# Patient Record
Sex: FEMALE | Race: WHITE | NOT HISPANIC OR LATINO | Employment: OTHER | URBAN - METROPOLITAN AREA
[De-identification: names, ages, dates, MRNs, and addresses within clinical notes are randomized per-mention and may not be internally consistent; named-entity substitution may affect disease eponyms.]

---

## 2017-01-31 ENCOUNTER — OFFICE VISIT (OUTPATIENT)
Dept: PODIATRY | Facility: CLINIC | Age: 77
End: 2017-01-31
Payer: MEDICARE

## 2017-01-31 VITALS
HEIGHT: 66 IN | SYSTOLIC BLOOD PRESSURE: 143 MMHG | HEART RATE: 66 BPM | WEIGHT: 178 LBS | DIASTOLIC BLOOD PRESSURE: 80 MMHG | BODY MASS INDEX: 28.61 KG/M2

## 2017-01-31 DIAGNOSIS — E11.49 TYPE II DIABETES MELLITUS WITH NEUROLOGICAL MANIFESTATIONS: Primary | ICD-10-CM

## 2017-01-31 DIAGNOSIS — L60.0 ONYCHOCRYPTOSIS: ICD-10-CM

## 2017-01-31 DIAGNOSIS — B35.1 ONYCHOMYCOSIS: ICD-10-CM

## 2017-01-31 PROCEDURE — 11721 DEBRIDE NAIL 6 OR MORE: CPT | Mod: S$PBB,Q9,, | Performed by: PODIATRIST

## 2017-01-31 PROCEDURE — 99999 PR PBB SHADOW E&M-EST. PATIENT-LVL II: CPT | Mod: PBBFAC,,, | Performed by: PODIATRIST

## 2017-01-31 PROCEDURE — 99212 OFFICE O/P EST SF 10 MIN: CPT | Mod: PBBFAC,PO,25 | Performed by: PODIATRIST

## 2017-01-31 PROCEDURE — 99212 OFFICE O/P EST SF 10 MIN: CPT | Mod: 25,S$PBB,, | Performed by: PODIATRIST

## 2017-01-31 PROCEDURE — 11721 DEBRIDE NAIL 6 OR MORE: CPT | Mod: PBBFAC,PO | Performed by: PODIATRIST

## 2017-01-31 NOTE — MR AVS SNAPSHOT
[facility]   [place]  [place] [address]  Phone: [phone]                  Ning Rodriguez   2017 8:00 AM   Office Visit    Description:  Female : 1940   Provider:  Fabrizio Parsons DPM   Department:  [facility]           Reason for Visit     Diabetes Mellitus     Follow-up           Diagnoses this Visit        Comments    Type II diabetes mellitus with neurological manifestations    -  Primary     Onychomycosis         Onychocryptosis                To Do List           Future Appointments        Provider Department Dept Phone    2017 8:00 AM [facility] [phone]      Goals (5 Years of Data)     None      Follow-Up and Disposition     Return in about 9 weeks (around 2017).    Follow-up and Disposition History      [facility]     [facility] Care [facility] -  Assistance  Registered nurses in the [facility] provide clinical advisement, health education, appointment booking, and other advisory services.  Call for this free service at [phone].             Medications                Verify that the below list of medications is an accurate representation of the medications you are currently taking.  If none reported, the list may be blank. If incorrect, please contact your healthcare provider. Carry this list with you in case of emergency.           Current Medications     aspirin (ECOTRIN) 81 MG EC tablet Take 81 mg by mouth once daily.    celecoxib (CELEBREX) 100 MG capsule Take 100 mg by mouth as needed for Pain.    cetirizine (ZYRTEC) 10 MG tablet Take 5 mg by mouth once daily.    FLUZONE HIGH-DOSE -, PF, 180 mcg/0.5 mL Syrg TO BE ADMINISTERED BY PHARMACIST FOR IMMUNIZATION    glipiZIDE (GLUCOTROL) 5 MG TR24 Take 5 mg by mouth once daily.           Clinical Reference Information           Vital Signs - Last Recorded  Most recent update: 2017  8:07 AM by Yoshi Kidd MA  "   BP Pulse Ht Wt BMI    (!) 143/80 66 5' 6" (1.676 m) 80.7 kg (178 lb) 28.73 kg/m2      Blood Pressure          Most Recent Value    BP  (!)  143/80      Allergies as of 1/31/2017     No Known Allergies      Immunizations Administered on Date of Encounter - 1/31/2017     None      [facility] Sign-Up     Activating your [facility] account is as easy as 1-2-3!     1) Visit [email or web address], select Sign Up Now, enter this activation code and your date of birth, then select Next.  [email or web address]  Expires: 3/17/2017  8:47 AM      2) Create a username and password to use when you visit [facility] in the future and select a security question in case you lose your password and select Next.    3) Enter your e-mail address and click Sign Up!    Additional Information  If you have questions, please e-mail [email or web address] or call [phone] to talk to our [facility] staff. Remember, [facility] is NOT to be used for urgent needs. For medical emergencies, dial 911.         "

## 2017-01-31 NOTE — PROGRESS NOTES
"Subjective:      Patient ID: Ning Rodriguez is a 76 y.o. female.    Chief Complaint: Diabetes Mellitus and Follow-up (3 month)    Ning is a 76 y.o. female who presents to the clinic for evaluation and treatment of diabetic feet. Ning has a past medical history of Allergy; Arthritis; and Diabetes mellitus, type 2. Patient relates no major problem with feet. High risk Diabetic foot care secondary to history of peripheral neuropathy. No new complaints.    PCP: Pam Soliman MD    Date Last Seen by PCP:     Current shoe gear: Casual shoes    No results found for: HGBA1C    Vitals:    01/31/17 0805   BP: (!) 143/80   Pulse: 66   Weight: 80.7 kg (178 lb)   Height: 5' 6" (1.676 m)   PainSc: 0-No pain      Past Medical History   Diagnosis Date    Allergy     Arthritis     Diabetes mellitus, type 2        Past Surgical History   Procedure Laterality Date    Hemorrhoid surgery         No family history on file.    Social History     Social History    Marital status:      Spouse name: N/A    Number of children: N/A    Years of education: N/A     Social History Main Topics    Smoking status: Former Smoker    Smokeless tobacco: None    Alcohol use No    Drug use: No    Sexual activity: Not Asked     Other Topics Concern    None     Social History Narrative       Current Outpatient Prescriptions   Medication Sig Dispense Refill    aspirin (ECOTRIN) 81 MG EC tablet Take 81 mg by mouth once daily.      celecoxib (CELEBREX) 100 MG capsule Take 100 mg by mouth as needed for Pain.      cetirizine (ZYRTEC) 10 MG tablet Take 5 mg by mouth once daily.      FLUZONE HIGH-DOSE 2016-17, PF, 180 mcg/0.5 mL Syrg TO BE ADMINISTERED BY PHARMACIST FOR IMMUNIZATION  0    glipiZIDE (GLUCOTROL) 5 MG TR24 Take 5 mg by mouth once daily.  5     No current facility-administered medications for this visit.        Review of patient's allergies indicates:  No Known Allergies      Review of Systems   Constitution: " Negative for chills, fever, weakness and malaise/fatigue.   Cardiovascular: Negative for chest pain, claudication and leg swelling.   Respiratory: Negative for cough and shortness of breath.    Skin: Positive for nail changes. Negative for color change, itching and rash.   Musculoskeletal: Positive for back pain. Negative for joint pain, muscle cramps and muscle weakness.   Gastrointestinal: Negative for nausea and vomiting.   Neurological: Positive for numbness. Negative for paresthesias.   Psychiatric/Behavioral: Negative for altered mental status.           Objective:      Physical Exam   Constitutional: She is oriented to person, place, and time. No distress.   Cardiovascular: Intact distal pulses.    Pulses:       Dorsalis pedis pulses are 2+ on the right side, and 2+ on the left side.        Posterior tibial pulses are 2+ on the right side, and 2+ on the left side.   CFT< 3 secs all toes bilateral foot, skin temp warm bilateral foot, diminished digital hair growth bilateral foot, no lower extremity edema bilateral.       Musculoskeletal:        Right foot: There is decreased range of motion and deformity.        Left foot: There is decreased range of motion and deformity.   Moderate hallux valgus with hypertrophic bony prominence medial first metatarsal head right foot. Semi-reducible claw toe deformities toes 2-5 bilateral foot. Pes planus foot type bilateral. No pain with ROM or MMT bilateral foot.   Feet:   Right Foot:   Protective Sensation: 10 sites tested. 10 sites sensed.   Skin Integrity: Negative for ulcer, blister, skin breakdown, erythema, warmth, callus or dry skin.   Left Foot:   Protective Sensation: 10 sites tested. 10 sites sensed.   Skin Integrity: Negative for ulcer, blister, skin breakdown, erythema, warmth, callus or dry skin.   Neurological: She is alert and oriented to person, place, and time. She has normal strength. A sensory deficit is present.   Absent vibratory sensation right foot,  decreased to the left foot.   Skin: Skin is warm, dry and intact. No ecchymosis and no rash noted. She is not diaphoretic. No cyanosis or erythema. No pallor. Nails show no clubbing.   Nails 1-5 bilateral are elongated and thickened. Hallux nail bilateral is dystrophic. There is yellow discoloration of bilateral hallux toenails. Right hallux distal lateral nail border is incurvated with localized edema of nail fold, slight pain on palpation.    No open lesions or macerations bilateral lower extremity.     Mild blanchable erythema overlying bony prominence first met head right foot.               Assessment:       Encounter Diagnoses   Name Primary?    Type II diabetes mellitus with neurological manifestations Yes    Onychomycosis     Onychocryptosis          Plan:       Ning was seen today for diabetes mellitus and follow-up.    Diagnoses and all orders for this visit:    Type II diabetes mellitus with neurological manifestations    Onychomycosis    Onychocryptosis      I counseled the patient on her conditions, their implications and medical management.    Shoe inspection. Diabetic Foot Education. Patient reminded of the importance of good nutrition and blood sugar control to help prevent podiatric complications of diabetes. Patient instructed on proper foot hygeine. We discussed wearing proper shoe gear, daily foot inspections, never walking without protective shoe gear, never putting sharp instruments to feet, routine podiatric nail visits every 2-3 months.      With patient's verbal consent, nails were aggressively reduced and debrided x 10 to their soft tissue attachment mechanically and with electric , removing all offending nail and debris. Patient relates relief following the procedure. No anesthesia or hemostasis required. No blood loss.     Discussed completing matrixectomy of right hallux lateral nail border if keeps recurring.    RTC 9 weeks or prn.

## 2017-04-04 ENCOUNTER — OFFICE VISIT (OUTPATIENT)
Dept: PODIATRY | Facility: CLINIC | Age: 77
End: 2017-04-04
Payer: MEDICARE

## 2017-04-04 VITALS
BODY MASS INDEX: 28.79 KG/M2 | SYSTOLIC BLOOD PRESSURE: 155 MMHG | HEIGHT: 66 IN | DIASTOLIC BLOOD PRESSURE: 89 MMHG | WEIGHT: 179.13 LBS | HEART RATE: 67 BPM

## 2017-04-04 DIAGNOSIS — E11.49 TYPE II DIABETES MELLITUS WITH NEUROLOGICAL MANIFESTATIONS: Primary | ICD-10-CM

## 2017-04-04 DIAGNOSIS — B35.1 ONYCHOMYCOSIS: ICD-10-CM

## 2017-04-04 PROCEDURE — 11721 DEBRIDE NAIL 6 OR MORE: CPT | Mod: PBBFAC,PO | Performed by: PODIATRIST

## 2017-04-04 PROCEDURE — 99999 PR PBB SHADOW E&M-EST. PATIENT-LVL II: CPT | Mod: PBBFAC,,, | Performed by: PODIATRIST

## 2017-04-04 PROCEDURE — 99212 OFFICE O/P EST SF 10 MIN: CPT | Mod: PBBFAC,PO,25 | Performed by: PODIATRIST

## 2017-04-04 PROCEDURE — 99499 UNLISTED E&M SERVICE: CPT | Mod: S$PBB,,, | Performed by: PODIATRIST

## 2017-04-04 PROCEDURE — 11721 DEBRIDE NAIL 6 OR MORE: CPT | Mod: S$PBB,Q9,, | Performed by: PODIATRIST

## 2017-04-04 NOTE — MR AVS SNAPSHOT
[facility]   [place]  [place] [address]  Phone: [phone]                  Ning Rodriguez   2017 8:00 AM   Office Visit    Description:  Female : 1940   Provider:  Fabrizio Parsons DPM   Department:  [facility]           Reason for Visit     Follow-up     Diabetes Mellitus           Diagnoses this Visit        Comments    Type II diabetes mellitus with neurological manifestations    -  Primary     Onychomycosis                To Do List           Future Appointments        Provider Department Dept Phone    2017 8:00 AM [facility] [phone]      Goals (5 Years of Data)     None      Follow-Up and Disposition     Return in about 9 weeks (around 2017).      [facility]     [facility] On Call Nurse Care Line -  Assistance  Unless otherwise directed by your provider, please contact [facility], our nurse care line that is available for  assistance.     Registered nurses in the [facility] On Call Center provide: appointment scheduling, clinical advisement, health education, and other advisory services.  Call: [phone] (toll free)               Medications                Verify that the below list of medications is an accurate representation of the medications you are currently taking.  If none reported, the list may be blank. If incorrect, please contact your healthcare provider. Carry this list with you in case of emergency.           Current Medications     aspirin (ECOTRIN) 81 MG EC tablet Take 81 mg by mouth once daily.    celecoxib (CELEBREX) 100 MG capsule Take 100 mg by mouth as needed for Pain.    cetirizine (ZYRTEC) 10 MG tablet Take 5 mg by mouth once daily.    FLUZONE HIGH-DOSE , PF, 180 mcg/0.5 mL Syrg TO BE ADMINISTERED BY PHARMACIST FOR IMMUNIZATION    glipiZIDE (GLUCOTROL) 5 MG TR24 Take 5 mg by mouth once daily.           Clinical Reference Information           Your Vitals Were     BP  "Pulse Height Weight BMI    155/89 67 5' 6" (1.676 m) 81.3 kg (179 lb 2 oz) 28.91 kg/m2      Blood Pressure          Most Recent Value    BP  (!)  155/89      Allergies as of 4/4/2017     No Known Allergies      Immunizations Administered on Date of Encounter - 4/4/2017     None      [facility] Sign-Up     Activating your [facility] account is as easy as 1-2-3!     1) Visit [email or web address], select Sign Up Now, enter this activation code and your date of birth, then select Next.  [email or web address]  Expires: 5/19/2017  8:40 AM      2) Create a username and password to use when you visit [facility] in the future and select a security question in case you lose your password and select Next.    3) Enter your e-mail address and click Sign Up!    Additional Information  If you have questions, please e-mail [email or web address] or call [phone] to talk to our [facility] staff. Remember, [facility] is NOT to be used for urgent needs. For medical emergencies, dial 911.         Language Assistance Services     ATTENTION: Language assistance services are available, free of charge. Please call [phone].      ATENCIÓN: Si habla español, tiene a durbin disposición servicios gratuitos de asistencia lingüística. Llame al [phone].     CHÚ Ý: N?u b?n nói Ti?ng Vi?t, có các d?ch v? h? tr? ngôn ng? mi?n phí dành cho b?n. G?i s? [phone].         [facility] complies with applicable Federal civil rights laws and does not discriminate on the basis of race, color, national origin, age, disability, or sex.        "

## 2017-04-05 NOTE — PROGRESS NOTES
"Subjective:      Patient ID: Ning Rodriguez is a 77 y.o. female.    Chief Complaint: Follow-up (3 month) and Diabetes Mellitus    Ning is a 77 y.o. female who presents to the clinic for evaluation and treatment of diabetic feet. Ning has a past medical history of Allergy; Arthritis; and Diabetes mellitus, type 2. Patient relates no major problem with feet. High risk Diabetic foot care secondary to history of peripheral neuropathy. No new complaints.    PCP: Pam Soliman MD    Date Last Seen by PCP: 6 months ago pending appt next week    Current shoe gear: Casual shoes    No results found for: HGBA1C    Vitals:    04/04/17 0815   BP: (!) 155/89   Pulse: 67   Weight: 81.3 kg (179 lb 2 oz)   Height: 5' 6" (1.676 m)   PainSc: 0-No pain      Past Medical History:   Diagnosis Date    Allergy     Arthritis     Diabetes mellitus, type 2        Past Surgical History:   Procedure Laterality Date    HEMORRHOID SURGERY         No family history on file.    Social History     Social History    Marital status:      Spouse name: N/A    Number of children: N/A    Years of education: N/A     Social History Main Topics    Smoking status: Former Smoker    Smokeless tobacco: Not on file    Alcohol use No    Drug use: No    Sexual activity: Not on file     Other Topics Concern    Not on file     Social History Narrative       Current Outpatient Prescriptions   Medication Sig Dispense Refill    aspirin (ECOTRIN) 81 MG EC tablet Take 81 mg by mouth once daily.      celecoxib (CELEBREX) 100 MG capsule Take 100 mg by mouth as needed for Pain.      cetirizine (ZYRTEC) 10 MG tablet Take 5 mg by mouth once daily.      FLUZONE HIGH-DOSE 2016-17, PF, 180 mcg/0.5 mL Syrg TO BE ADMINISTERED BY PHARMACIST FOR IMMUNIZATION  0    glipiZIDE (GLUCOTROL) 5 MG TR24 Take 5 mg by mouth once daily.  5     No current facility-administered medications for this visit.        Review of patient's allergies indicates:  No " Known Allergies      Review of Systems   Constitution: Negative for chills, fever, weakness and malaise/fatigue.   Cardiovascular: Negative for chest pain, claudication and leg swelling.   Respiratory: Negative for cough and shortness of breath.    Skin: Positive for nail changes. Negative for color change, itching and rash.   Musculoskeletal: Positive for back pain. Negative for joint pain, muscle cramps and muscle weakness.   Gastrointestinal: Negative for nausea and vomiting.   Neurological: Positive for numbness. Negative for paresthesias.   Psychiatric/Behavioral: Negative for altered mental status.           Objective:      Physical Exam   Constitutional: She is oriented to person, place, and time. No distress.   Cardiovascular: Intact distal pulses.    Pulses:       Dorsalis pedis pulses are 2+ on the right side, and 2+ on the left side.        Posterior tibial pulses are 2+ on the right side, and 2+ on the left side.   CFT< 3 secs all toes bilateral foot, skin temp warm bilateral foot, diminished digital hair growth bilateral foot, no lower extremity edema bilateral.       Musculoskeletal:        Right foot: There is decreased range of motion and deformity.        Left foot: There is decreased range of motion and deformity.   Moderate hallux valgus with hypertrophic bony prominence medial first metatarsal head right foot. Semi-reducible claw toe deformities toes 2-5 bilateral foot. Pes planus foot type bilateral. No pain with ROM or MMT bilateral foot.   Feet:   Right Foot:   Protective Sensation: 10 sites tested. 10 sites sensed.   Skin Integrity: Negative for ulcer, blister, skin breakdown, erythema, warmth, callus or dry skin.   Left Foot:   Protective Sensation: 10 sites tested. 10 sites sensed.   Skin Integrity: Negative for ulcer, blister, skin breakdown, erythema, warmth, callus or dry skin.   Neurological: She is alert and oriented to person, place, and time. She has normal strength. A sensory  deficit is present.   Absent vibratory sensation right foot, decreased to the left foot.   Skin: Skin is warm, dry and intact. No ecchymosis and no rash noted. She is not diaphoretic. No cyanosis or erythema. No pallor. Nails show no clubbing.   Nails 1-5 bilateral are elongated and thickened. Hallux nail bilateral is dystrophic. There is yellow discoloration of bilateral hallux toenails. Right hallux distal lateral nail border is incurvated with localized edema of nail fold, slight pain on palpation.    No open lesions or macerations bilateral lower extremity.                  Assessment:       Encounter Diagnoses   Name Primary?    Type II diabetes mellitus with neurological manifestations Yes    Onychomycosis          Plan:       Ning was seen today for follow-up and diabetes mellitus.    Diagnoses and all orders for this visit:    Type II diabetes mellitus with neurological manifestations    Onychomycosis      I counseled the patient on her conditions, their implications and medical management.    Shoe inspection. Diabetic Foot Education. Patient reminded of the importance of good nutrition and blood sugar control to help prevent podiatric complications of diabetes. Patient instructed on proper foot hygeine. We discussed wearing proper shoe gear, daily foot inspections, never walking without protective shoe gear, never putting sharp instruments to feet, routine podiatric nail visits every 2-3 months.      With patient's verbal consent, nails were aggressively reduced and debrided x 10 to their soft tissue attachment mechanically and with electric , removing all offending nail and debris. Patient relates relief following the procedure. No anesthesia or hemostasis required. No blood loss.     Continues to refuse matrixectomy right hallux.     RTC 9 weeks or prn.

## 2017-06-06 ENCOUNTER — OFFICE VISIT (OUTPATIENT)
Dept: PODIATRY | Facility: CLINIC | Age: 77
End: 2017-06-06
Payer: MEDICARE

## 2017-06-06 VITALS
WEIGHT: 179 LBS | HEIGHT: 66 IN | DIASTOLIC BLOOD PRESSURE: 80 MMHG | SYSTOLIC BLOOD PRESSURE: 153 MMHG | BODY MASS INDEX: 28.77 KG/M2 | HEART RATE: 75 BPM

## 2017-06-06 DIAGNOSIS — B35.1 ONYCHOMYCOSIS: ICD-10-CM

## 2017-06-06 DIAGNOSIS — E11.42 TYPE 2 DIABETES MELLITUS WITH PERIPHERAL NEUROPATHY: Primary | ICD-10-CM

## 2017-06-06 PROCEDURE — 11721 DEBRIDE NAIL 6 OR MORE: CPT | Mod: PBBFAC,PO | Performed by: PODIATRIST

## 2017-06-06 PROCEDURE — 99213 OFFICE O/P EST LOW 20 MIN: CPT | Mod: PBBFAC,PO,25 | Performed by: PODIATRIST

## 2017-06-06 PROCEDURE — 99999 PR PBB SHADOW E&M-EST. PATIENT-LVL III: CPT | Mod: PBBFAC,,, | Performed by: PODIATRIST

## 2017-06-06 PROCEDURE — 11721 DEBRIDE NAIL 6 OR MORE: CPT | Mod: S$PBB,Q9,, | Performed by: PODIATRIST

## 2017-06-06 PROCEDURE — 99499 UNLISTED E&M SERVICE: CPT | Mod: S$PBB,,, | Performed by: PODIATRIST

## 2017-06-08 NOTE — PROGRESS NOTES
"Subjective:      Patient ID: Ning Rodriguez is a 77 y.o. female.    Chief Complaint: Diabetic Foot Exam    Ning is a 77 y.o. female who presents to the clinic for evaluation and treatment of diabetic feet. Ning has a past medical history of Allergy; Arthritis; and Diabetes mellitus, type 2. Patient relates no major problem with feet. High risk Diabetic foot care secondary to history of peripheral neuropathy. No new complaints.    PCP: Pam Soliman MD    Date Last Seen by PCP:     Current shoe gear: Casual shoes    No results found for: HGBA1C    Vitals:    06/06/17 0813   BP: (!) 153/80   Pulse: 75   Weight: 81.2 kg (179 lb)   Height: 5' 6" (1.676 m)   PainSc:   2   PainLoc: Foot      Past Medical History:   Diagnosis Date    Allergy     Arthritis     Diabetes mellitus, type 2        Past Surgical History:   Procedure Laterality Date    HEMORRHOID SURGERY         No family history on file.    Social History     Social History    Marital status:      Spouse name: N/A    Number of children: N/A    Years of education: N/A     Social History Main Topics    Smoking status: Former Smoker    Smokeless tobacco: None    Alcohol use No    Drug use: No    Sexual activity: Not Asked     Other Topics Concern    None     Social History Narrative    None       Current Outpatient Prescriptions   Medication Sig Dispense Refill    aspirin (ECOTRIN) 81 MG EC tablet Take 81 mg by mouth once daily.      celecoxib (CELEBREX) 100 MG capsule Take 100 mg by mouth as needed for Pain.      cetirizine (ZYRTEC) 10 MG tablet Take 5 mg by mouth once daily.      FLUZONE HIGH-DOSE 2016-17, PF, 180 mcg/0.5 mL Syrg TO BE ADMINISTERED BY PHARMACIST FOR IMMUNIZATION  0    glipiZIDE (GLUCOTROL) 5 MG TR24 Take 5 mg by mouth once daily.  5     No current facility-administered medications for this visit.        Review of patient's allergies indicates:  No Known Allergies      Review of Systems   Constitution: " Negative for chills, fever, weakness and malaise/fatigue.   Cardiovascular: Negative for chest pain, claudication and leg swelling.   Respiratory: Negative for cough and shortness of breath.    Skin: Positive for nail changes. Negative for color change, itching and rash.   Musculoskeletal: Positive for back pain. Negative for joint pain, muscle cramps and muscle weakness.   Gastrointestinal: Negative for nausea and vomiting.   Neurological: Positive for numbness. Negative for paresthesias.   Psychiatric/Behavioral: Negative for altered mental status.           Objective:      Physical Exam   Constitutional: She is oriented to person, place, and time. No distress.   Cardiovascular: Intact distal pulses.    Pulses:       Dorsalis pedis pulses are 2+ on the right side, and 2+ on the left side.        Posterior tibial pulses are 2+ on the right side, and 2+ on the left side.   CFT< 3 secs all toes bilateral foot, skin temp warm bilateral foot, diminished digital hair growth bilateral foot, no lower extremity edema bilateral.       Musculoskeletal:        Right foot: There is decreased range of motion and deformity.        Left foot: There is decreased range of motion and deformity.   Moderate hallux valgus with hypertrophic bony prominence medial first metatarsal head right foot. Semi-reducible claw toe deformities toes 2-5 bilateral foot. Pes planus foot type bilateral. No pain with ROM or MMT bilateral foot.   Feet:   Right Foot:   Protective Sensation: 10 sites tested. 10 sites sensed.   Skin Integrity: Negative for ulcer, blister, skin breakdown, erythema, warmth, callus or dry skin.   Left Foot:   Protective Sensation: 10 sites tested. 10 sites sensed.   Skin Integrity: Negative for ulcer, blister, skin breakdown, erythema, warmth, callus or dry skin.   Neurological: She is alert and oriented to person, place, and time. She has normal strength. A sensory deficit is present.   Absent vibratory sensation right foot,  decreased to the left foot.   Skin: Skin is warm, dry and intact. Capillary refill takes less than 2 seconds. No ecchymosis and no rash noted. She is not diaphoretic. No cyanosis or erythema. No pallor. Nails show no clubbing.   Nails 1-5 bilateral are elongated and thickened. Hallux nail bilateral is dystrophic. There is yellow discoloration of bilateral hallux toenails. Right hallux distal lateral and medial nail borders are incurvated with localized edema of nail fold, slight pain on palpation.    No open lesions or macerations bilateral lower extremity.                  Assessment:       Encounter Diagnoses   Name Primary?    Type 2 diabetes mellitus with peripheral neuropathy Yes    Onychomycosis          Plan:       Ning was seen today for diabetic foot exam.    Diagnoses and all orders for this visit:    Type 2 diabetes mellitus with peripheral neuropathy    Onychomycosis      I counseled the patient on her conditions, their implications and medical management.    Shoe inspection. Diabetic Foot Education. Patient reminded of the importance of good nutrition and blood sugar control to help prevent podiatric complications of diabetes. Patient instructed on proper foot hygeine. We discussed wearing proper shoe gear, daily foot inspections, never walking without protective shoe gear, never putting sharp instruments to feet, routine podiatric nail visits every 2-3 months.      With patient's verbal consent, nails were aggressively reduced and debrided x 10 to their soft tissue attachment mechanically and with electric , removing all offending nail and debris. Patient relates relief following the procedure. No anesthesia or hemostasis required. No blood loss.     Once again discussed P&A matrixectomy if needed in OR setting due to her anxiety.     RTC 9 weeks or prn.

## 2017-09-07 ENCOUNTER — CLINICAL SUPPORT (OUTPATIENT)
Dept: OTOLARYNGOLOGY | Facility: CLINIC | Age: 77
End: 2017-09-07
Payer: MEDICARE

## 2017-09-07 ENCOUNTER — OFFICE VISIT (OUTPATIENT)
Dept: OTOLARYNGOLOGY | Facility: CLINIC | Age: 77
End: 2017-09-07
Payer: MEDICARE

## 2017-09-07 VITALS
WEIGHT: 174.19 LBS | DIASTOLIC BLOOD PRESSURE: 91 MMHG | HEIGHT: 66 IN | TEMPERATURE: 98 F | BODY MASS INDEX: 27.99 KG/M2 | SYSTOLIC BLOOD PRESSURE: 144 MMHG | HEART RATE: 75 BPM

## 2017-09-07 DIAGNOSIS — H90.3 SENSORINEURAL HEARING LOSS, BILATERAL: Primary | ICD-10-CM

## 2017-09-07 DIAGNOSIS — H90.3 SENSORINEURAL HEARING LOSS (SNHL), BILATERAL: ICD-10-CM

## 2017-09-07 DIAGNOSIS — J30.89 CHRONIC NONSEASONAL ALLERGIC RHINITIS DUE TO OTHER ALLERGEN: Primary | ICD-10-CM

## 2017-09-07 DIAGNOSIS — H93.13 TINNITUS, BILATERAL: ICD-10-CM

## 2017-09-07 DIAGNOSIS — H69.93 ETD (EUSTACHIAN TUBE DYSFUNCTION), BILATERAL: ICD-10-CM

## 2017-09-07 PROCEDURE — 99999 PR PBB SHADOW E&M-EST. PATIENT-LVL III: CPT | Mod: PBBFAC,,, | Performed by: OTOLARYNGOLOGY

## 2017-09-07 PROCEDURE — 92557 COMPREHENSIVE HEARING TEST: CPT | Mod: PBBFAC | Performed by: AUDIOLOGIST-HEARING AID FITTER

## 2017-09-07 PROCEDURE — 99213 OFFICE O/P EST LOW 20 MIN: CPT | Mod: PBBFAC,25 | Performed by: OTOLARYNGOLOGY

## 2017-09-07 PROCEDURE — 99203 OFFICE O/P NEW LOW 30 MIN: CPT | Mod: 25,S$PBB,, | Performed by: OTOLARYNGOLOGY

## 2017-09-07 PROCEDURE — 96372 THER/PROPH/DIAG INJ SC/IM: CPT | Mod: PBBFAC

## 2017-09-07 PROCEDURE — 1159F MED LIST DOCD IN RCRD: CPT | Mod: ,,, | Performed by: OTOLARYNGOLOGY

## 2017-09-07 PROCEDURE — 92567 TYMPANOMETRY: CPT | Mod: PBBFAC | Performed by: AUDIOLOGIST-HEARING AID FITTER

## 2017-09-07 PROCEDURE — 1126F AMNT PAIN NOTED NONE PRSNT: CPT | Mod: ,,, | Performed by: OTOLARYNGOLOGY

## 2017-09-07 RX ORDER — FLUTICASONE PROPIONATE 50 MCG
2 SPRAY, SUSPENSION (ML) NASAL DAILY
COMMUNITY
End: 2021-11-17

## 2017-09-07 RX ORDER — TRIAMCINOLONE ACETONIDE 55 UG/1
2 SPRAY, METERED NASAL DAILY
Qty: 10.8 G | Refills: 0
Start: 2017-09-07

## 2017-09-07 RX ORDER — LEVOCETIRIZINE DIHYDROCHLORIDE 5 MG/1
5 TABLET, FILM COATED ORAL NIGHTLY
Qty: 30 TABLET | Refills: 11 | Status: SHIPPED | OUTPATIENT
Start: 2017-09-07 | End: 2021-11-17

## 2017-09-07 RX ORDER — METHYLPREDNISOLONE ACETATE 40 MG/ML
40 INJECTION, SUSPENSION INTRA-ARTICULAR; INTRALESIONAL; INTRAMUSCULAR; SOFT TISSUE
Status: COMPLETED | OUTPATIENT
Start: 2017-09-07 | End: 2017-09-07

## 2017-09-07 RX ADMIN — METHYLPREDNISOLONE ACETATE 40 MG: 40 INJECTION, SUSPENSION INTRA-ARTICULAR; INTRALESIONAL; INTRAMUSCULAR; SOFT TISSUE at 04:09

## 2017-09-07 NOTE — PROGRESS NOTES
Mnady Krishnan, F-AAA  [facility] [address]  [address]  [place] [address]      Patient: Ning Rodriguez   MRN: [id number]  : 1940  HAMM: 2017       AUDIOLOGICAL EVALUATION    CASE HISTORY:    Ning Rodriguez, 77 y.o., was seen on the above date for an audiological evaluation. Patient reported hearing loss and tinnitus in both ears. She also reported about 2 to 3 episodes of dizziness annually for the past few years. No further history significant to hearing loss was reported.    TEST RESULTS:    Otoscopy was unremarkable for both ears. Imittance testing revealed normal middle ear compliance (Type A tympanograms) in both ears. Speech reception thresholds were obtained at 25 dB HL and 30 dB HL, in the right and left ears, respectively, which was consistent with pure tone results. A word recognition score of 96% was obtained in the right ear using a presentation level of 65 dBHL. A left ear word recognition score of 72% correct was obtained using a presentation level of 70 dBHL.     IMPRESSION:   Audiological testing indicated that Ning Rodriguez has a mild sloping to moderate sensorineural hearing loss in both ears.    RECOMMENDATIONS:   It is recommended that she:  Continue to receive audiological monitoring annually.  Receive binaural hearing aids to improve speech understanding.  Follow up medically with a physician to assess her dizziness.    If you should have any questions or concerns regarding the above information, please do not hesitate to contact me at [phone].      _______________________________  Emile Krishnan, [facility]  Clinical Audiologist    enclosure: audiogram

## 2018-10-04 RX ORDER — GLIPIZIDE 5 MG/1
TABLET, FILM COATED, EXTENDED RELEASE ORAL DAILY
Qty: 90 TABLET | Refills: 0 | Status: CANCELLED | OUTPATIENT
Start: 2018-10-04 | End: 2019-10-04

## 2018-10-15 RX ORDER — GLIPIZIDE 5 MG/1
TABLET, FILM COATED, EXTENDED RELEASE ORAL DAILY
Qty: 90 TABLET | Refills: 0 | Status: CANCELLED | OUTPATIENT
Start: 2018-10-15 | End: 2019-10-15

## 2018-10-17 RX ORDER — GLIPIZIDE 5 MG/1
TABLET, FILM COATED, EXTENDED RELEASE ORAL DAILY
Qty: 90 TABLET | Refills: 0 | Status: CANCELLED | OUTPATIENT
Start: 2018-10-17 | End: 2019-10-17

## 2019-01-31 ENCOUNTER — HOSPITAL ENCOUNTER (OUTPATIENT)
Dept: RADIOLOGY | Facility: HOSPITAL | Age: 79
Discharge: HOME | End: 2019-01-31
Attending: INTERNAL MEDICINE
Payer: MEDICARE

## 2019-01-31 DIAGNOSIS — J40 BRONCHITIS: ICD-10-CM

## 2019-01-31 DIAGNOSIS — J20.9 ACUTE BRONCHITIS, UNSPECIFIED ORGANISM: ICD-10-CM

## 2019-01-31 PROCEDURE — 71046 X-RAY EXAM CHEST 2 VIEWS: CPT | Mod: 26,,, | Performed by: RADIOLOGY

## 2019-01-31 PROCEDURE — 71046 XR CHEST PA AND LATERAL: ICD-10-PCS | Mod: 26,,, | Performed by: RADIOLOGY

## 2019-01-31 PROCEDURE — 71046 X-RAY EXAM CHEST 2 VIEWS: CPT | Mod: TC,FY

## 2019-02-01 ENCOUNTER — OFFICE VISIT (OUTPATIENT)
Dept: URGENT CARE | Facility: CLINIC | Age: 79
End: 2019-02-01
Payer: MEDICARE

## 2019-02-01 VITALS
DIASTOLIC BLOOD PRESSURE: 72 MMHG | RESPIRATION RATE: 18 BRPM | BODY MASS INDEX: 27.97 KG/M2 | TEMPERATURE: 98 F | OXYGEN SATURATION: 97 % | HEIGHT: 66 IN | HEART RATE: 68 BPM | SYSTOLIC BLOOD PRESSURE: 128 MMHG | WEIGHT: 174 LBS

## 2019-02-01 DIAGNOSIS — J40 BRONCHITIS: Primary | ICD-10-CM

## 2019-02-01 PROCEDURE — 99203 PR OFFICE/OUTPT VISIT, NEW, LEVL III, 30-44 MIN: ICD-10-PCS | Mod: S$GLB,,, | Performed by: PHYSICIAN ASSISTANT

## 2019-02-01 PROCEDURE — 99203 OFFICE O/P NEW LOW 30 MIN: CPT | Mod: S$GLB,,, | Performed by: PHYSICIAN ASSISTANT

## 2019-02-01 RX ORDER — ALBUTEROL SULFATE 90 UG/1
2 AEROSOL, METERED RESPIRATORY (INHALATION) EVERY 6 HOURS PRN
Qty: 18 G | Refills: 0 | Status: SHIPPED | OUTPATIENT
Start: 2019-02-01 | End: 2021-02-09 | Stop reason: CLARIF

## 2019-02-01 RX ORDER — BENZONATATE 100 MG/1
100 CAPSULE ORAL 3 TIMES DAILY PRN
Qty: 30 CAPSULE | Refills: 0 | Status: SHIPPED | OUTPATIENT
Start: 2019-02-01 | End: 2020-02-01

## 2019-02-01 RX ORDER — FLUTICASONE PROPIONATE 50 MCG
1 SPRAY, SUSPENSION (ML) NASAL DAILY
Qty: 16 G | Refills: 0 | Status: SHIPPED | OUTPATIENT
Start: 2019-02-01 | End: 2019-03-03

## 2019-02-01 RX ORDER — PROMETHAZINE HYDROCHLORIDE AND DEXTROMETHORPHAN HYDROBROMIDE 6.25; 15 MG/5ML; MG/5ML
5 SYRUP ORAL NIGHTLY PRN
Qty: 118 ML | Refills: 0 | Status: SHIPPED | OUTPATIENT
Start: 2019-02-01 | End: 2021-11-17

## 2019-02-01 NOTE — PROGRESS NOTES
"Subjective:       Patient ID: Ning Rodriguez is a 78 y.o. female.    Vitals:  height is 5' 6" (1.676 m) and weight is 78.9 kg (174 lb). Her temperature is 98.3 °F (36.8 °C). Her blood pressure is 183/99 (abnormal) and her pulse is 68. Her respiration is 18 and oxygen saturation is 97%.     Chief Complaint: BIANCA Barclay complains of congestion and a productive cough for the last 10 days. She was seen yesterday at [facility] and was prescribed a z-telma.      BIANCA    This is a new problem. The current episode started in the past 7 days. The problem has been gradually worsening. There has been no fever. Associated symptoms include congestion, coughing, headaches, rhinorrhea, sinus pain and sneezing. Pertinent negatives include no abdominal pain, chest pain, diarrhea, dysuria, ear pain, joint pain, joint swelling, nausea, neck pain, plugged ear sensation, rash, sore throat, swollen glands, vomiting or wheezing. She has tried increased fluids and decongestant (z telma) for the symptoms. The treatment provided no relief.       Constitution: Positive for fatigue and fever. Negative for chills and sweating.   HENT: Positive for congestion, sinus pain and sinus pressure. Negative for ear pain, sore throat and voice change.    Neck: Negative for neck pain and painful lymph nodes.   Cardiovascular: Negative for chest pain.   Eyes: Negative for eye redness.   Respiratory: Positive for cough and sputum production. Negative for chest tightness, bloody sputum, COPD, shortness of breath, stridor, wheezing and asthma.    Gastrointestinal: Positive for constipation. Negative for abdominal pain, nausea, vomiting and diarrhea.   Genitourinary: Negative for dysuria.   Musculoskeletal: Negative for muscle ache.   Skin: Negative for rash and erythema.   Allergic/Immunologic: Positive for sneezing. Negative for seasonal allergies and asthma.   Neurological: Positive for headaches.   Hematologic/Lymphatic: Negative for swollen lymph " nodes.       Objective:      Physical Exam   Constitutional: She is oriented to person, place, and time. She appears well-developed and well-nourished. She does not appear ill. No distress.   HENT:   Head: Normocephalic and atraumatic.   Right Ear: Hearing, tympanic membrane, external ear and ear canal normal.   Left Ear: Hearing, tympanic membrane, external ear and ear canal normal.   Nose: Mucosal edema present. Right sinus exhibits no maxillary sinus tenderness and no frontal sinus tenderness. Left sinus exhibits no maxillary sinus tenderness and no frontal sinus tenderness.   Mouth/Throat: Uvula is midline and oropharynx is clear and moist. No oropharyngeal exudate, posterior oropharyngeal edema or posterior oropharyngeal erythema.   Eyes: Conjunctivae, EOM and lids are normal. Right eye exhibits no discharge. Left eye exhibits no discharge.   Neck: Normal range of motion. Neck supple.   Cardiovascular: Normal rate, regular rhythm and normal heart sounds. Exam reveals no gallop and no friction rub.   No murmur heard.  Pulmonary/Chest: Effort normal and breath sounds normal. No stridor. No respiratory distress. She has no decreased breath sounds. She has no wheezes. She has no rhonchi. She has no rales.   Productive cough in clinic - clear lung sounds bilaterally.   Musculoskeletal: Normal range of motion.   Lymphadenopathy:        Head (right side): No submandibular and no tonsillar adenopathy present.        Head (left side): No submandibular and no tonsillar adenopathy present.   Neurological: She is alert and oriented to person, place, and time.   Skin: Skin is warm and dry. No rash noted. She is not diaphoretic. No erythema. No pallor.   Psychiatric: She has a normal mood and affect. Her behavior is normal.   Nursing note and vitals reviewed.      Assessment:       1. Bronchitis        Plan:       CXR yesterday unremarkable.     Bronchitis  -     albuterol (PROVENTIL/VENTOLIN HFA) 90 mcg/actuation inhaler;  Inhale 2 puffs into the lungs every 6 (six) hours as needed for Wheezing. Rescue  Dispense: 18 g; Refill: 0  -     fluticasone (FLONASE) 50 mcg/actuation nasal spray; 1 spray (50 mcg total) by Each Nare route once daily. for 10 days  Dispense: 9.9 mL; Refill: 0  -     benzonatate (TESSALON PERLES) 100 MG capsule; Take 1 capsule (100 mg total) by mouth 3 (three) times daily as needed for Cough.  Dispense: 30 capsule; Refill: 0  -     promethazine-dextromethorphan (PROMETHAZINE-DM) 6.25-15 mg/5 mL Syrp; Take 5 mLs by mouth nightly as needed.  Dispense: 118 mL; Refill: 0      Patient Instructions   -Use flonase as prescribed for nasal congestion.  -Use inhaler as needed for cough/wheezing.  -Take tessalon perles during the day and cough syrup at night as needed. Be aware the cough syrup may cause drowsiness.  -Continue antibiotic to completion.    Please follow up with your primary care provider within 2-5 days if your signs and symptoms have not resolved or worsen.     If your condition worsens or fails to improve we recommend that you receive another evaluation at the emergency room immediately or contact your primary medical clinic to discuss your concerns.   You must understand that you have received an Urgent Care treatment only and that you may be released before all of your medical problems are known or treated. You, the patient, will arrange for follow up care as instructed.         Bronchitis, Antibiotic Treatment (Adult)    Bronchitis is an infection of the air passages (bronchial tubes) in your lungs. It often occurs when you have a cold. This illness is contagious during the first few days and is spread through the air by coughing and sneezing, or by direct contact (touching the sick person and then touching your own eyes, nose, or mouth).  Symptoms of bronchitis include cough with mucus (phlegm) and low-grade fever. Bronchitis usually lasts 7 to 14 days. Mild cases can be treated with simple home remedies.  More severe infection is treated with an antibiotic.  Home care  Follow these guidelines when caring for yourself at home:  · If your symptoms are severe, rest at home for the first 2 to 3 days. When you go back to your usual activities, don't let yourself get too tired.  · Do not smoke. Also avoid being exposed to secondhand smoke.  · You may use over-the-counter medicines to control fever or pain, unless another medicine was prescribed. (Note: If you have chronic liver or kidney disease or have ever had a stomach ulcer or gastrointestinal bleeding, talk with your healthcare provider before using these medicines. Also talk to your provider if you are taking medicine to prevent blood clots.) Aspirin should never be given to anyone younger than 18 years of age who is ill with a viral infection or fever. It may cause severe liver or brain damage.  · Your appetite may be poor, so a light diet is fine. Avoid dehydration by drinking 6 to 8 glasses of fluids per day (such as water, soft drinks, sports drinks, juices, tea, or soup). Extra fluids will help loosen secretions in the nose and lungs.  · Over-the-counter cough, cold, and sore-throat medicines will not shorten the length of the illness, but they may be helpful to reduce symptoms. (Note: Do not use decongestants if you have high blood pressure.)  · Finish all antibiotic medicine. Do this even if you are feeling better after only a few days.  Follow-up care  Follow up with your healthcare provider, or as advised. If you had an X-ray or ECG (electrocardiogram), a specialist will review it. You will be notified of any new findings that may affect your care.  Note: If you are age 65 or older, or if you have a chronic lung disease or condition that affects your immune system, or you smoke, talk to your healthcare provider about having pneumococcal vaccinations and a yearly influenza vaccination (flu shot).  When to seek medical advice  Call your healthcare provider  right away if any of these occur:  · Fever of 100.4°F (38°C) or higher  · Coughing up increased amounts of colored sputum  · Weakness, drowsiness, headache, facial pain, ear pain, or a stiff neck  Call 911, or get immediate medical care  Contact emergency services right away if any of these occur.  · Coughing up blood  · Worsening weakness, drowsiness, headache, or stiff neck  · Trouble breathing, wheezing, or pain with breathing  Date Last Reviewed: 9/13/2015  © 1107-8289 [employer]. [address] [place] [address]. All rights reserved. This information is not intended as a substitute for professional medical care. Always follow your healthcare professional's instructions.

## 2019-02-01 NOTE — PATIENT INSTRUCTIONS
-Use flonase as prescribed for nasal congestion.  -Use inhaler as needed for cough/wheezing.  -Take tessalon perles during the day and cough syrup at night as needed. Be aware the cough syrup may cause drowsiness.  -Continue antibiotic to completion.    Please follow up with your primary care provider within 2-5 days if your signs and symptoms have not resolved or worsen.     If your condition worsens or fails to improve we recommend that you receive another evaluation at the emergency room immediately or contact your primary medical clinic to discuss your concerns.   You must understand that you have received an Urgent Care treatment only and that you may be released before all of your medical problems are known or treated. You, the patient, will arrange for follow up care as instructed.         Bronchitis, Antibiotic Treatment (Adult)    Bronchitis is an infection of the air passages (bronchial tubes) in your lungs. It often occurs when you have a cold. This illness is contagious during the first few days and is spread through the air by coughing and sneezing, or by direct contact (touching the sick person and then touching your own eyes, nose, or mouth).  Symptoms of bronchitis include cough with mucus (phlegm) and low-grade fever. Bronchitis usually lasts 7 to 14 days. Mild cases can be treated with simple home remedies. More severe infection is treated with an antibiotic.  Home care  Follow these guidelines when caring for yourself at home:  · If your symptoms are severe, rest at home for the first 2 to 3 days. When you go back to your usual activities, don't let yourself get too tired.  · Do not smoke. Also avoid being exposed to secondhand smoke.  · You may use over-the-counter medicines to control fever or pain, unless another medicine was prescribed. (Note: If you have chronic liver or kidney disease or have ever had a stomach ulcer or gastrointestinal bleeding, talk with your healthcare provider before  using these medicines. Also talk to your provider if you are taking medicine to prevent blood clots.) Aspirin should never be given to anyone younger than 18 years of age who is ill with a viral infection or fever. It may cause severe liver or brain damage.  · Your appetite may be poor, so a light diet is fine. Avoid dehydration by drinking 6 to 8 glasses of fluids per day (such as water, soft drinks, sports drinks, juices, tea, or soup). Extra fluids will help loosen secretions in the nose and lungs.  · Over-the-counter cough, cold, and sore-throat medicines will not shorten the length of the illness, but they may be helpful to reduce symptoms. (Note: Do not use decongestants if you have high blood pressure.)  · Finish all antibiotic medicine. Do this even if you are feeling better after only a few days.  Follow-up care  Follow up with your healthcare provider, or as advised. If you had an X-ray or ECG (electrocardiogram), a specialist will review it. You will be notified of any new findings that may affect your care.  Note: If you are age 65 or older, or if you have a chronic lung disease or condition that affects your immune system, or you smoke, talk to your healthcare provider about having pneumococcal vaccinations and a yearly influenza vaccination (flu shot).  When to seek medical advice  Call your healthcare provider right away if any of these occur:  · Fever of 100.4°F (38°C) or higher  · Coughing up increased amounts of colored sputum  · Weakness, drowsiness, headache, facial pain, ear pain, or a stiff neck  Call 911, or get immediate medical care  Contact emergency services right away if any of these occur.  · Coughing up blood  · Worsening weakness, drowsiness, headache, or stiff neck  · Trouble breathing, wheezing, or pain with breathing  Date Last Reviewed: 9/13/2015  © 6238-9699 [employer]. [address] [place] [address], [place] [address]. All rights reserved. This information is not intended  as a substitute for professional medical care. Always follow your healthcare professional's instructions.

## 2019-02-21 DIAGNOSIS — J40 BRONCHITIS: ICD-10-CM

## 2019-02-22 RX ORDER — ALBUTEROL SULFATE 90 UG/1
2 AEROSOL, METERED RESPIRATORY (INHALATION) EVERY 6 HOURS PRN
Qty: 18 G | Refills: 0 | OUTPATIENT
Start: 2019-02-22 | End: 2020-02-22

## 2019-04-30 RX ORDER — GLIPIZIDE 5 MG/1
TABLET, FILM COATED, EXTENDED RELEASE ORAL DAILY
Qty: 90 TABLET | Refills: 0 | Status: CANCELLED | OUTPATIENT
Start: 2019-04-30 | End: 2020-04-29

## 2019-07-03 ENCOUNTER — LAB VISIT (OUTPATIENT)
Dept: LAB | Facility: HOSPITAL | Age: 79
End: 2019-07-03
Attending: INTERNAL MEDICINE
Payer: MEDICARE

## 2019-07-03 DIAGNOSIS — N39.0 UTI (URINARY TRACT INFECTION), UNCOMPLICATED: ICD-10-CM

## 2019-07-03 LAB
BACTERIA #/AREA URNS HPF: ABNORMAL /HPF
BILIRUB UR QL STRIP: NEGATIVE
CLARITY UR: ABNORMAL
COLOR UR: YELLOW
GLUCOSE UR QL STRIP: ABNORMAL
HGB UR QL STRIP: ABNORMAL
KETONES UR QL STRIP: NEGATIVE
LEUKOCYTE ESTERASE UR QL STRIP: ABNORMAL
MICROSCOPIC COMMENT: ABNORMAL
NITRITE UR QL STRIP: NEGATIVE
PH UR STRIP: 6 [PH] (ref 5–8)
PROT UR QL STRIP: NEGATIVE
RBC #/AREA URNS HPF: 10 /HPF (ref 0–4)
SP GR UR STRIP: 1.01 (ref 1–1.03)
URN SPEC COLLECT METH UR: ABNORMAL
UROBILINOGEN UR STRIP-ACNC: NEGATIVE EU/DL
WBC #/AREA URNS HPF: 35 /HPF (ref 0–5)
WBC CLUMPS URNS QL MICRO: ABNORMAL

## 2019-07-03 PROCEDURE — 87086 URINE CULTURE/COLONY COUNT: CPT

## 2019-07-03 PROCEDURE — 87088 URINE BACTERIA CULTURE: CPT

## 2019-07-03 PROCEDURE — 87186 SC STD MICRODIL/AGAR DIL: CPT

## 2019-07-03 PROCEDURE — 81000 URINALYSIS NONAUTO W/SCOPE: CPT

## 2019-07-03 PROCEDURE — 87077 CULTURE AEROBIC IDENTIFY: CPT

## 2019-07-05 LAB — BACTERIA UR CULT: ABNORMAL

## 2019-10-30 RX ORDER — GLIPIZIDE 5 MG/1
5 TABLET, FILM COATED, EXTENDED RELEASE ORAL DAILY
Qty: 90 TABLET | Refills: 1 | Status: CANCELLED | OUTPATIENT
Start: 2019-10-30

## 2019-11-13 RX ORDER — GLIPIZIDE 5 MG/1
5 TABLET, FILM COATED, EXTENDED RELEASE ORAL DAILY
Qty: 90 TABLET | Refills: 1 | Status: CANCELLED | OUTPATIENT
Start: 2019-11-13

## 2020-09-30 ENCOUNTER — LAB VISIT (OUTPATIENT)
Dept: FAMILY MEDICINE | Facility: CLINIC | Age: 80
End: 2020-09-30
Attending: INTERNAL MEDICINE
Payer: MEDICARE

## 2020-09-30 DIAGNOSIS — Z03.818 ENCOUNTER FOR OBSERVATION FOR SUSPECTED EXPOSURE TO OTHER BIOLOGICAL AGENTS RULED OUT: ICD-10-CM

## 2020-09-30 PROCEDURE — U0003 INFECTIOUS AGENT DETECTION BY NUCLEIC ACID (DNA OR RNA); SEVERE ACUTE RESPIRATORY SYNDROME CORONAVIRUS 2 (SARS-COV-2) (CORONAVIRUS DISEASE [COVID-19]), AMPLIFIED PROBE TECHNIQUE, MAKING USE OF HIGH THROUGHPUT TECHNOLOGIES AS DESCRIBED BY CMS-2020-01-R: HCPCS

## 2020-10-01 LAB — SARS-COV-2 RNA RESP QL NAA+PROBE: NOT DETECTED

## 2020-11-24 ENCOUNTER — CLINICAL SUPPORT (OUTPATIENT)
Dept: URGENT CARE | Facility: CLINIC | Age: 80
End: 2020-11-24
Payer: MEDICARE

## 2020-11-24 DIAGNOSIS — Z20.822 EXPOSURE TO COVID-19 VIRUS: Primary | ICD-10-CM

## 2020-11-24 LAB
CTP QC/QA: YES
SARS-COV-2 RDRP RESP QL NAA+PROBE: NEGATIVE

## 2020-11-24 PROCEDURE — U0002: ICD-10-PCS | Mod: QW,S$GLB,, | Performed by: NURSE PRACTITIONER

## 2020-11-24 PROCEDURE — U0002 COVID-19 LAB TEST NON-CDC: HCPCS | Mod: QW,S$GLB,, | Performed by: NURSE PRACTITIONER

## 2020-11-25 NOTE — PROGRESS NOTES
Subjective:       Patient ID: Ning Rodriguez is a 80 y.o. female.    Vitals:  vitals were not taken for this visit.     Chief Complaint: COVID-19 Concerns    HPI  ROS    Objective:      Physical Exam      Assessment:       No diagnosis found.    Plan:         There are no diagnoses linked to this encounter.

## 2021-01-09 ENCOUNTER — IMMUNIZATION (OUTPATIENT)
Dept: INTERNAL MEDICINE | Facility: CLINIC | Age: 81
End: 2021-01-09
Payer: MEDICARE

## 2021-01-09 DIAGNOSIS — Z23 NEED FOR VACCINATION: ICD-10-CM

## 2021-01-09 PROCEDURE — 91300 COVID-19, MRNA, LNP-S, PF, 30 MCG/0.3 ML DOSE VACCINE: CPT | Mod: PBBFAC | Performed by: FAMILY MEDICINE

## 2021-01-29 ENCOUNTER — CLINICAL SUPPORT (OUTPATIENT)
Dept: URGENT CARE | Facility: CLINIC | Age: 81
End: 2021-01-29
Payer: MEDICARE

## 2021-01-29 DIAGNOSIS — R05.9 COUGH: Primary | ICD-10-CM

## 2021-01-29 DIAGNOSIS — J06.9 URI (UPPER RESPIRATORY INFECTION): ICD-10-CM

## 2021-01-29 DIAGNOSIS — R05.9 COUGH: ICD-10-CM

## 2021-01-29 LAB
CTP QC/QA: YES
SARS-COV-2 RDRP RESP QL NAA+PROBE: NEGATIVE

## 2021-01-29 PROCEDURE — U0002 COVID-19 LAB TEST NON-CDC: HCPCS | Mod: QW,CR,S$GLB, | Performed by: STUDENT IN AN ORGANIZED HEALTH CARE EDUCATION/TRAINING PROGRAM

## 2021-01-29 PROCEDURE — U0002: ICD-10-PCS | Mod: QW,CR,S$GLB, | Performed by: STUDENT IN AN ORGANIZED HEALTH CARE EDUCATION/TRAINING PROGRAM

## 2021-01-30 ENCOUNTER — IMMUNIZATION (OUTPATIENT)
Dept: INTERNAL MEDICINE | Facility: CLINIC | Age: 81
End: 2021-01-30
Payer: MEDICARE

## 2021-01-30 DIAGNOSIS — Z23 NEED FOR VACCINATION: Primary | ICD-10-CM

## 2021-01-30 PROCEDURE — 0002A COVID-19, MRNA, LNP-S, PF, 30 MCG/0.3 ML DOSE VACCINE: CPT | Mod: PBBFAC | Performed by: FAMILY MEDICINE

## 2021-01-30 PROCEDURE — 91300 COVID-19, MRNA, LNP-S, PF, 30 MCG/0.3 ML DOSE VACCINE: CPT | Mod: PBBFAC | Performed by: FAMILY MEDICINE

## 2021-02-06 ENCOUNTER — LAB VISIT (OUTPATIENT)
Dept: LAB | Facility: HOSPITAL | Age: 81
End: 2021-02-06
Attending: INTERNAL MEDICINE
Payer: MEDICARE

## 2021-02-06 DIAGNOSIS — N30.01 ACUTE CYSTITIS WITH HEMATURIA: Primary | ICD-10-CM

## 2021-02-06 DIAGNOSIS — N30.01 ACUTE CYSTITIS WITH HEMATURIA: ICD-10-CM

## 2021-02-06 LAB
BACTERIA #/AREA URNS AUTO: ABNORMAL /HPF
BILIRUB UR QL STRIP: NEGATIVE
CLARITY UR REFRACT.AUTO: ABNORMAL
COLOR UR AUTO: YELLOW
GLUCOSE UR QL STRIP: NEGATIVE
HGB UR QL STRIP: ABNORMAL
KETONES UR QL STRIP: NEGATIVE
LEUKOCYTE ESTERASE UR QL STRIP: ABNORMAL
MICROSCOPIC COMMENT: ABNORMAL
NITRITE UR QL STRIP: NEGATIVE
NON-SQ EPI CELLS #/AREA URNS AUTO: 1 /HPF
PH UR STRIP: 5 [PH] (ref 5–8)
PROT UR QL STRIP: NEGATIVE
RBC #/AREA URNS AUTO: >100 /HPF (ref 0–4)
SP GR UR STRIP: 1.01 (ref 1–1.03)
SQUAMOUS #/AREA URNS AUTO: 2 /HPF
URN SPEC COLLECT METH UR: ABNORMAL
WBC #/AREA URNS AUTO: 58 /HPF (ref 0–5)
WBC CLUMPS UR QL AUTO: ABNORMAL

## 2021-02-06 PROCEDURE — 87088 URINE BACTERIA CULTURE: CPT

## 2021-02-06 PROCEDURE — 87077 CULTURE AEROBIC IDENTIFY: CPT

## 2021-02-06 PROCEDURE — 87086 URINE CULTURE/COLONY COUNT: CPT

## 2021-02-06 PROCEDURE — 87186 SC STD MICRODIL/AGAR DIL: CPT

## 2021-02-06 PROCEDURE — 81001 URINALYSIS AUTO W/SCOPE: CPT

## 2021-02-09 LAB — BACTERIA UR CULT: ABNORMAL

## 2021-02-09 RX ORDER — AMOXICILLIN AND CLAVULANATE POTASSIUM 875; 125 MG/1; MG/1
1 TABLET, FILM COATED ORAL 2 TIMES DAILY
Qty: 14 TABLET | Refills: 0 | Status: SHIPPED | OUTPATIENT
Start: 2021-02-09 | End: 2021-02-16

## 2021-04-11 RX ORDER — ORAL SEMAGLUTIDE 3 MG/1
3 TABLET ORAL DAILY
Qty: 30 TABLET | Refills: 2 | Status: SHIPPED | OUTPATIENT
Start: 2021-04-11 | End: 2021-07-15 | Stop reason: SDUPTHER

## 2021-04-26 ENCOUNTER — TELEPHONE (OUTPATIENT)
Dept: OTOLARYNGOLOGY | Facility: CLINIC | Age: 81
End: 2021-04-26

## 2021-05-25 RX ORDER — LEVOTHYROXINE SODIUM 25 UG/1
25 TABLET ORAL
Qty: 30 TABLET | Refills: 2 | Status: SHIPPED | OUTPATIENT
Start: 2021-05-25 | End: 2021-08-13 | Stop reason: SDUPTHER

## 2021-07-15 RX ORDER — ORAL SEMAGLUTIDE 3 MG/1
3 TABLET ORAL DAILY
Qty: 30 TABLET | Refills: 6 | OUTPATIENT
Start: 2021-07-15 | End: 2022-02-04

## 2021-08-13 RX ORDER — LEVOTHYROXINE SODIUM 25 UG/1
25 TABLET ORAL
Qty: 30 TABLET | Refills: 2 | Status: SHIPPED | OUTPATIENT
Start: 2021-08-13 | End: 2021-11-09 | Stop reason: SDUPTHER

## 2021-08-19 DIAGNOSIS — Z12.11 SCREEN FOR COLON CANCER: Primary | ICD-10-CM

## 2021-10-07 ENCOUNTER — IMMUNIZATION (OUTPATIENT)
Dept: INTERNAL MEDICINE | Facility: CLINIC | Age: 81
End: 2021-10-07
Payer: MEDICARE

## 2021-10-07 DIAGNOSIS — Z23 NEED FOR VACCINATION: Primary | ICD-10-CM

## 2021-10-07 PROCEDURE — 91300 COVID-19, MRNA, LNP-S, PF, 30 MCG/0.3 ML DOSE VACCINE: CPT | Mod: PBBFAC,PO

## 2021-10-07 PROCEDURE — 0003A COVID-19, MRNA, LNP-S, PF, 30 MCG/0.3 ML DOSE VACCINE: CPT | Mod: PBBFAC,PO

## 2021-11-09 RX ORDER — LEVOTHYROXINE SODIUM 25 UG/1
25 TABLET ORAL
Qty: 30 TABLET | Refills: 2 | Status: SHIPPED | OUTPATIENT
Start: 2021-11-09 | End: 2022-02-11

## 2021-11-17 RX ORDER — LISINOPRIL 5 MG/1
5 TABLET ORAL DAILY
Qty: 90 TABLET | Refills: 3 | Status: SHIPPED | OUTPATIENT
Start: 2021-11-17 | End: 2022-01-09

## 2021-12-15 ENCOUNTER — HOSPITAL ENCOUNTER (OUTPATIENT)
Dept: RADIOLOGY | Facility: HOSPITAL | Age: 81
Discharge: HOME | End: 2021-12-15
Attending: INTERNAL MEDICINE
Payer: MEDICARE

## 2021-12-15 ENCOUNTER — CLINICAL SUPPORT (OUTPATIENT)
Dept: FAMILY MEDICINE | Facility: CLINIC | Age: 81
End: 2021-12-15
Payer: MEDICARE

## 2021-12-15 DIAGNOSIS — Z23 NEED FOR VACCINATION: Primary | ICD-10-CM

## 2021-12-15 DIAGNOSIS — I83.891 VARICOSE VEINS OF LEG WITH EDEMA, RIGHT: ICD-10-CM

## 2021-12-15 DIAGNOSIS — I82.403 ACUTE DEEP VEIN THROMBOSIS (DVT) OF BOTH LOWER EXTREMITIES, UNSPECIFIED VEIN: Primary | ICD-10-CM

## 2021-12-15 PROCEDURE — G0008 ADMIN INFLUENZA VIRUS VAC: HCPCS | Mod: PBBFAC,PO

## 2021-12-15 PROCEDURE — 93970 EXTREMITY STUDY: CPT | Mod: 26,,, | Performed by: RADIOLOGY

## 2021-12-15 PROCEDURE — 90694 VACC AIIV4 NO PRSRV 0.5ML IM: CPT | Mod: PBBFAC,PO

## 2021-12-15 PROCEDURE — 93970 EXTREMITY STUDY: CPT | Mod: TC

## 2021-12-15 PROCEDURE — 93970 US LOWER EXTREMITY VEINS BILATERAL: ICD-10-PCS | Mod: 26,,, | Performed by: RADIOLOGY

## 2022-01-09 RX ORDER — LISINOPRIL 20 MG/1
20 TABLET ORAL DAILY
Qty: 90 TABLET | Refills: 3 | Status: SHIPPED | OUTPATIENT
Start: 2022-01-09 | End: 2024-02-26 | Stop reason: SINTOL

## 2022-02-06 RX ORDER — LEVOTHYROXINE SODIUM 25 UG/1
25 TABLET ORAL
Qty: 30 TABLET | Refills: 2 | Status: CANCELLED | OUTPATIENT
Start: 2022-02-06 | End: 2022-05-07

## 2022-02-07 RX ORDER — LEVOTHYROXINE SODIUM 25 UG/1
25 TABLET ORAL
Qty: 30 TABLET | Refills: 2 | Status: CANCELLED | OUTPATIENT
Start: 2022-02-06 | End: 2022-05-07

## 2022-02-16 ENCOUNTER — LAB VISIT (OUTPATIENT)
Dept: LAB | Facility: HOSPITAL | Age: 82
End: 2022-02-16
Attending: INTERNAL MEDICINE
Payer: MEDICARE

## 2022-02-16 DIAGNOSIS — N39.0 UTI (URINARY TRACT INFECTION), UNCOMPLICATED: Primary | ICD-10-CM

## 2022-02-16 DIAGNOSIS — N39.0 UTI (URINARY TRACT INFECTION), UNCOMPLICATED: ICD-10-CM

## 2022-02-16 LAB
BACTERIA #/AREA URNS AUTO: ABNORMAL /HPF
BILIRUB UR QL STRIP: NEGATIVE
BILIRUB UR QL STRIP: NEGATIVE
CLARITY UR REFRACT.AUTO: ABNORMAL
CLARITY UR REFRACT.AUTO: ABNORMAL
COLOR UR AUTO: YELLOW
COLOR UR AUTO: YELLOW
GLUCOSE UR QL STRIP: NEGATIVE
GLUCOSE UR QL STRIP: NEGATIVE
HGB UR QL STRIP: ABNORMAL
HGB UR QL STRIP: ABNORMAL
KETONES UR QL STRIP: ABNORMAL
KETONES UR QL STRIP: ABNORMAL
LEUKOCYTE ESTERASE UR QL STRIP: ABNORMAL
LEUKOCYTE ESTERASE UR QL STRIP: ABNORMAL
MICROSCOPIC COMMENT: ABNORMAL
NITRITE UR QL STRIP: NEGATIVE
NITRITE UR QL STRIP: NEGATIVE
PH UR STRIP: 5 [PH] (ref 5–8)
PH UR STRIP: 5 [PH] (ref 5–8)
PROT UR QL STRIP: NEGATIVE
PROT UR QL STRIP: NEGATIVE
RBC #/AREA URNS AUTO: 39 /HPF (ref 0–4)
SP GR UR STRIP: 1.02 (ref 1–1.03)
SP GR UR STRIP: 1.02 (ref 1–1.03)
SQUAMOUS #/AREA URNS AUTO: 25 /HPF
URN SPEC COLLECT METH UR: ABNORMAL
URN SPEC COLLECT METH UR: ABNORMAL
WBC #/AREA URNS AUTO: >100 /HPF (ref 0–5)
WBC CLUMPS UR QL AUTO: ABNORMAL

## 2022-02-16 PROCEDURE — 87086 URINE CULTURE/COLONY COUNT: CPT | Performed by: INTERNAL MEDICINE

## 2022-02-16 PROCEDURE — 81001 URINALYSIS AUTO W/SCOPE: CPT | Performed by: INTERNAL MEDICINE

## 2022-02-16 PROCEDURE — 87186 SC STD MICRODIL/AGAR DIL: CPT | Mod: 59 | Performed by: INTERNAL MEDICINE

## 2022-02-16 PROCEDURE — 87077 CULTURE AEROBIC IDENTIFY: CPT | Mod: 59 | Performed by: INTERNAL MEDICINE

## 2022-02-16 PROCEDURE — 87088 URINE BACTERIA CULTURE: CPT | Performed by: INTERNAL MEDICINE

## 2022-02-16 RX ORDER — AMOXICILLIN AND CLAVULANATE POTASSIUM 250; 125 MG/1; MG/1
1 TABLET, FILM COATED ORAL EVERY 8 HOURS
Qty: 21 TABLET | Refills: 0 | Status: SHIPPED | OUTPATIENT
Start: 2022-02-16 | End: 2022-02-23

## 2022-02-18 LAB
BACTERIA UR CULT: ABNORMAL
BACTERIA UR CULT: ABNORMAL

## 2022-02-19 RX ORDER — ORAL SEMAGLUTIDE 3 MG/1
3 TABLET ORAL DAILY
Qty: 90 TABLET | Refills: 4 | Status: SHIPPED | OUTPATIENT
Start: 2022-02-19 | End: 2023-05-02 | Stop reason: SDUPTHER

## 2022-03-02 ENCOUNTER — DOCUMENTATION ONLY (OUTPATIENT)
Dept: NEPHROLOGY | Facility: HOSPITAL | Age: 82
End: 2022-03-02
Payer: MEDICARE

## 2022-03-09 ENCOUNTER — OFFICE VISIT (OUTPATIENT)
Dept: OPHTHALMOLOGY | Facility: CLINIC | Age: 82
End: 2022-03-09
Payer: MEDICARE

## 2022-03-09 DIAGNOSIS — Z01.818 PRE-OP TESTING: ICD-10-CM

## 2022-03-09 DIAGNOSIS — H25.13 NUCLEAR SCLEROSIS, BILATERAL: Primary | ICD-10-CM

## 2022-03-09 PROCEDURE — 92136 OPHTHALMIC BIOMETRY: CPT | Mod: PBBFAC | Performed by: OPHTHALMOLOGY

## 2022-03-09 PROCEDURE — 92136 IOL MASTER - OU - BOTH EYES: ICD-10-PCS | Mod: 26,S$PBB,RT, | Performed by: OPHTHALMOLOGY

## 2022-03-09 PROCEDURE — 99999 PR PBB SHADOW E&M-EST. PATIENT-LVL III: ICD-10-PCS | Mod: PBBFAC,,, | Performed by: OPHTHALMOLOGY

## 2022-03-09 PROCEDURE — 99213 OFFICE O/P EST LOW 20 MIN: CPT | Mod: PBBFAC | Performed by: OPHTHALMOLOGY

## 2022-03-09 PROCEDURE — 99204 PR OFFICE/OUTPT VISIT, NEW, LEVL IV, 45-59 MIN: ICD-10-PCS | Mod: S$PBB,,, | Performed by: OPHTHALMOLOGY

## 2022-03-09 PROCEDURE — 99204 OFFICE O/P NEW MOD 45 MIN: CPT | Mod: S$PBB,,, | Performed by: OPHTHALMOLOGY

## 2022-03-09 PROCEDURE — 99999 PR PBB SHADOW E&M-EST. PATIENT-LVL III: CPT | Mod: PBBFAC,,, | Performed by: OPHTHALMOLOGY

## 2022-03-09 RX ORDER — PREDNISOLONE ACETATE-GATIFLOXACIN-BROMFENAC .75; 5; 1 MG/ML; MG/ML; MG/ML
1 SUSPENSION/ DROPS OPHTHALMIC 3 TIMES DAILY
Qty: 5 ML | Refills: 3 | Status: SHIPPED | OUTPATIENT
Start: 2022-03-09 | End: 2022-09-19

## 2022-03-09 RX ORDER — SODIUM CHLORIDE 0.9 % (FLUSH) 0.9 %
10 SYRINGE (ML) INJECTION
Status: SHIPPED | OUTPATIENT
Start: 2022-03-09

## 2022-03-09 RX ORDER — TETRACAINE HYDROCHLORIDE 5 MG/ML
1 SOLUTION OPHTHALMIC
Status: CANCELLED | OUTPATIENT
Start: 2022-03-09

## 2022-03-09 RX ORDER — MOXIFLOXACIN 5 MG/ML
1 SOLUTION/ DROPS OPHTHALMIC
Status: CANCELLED | OUTPATIENT
Start: 2022-03-09

## 2022-03-09 RX ORDER — TROPICAMIDE 10 MG/ML
1 SOLUTION/ DROPS OPHTHALMIC
Status: CANCELLED | OUTPATIENT
Start: 2022-03-09

## 2022-03-09 RX ORDER — PHENYLEPHRINE HYDROCHLORIDE 25 MG/ML
1 SOLUTION/ DROPS OPHTHALMIC
Status: CANCELLED | OUTPATIENT
Start: 2022-03-09

## 2022-03-09 NOTE — PROGRESS NOTES
HPI     81 Y/O female presents to clinic for cataract eval    Pt states cant see as well. VA keeps getting worse. Wears glasses but they   dont seem to help. Needs more light to see     Last edited by Shaina Huitron on 3/9/2022  1:26 PM. (History)            Assessment /Plan     For exam results, see Encounter Report.    Nuclear sclerosis, bilateral      Visually Significant Cataract: Patient reports decreased vision consistent with the clinical amount of lenticular opacity, which reaches the level of visual significance and affects activities of daily living. Risks, benefits, and alternatives to cataract surgery were discussed and the consent reviewed. IOL options were discussed, including ATIOLs and the associated side effects and additional patient cost associated with them.   IOL Selections:   Right eye  IOL: TFNT00 VS 30 17.5      Left eye  IOL: TFNT00 VS 30 16.0     Pt wishes to have RIGHT eye done first.  The patient expresses a desire to reduce spectacle dependence. I reviewed various IOL and LASER refractive surgical options and we will attempt to minimize spectacle dependence by managing astigmatism and optimizing IOL selection. Femtosecond LASER assisted cataract surgery (FLACS) technology was explained to the patient with educational videos and discussion.  The patient voices understanding and wishes to implement this technology during the cataract procedure.  I explained the increased precision of the LASER versus manual techniques, especially as it relates to astigmatism reduction with arcuate incisions.  I emphasized that although our goal is to reduce the need for refractive correction after surgery, there may still be a need for spectacle correction to achieve optimal visual acuity, and that a reasonable range of functional vision should be the expectation.  No guarantees are made about post operative refraction or visual acuity, as the eye may heal in unpredictable ways, and the standard  risks, benefits, and alternatives to cataract surgery were explained.  The patient understands that the refractive portions of this cataract procedure are not covered by insurance, and that there is an out of pocket expense of $2250 per eye. I also explained that even though our pre-operative plan is to utilize advanced refractive technologies during surgery, that I may decide to eliminate part or all of this plan if surgical challenges or complications arise, or I feel that it is not in the patient's best interest. Consent forms and an ABN form were given to the patient to review.    ORA ONLY FOR CYL MEASUREMENT/TORIC DECISION

## 2022-04-13 ENCOUNTER — TELEPHONE (OUTPATIENT)
Dept: OPHTHALMOLOGY | Facility: CLINIC | Age: 82
End: 2022-04-13
Payer: MEDICARE

## 2022-04-13 NOTE — TELEPHONE ENCOUNTER
Left message for patient letting her know  will be out on May 30 and her second eye has to be push back to June 20

## 2022-04-18 ENCOUNTER — TELEPHONE (OUTPATIENT)
Dept: OPHTHALMOLOGY | Facility: CLINIC | Age: 82
End: 2022-04-18
Payer: MEDICARE

## 2022-04-18 DIAGNOSIS — H25.11 NUCLEAR SCLEROTIC CATARACT OF RIGHT EYE: Primary | ICD-10-CM

## 2022-04-28 ENCOUNTER — TELEPHONE (OUTPATIENT)
Dept: OPHTHALMOLOGY | Facility: CLINIC | Age: 82
End: 2022-04-28
Payer: MEDICARE

## 2022-04-28 DIAGNOSIS — H25.12 NUCLEAR SCLEROTIC CATARACT OF LEFT EYE: Primary | ICD-10-CM

## 2022-05-12 ENCOUNTER — TELEPHONE (OUTPATIENT)
Dept: OPHTHALMOLOGY | Facility: CLINIC | Age: 82
End: 2022-05-12
Payer: MEDICARE

## 2022-05-12 NOTE — TELEPHONE ENCOUNTER
Patient given arrival time of 9:00 am on Monday May 16 . Nothing to eat or drink after 9 pm.  Water, Gatorade after 9 pm until leaves home.  Start drops into the operative eye today. [address]

## 2022-05-13 ENCOUNTER — LAB VISIT (OUTPATIENT)
Dept: SURGERY | Facility: CLINIC | Age: 82
End: 2022-05-13
Payer: MEDICARE

## 2022-05-13 DIAGNOSIS — Z01.818 PRE-OP TESTING: ICD-10-CM

## 2022-05-13 LAB — SARS-COV-2 RNA RESP QL NAA+PROBE: NOT DETECTED

## 2022-05-13 PROCEDURE — U0003 INFECTIOUS AGENT DETECTION BY NUCLEIC ACID (DNA OR RNA); SEVERE ACUTE RESPIRATORY SYNDROME CORONAVIRUS 2 (SARS-COV-2) (CORONAVIRUS DISEASE [COVID-19]), AMPLIFIED PROBE TECHNIQUE, MAKING USE OF HIGH THROUGHPUT TECHNOLOGIES AS DESCRIBED BY CMS-2020-01-R: HCPCS | Performed by: OPHTHALMOLOGY

## 2022-05-13 PROCEDURE — U0005 INFEC AGEN DETEC AMPLI PROBE: HCPCS | Performed by: OPHTHALMOLOGY

## 2022-05-16 ENCOUNTER — HOSPITAL ENCOUNTER (OUTPATIENT)
Facility: OTHER | Age: 82
Discharge: HOME | End: 2022-05-16
Attending: OPHTHALMOLOGY | Admitting: OPHTHALMOLOGY
Payer: MEDICARE

## 2022-05-16 ENCOUNTER — ANESTHESIA (OUTPATIENT)
Dept: SURGERY | Facility: OTHER | Age: 82
End: 2022-05-16
Payer: MEDICARE

## 2022-05-16 ENCOUNTER — ANESTHESIA EVENT (OUTPATIENT)
Dept: SURGERY | Facility: OTHER | Age: 82
End: 2022-05-16
Payer: MEDICARE

## 2022-05-16 VITALS
HEART RATE: 62 BPM | DIASTOLIC BLOOD PRESSURE: 64 MMHG | WEIGHT: 175 LBS | OXYGEN SATURATION: 95 % | SYSTOLIC BLOOD PRESSURE: 122 MMHG | RESPIRATION RATE: 16 BRPM | TEMPERATURE: 98 F | HEIGHT: 66 IN | BODY MASS INDEX: 28.12 KG/M2

## 2022-05-16 DIAGNOSIS — H25.019 CORTICAL AGE-RELATED CATARACT, UNSPECIFIED LATERALITY: Primary | ICD-10-CM

## 2022-05-16 DIAGNOSIS — H25.13 NUCLEAR SCLEROSIS, BILATERAL: ICD-10-CM

## 2022-05-16 DIAGNOSIS — H25.11 NUCLEAR SCLEROTIC CATARACT OF RIGHT EYE: ICD-10-CM

## 2022-05-16 PROCEDURE — 82962 GLUCOSE BLOOD TEST: CPT | Performed by: OPHTHALMOLOGY

## 2022-05-16 PROCEDURE — 63600175 PHARM REV CODE 636 W HCPCS: Performed by: NURSE ANESTHETIST, CERTIFIED REGISTERED

## 2022-05-16 PROCEDURE — 71000015 HC POSTOP RECOV 1ST HR: Performed by: OPHTHALMOLOGY

## 2022-05-16 PROCEDURE — 36000706: Performed by: OPHTHALMOLOGY

## 2022-05-16 PROCEDURE — V2788 PRESBYOPIA-CORRECT FUNCTION: HCPCS | Performed by: OPHTHALMOLOGY

## 2022-05-16 PROCEDURE — 66999 UNLISTED PX ANT SEGMENT EYE: CPT | Mod: CSM,RT,, | Performed by: OPHTHALMOLOGY

## 2022-05-16 PROCEDURE — 25000003 PHARM REV CODE 250: Performed by: OPHTHALMOLOGY

## 2022-05-16 PROCEDURE — 66984 XCAPSL CTRC RMVL W/O ECP: CPT | Mod: RT,,, | Performed by: OPHTHALMOLOGY

## 2022-05-16 PROCEDURE — 37000009 HC ANESTHESIA EA ADD 15 MINS: Performed by: OPHTHALMOLOGY

## 2022-05-16 PROCEDURE — 36000707: Performed by: OPHTHALMOLOGY

## 2022-05-16 PROCEDURE — 37000008 HC ANESTHESIA 1ST 15 MINUTES: Performed by: OPHTHALMOLOGY

## 2022-05-16 PROCEDURE — 66984 PR REMOVAL, CATARACT, W/INSRT INTRAOC LENS, W/O ENDO CYCLO: ICD-10-PCS | Mod: RT,,, | Performed by: OPHTHALMOLOGY

## 2022-05-16 PROCEDURE — 66999 PR FEMTO MFIOL: ICD-10-PCS | Mod: CSM,RT,, | Performed by: OPHTHALMOLOGY

## 2022-05-16 RX ORDER — PROPARACAINE HYDROCHLORIDE 5 MG/ML
1 SOLUTION/ DROPS OPHTHALMIC
Status: DISCONTINUED | OUTPATIENT
Start: 2022-05-16 | End: 2022-05-16 | Stop reason: HOSPADM

## 2022-05-16 RX ORDER — LIDOCAINE HYDROCHLORIDE 40 MG/ML
INJECTION, SOLUTION RETROBULBAR
Status: DISCONTINUED | OUTPATIENT
Start: 2022-05-16 | End: 2022-05-16 | Stop reason: HOSPADM

## 2022-05-16 RX ORDER — PHENYLEPHRINE HYDROCHLORIDE 25 MG/ML
1 SOLUTION/ DROPS OPHTHALMIC
Status: COMPLETED | OUTPATIENT
Start: 2022-05-16 | End: 2022-05-16

## 2022-05-16 RX ORDER — MIDAZOLAM HYDROCHLORIDE 1 MG/ML
INJECTION INTRAMUSCULAR; INTRAVENOUS
Status: DISCONTINUED | OUTPATIENT
Start: 2022-05-16 | End: 2022-05-16

## 2022-05-16 RX ORDER — ACETAMINOPHEN 325 MG/1
650 TABLET ORAL EVERY 4 HOURS PRN
Status: DISCONTINUED | OUTPATIENT
Start: 2022-05-16 | End: 2022-05-16 | Stop reason: HOSPADM

## 2022-05-16 RX ORDER — MOXIFLOXACIN 5 MG/ML
1 SOLUTION/ DROPS OPHTHALMIC
Status: COMPLETED | OUTPATIENT
Start: 2022-05-16 | End: 2022-05-16

## 2022-05-16 RX ORDER — MOXIFLOXACIN 5 MG/ML
SOLUTION/ DROPS OPHTHALMIC
Status: DISCONTINUED | OUTPATIENT
Start: 2022-05-16 | End: 2022-05-16 | Stop reason: HOSPADM

## 2022-05-16 RX ORDER — TROPICAMIDE 10 MG/ML
1 SOLUTION/ DROPS OPHTHALMIC
Status: COMPLETED | OUTPATIENT
Start: 2022-05-16 | End: 2022-05-16

## 2022-05-16 RX ORDER — TETRACAINE HYDROCHLORIDE 5 MG/ML
1 SOLUTION OPHTHALMIC
Status: COMPLETED | OUTPATIENT
Start: 2022-05-16 | End: 2022-05-16

## 2022-05-16 RX ORDER — FENTANYL CITRATE 50 UG/ML
INJECTION, SOLUTION INTRAMUSCULAR; INTRAVENOUS
Status: DISCONTINUED | OUTPATIENT
Start: 2022-05-16 | End: 2022-05-16

## 2022-05-16 RX ADMIN — PHENYLEPHRINE HYDROCHLORIDE 1 DROP: 25 SOLUTION/ DROPS OPHTHALMIC at 09:05

## 2022-05-16 RX ADMIN — MOXIFLOXACIN 1 DROP: 5 SOLUTION/ DROPS OPHTHALMIC at 09:05

## 2022-05-16 RX ADMIN — FENTANYL CITRATE 50 MCG: 50 INJECTION, SOLUTION INTRAMUSCULAR; INTRAVENOUS at 11:05

## 2022-05-16 RX ADMIN — MOXIFLOXACIN 1 DROP: 5 SOLUTION/ DROPS OPHTHALMIC at 11:05

## 2022-05-16 RX ADMIN — TROPICAMIDE 1 DROP: 10 SOLUTION/ DROPS OPHTHALMIC at 09:05

## 2022-05-16 RX ADMIN — TETRACAINE HYDROCHLORIDE 1 DROP: 5 SOLUTION OPHTHALMIC at 09:05

## 2022-05-16 RX ADMIN — MIDAZOLAM HYDROCHLORIDE 1 MG: 1 INJECTION, SOLUTION INTRAMUSCULAR; INTRAVENOUS at 11:05

## 2022-05-16 RX ADMIN — MIDAZOLAM HYDROCHLORIDE 1 MG: 1 INJECTION, SOLUTION INTRAMUSCULAR; INTRAVENOUS at 10:05

## 2022-05-16 NOTE — ANESTHESIA POSTPROCEDURE EVALUATION
Anesthesia Post Evaluation    Patient: Ning Rodriguez    Procedure(s) Performed: Procedure(s) (LRB):  EXTRACTION, CATARACT, WITH IOL INSERTION (Right)    Final Anesthesia Type: MAC      Patient location during evaluation: [facility]  Patient participation: Yes- Able to Participate  Level of consciousness: awake and alert  Post-procedure vital signs: reviewed and stable  Pain management: adequate  Airway patency: patent    PONV status at discharge: No PONV  Anesthetic complications: no      Cardiovascular status: blood pressure returned to baseline  Respiratory status: unassisted, spontaneous ventilation and room air  Hydration status: euvolemic  Follow-up not needed.  Comments: Report called to RN on 4th floor.          Vitals Value Taken Time   /77 05/16/22 0946   Temp 36.5 °C (97.7 °F) 05/16/22 0946   Pulse 71 05/16/22 0946   Resp 18 05/16/22 0946   SpO2 98 % 05/16/22 0946         No case tracking events are documented in the log.      Pain/Maris Score: No data recorded

## 2022-05-16 NOTE — H&P
CC: Painless, progressive loss of vision  Present Illness: Nuclear sclerotic cataract  Allergies/Current Meds: see meds  Mental Status: A&O x3  Pertinent Medical History: n/a     Physical Exam  General: NAD  HEENT: Eye white/quiet  Lungs: Adequate respirations  Heart: + pulses  Abdomen: soft  Rectal/GI/: deferred     Impression: Cataract  Plan: Phacoemulsification with lens implant    
burns

## 2022-05-16 NOTE — DISCHARGE SUMMARY
Outcome: Successful outpatient ophthalmic surgical procedure  Preprinted Instructions given to patient.  Regular diet.  Activity: No restrictions  Meds: see Med Rec  Condition: stable  Follow up: 1 day with Dr Zarate  Disposition: Home  Diagnosis: s/p eye surgery

## 2022-05-16 NOTE — ANESTHESIA PREPROCEDURE EVALUATION
05/16/2022  Ning Rodriguez is a 82 y.o., female.      Pre-op Assessment    I have reviewed the Patient Summary Reports.     I have reviewed the Nursing Notes. I have reviewed the NPO Status.   I have reviewed the Medications.     Review of Systems  Anesthesia Hx:  No problems with previous Anesthesia    Social:  Non-Smoker    Cardiovascular:   Hypertension, well controlled    Pulmonary:  Pulmonary Normal    Hepatic/GI:  Hepatic/GI Normal    Endocrine:   Diabetes, well controlled        Physical Exam  General: Well nourished    Airway:  Mallampati: II   Mouth Opening: Normal  TM Distance: Normal  Tongue: Normal  Neck ROM: Normal ROM    Dental:  Intact        Anesthesia Plan  Type of Anesthesia, risks & benefits discussed:    Anesthesia Type: MAC  Intra-op Monitoring Plan: Standard ASA Monitors  Post Op Pain Control Plan: multimodal analgesia  Induction:  IV  Informed Consent: Informed consent signed with the Patient and all parties understand the risks and agree with anesthesia plan.  All questions answered.   ASA Score: 3    Ready For Surgery From Anesthesia Perspective.     .

## 2022-05-16 NOTE — OP NOTE
SURGEON:  Tami Zarate M.D.    PREOPERATIVE DIAGNOSIS:    Nuclear Sclerotic Cataract Right Eye    POSTOPERATIVE DIAGNOSIS:    Nuclear Sclerotic Cataract Right Eye    PROCEDURES:    Phacoemulsification with  intraocular lens, Right eye (34028)  With ORA  LASER assist    DATE OF SURGERY: 05/16/2022    IMPLANT: [id number] 17.5    ANESTHESIA:  MAC with topical Lidocaine    COMPLICATIONS:  None    ESTIMATED BLOOD LOSS: None    SPECIMENS: None    INDICATIONS:    The patient has a history of painless progressive visual loss and difficulty with activities of daily living, which specifically include difficult driving at night due to glare and difficulty reading small print, secondary to cataract formation.  After a thorough discussion of the risks, benefits, and alternatives to cataract surgery, including, but not limited to, the rare risks of infection, retinal detachment, hemorrhage, need for additional surgery, loss of vision, and even loss of the eye, the patient voices understanding and desires to proceed.    DESCRIPTION OF PROCEDURE:      The patients IOL calculations were reviewed, and the lens selection confirmed.   After verification and marking of the proper eye in the preop holding area, the patient was brought to the operating room in supine position where the eye was prepped and draped in standard sterile fashion with 5% Betadine and a lid speculum placed in the eye.   Topical 4% Lidocaine was used in addition to the preoperative anesthesia and the procedure was begun by the creation of a paracentesis incision through which viscoelastic was used to fill the anterior chamber.  Next, a keratome blade was used to create a triplanar temporal clear corneal incision and a cystotome and Utrata forceps used to fashion a continuous curvilinear capsulorrhexis.  Hydrodissection was carried out using the Freitas hydrodissection cannula and the nucleus was found to be mobile.  Phacoemulsification of the nucleus was carried out  using a quick chop technique, and all remaining epinuclear and cortical material was removed.  The eye was then reformed with Viscoelastic and ORA was used to verify the proper IOL power. The intraocular lens was then implanted into the capsular bag.  All remaining viscoelastics were removed from the eye and at the end of the case the pupil was round, the lens was well-centered within the capsular bag and all wounds were found to be water tight.  Drops of Vigamox and Pred Forte were instilled and a shield was placed over the eye. The patient will follow up with Dr. Zarate in the morning.

## 2022-05-17 ENCOUNTER — OFFICE VISIT (OUTPATIENT)
Dept: OPHTHALMOLOGY | Facility: CLINIC | Age: 82
End: 2022-05-17
Attending: OPHTHALMOLOGY
Payer: MEDICARE

## 2022-05-17 DIAGNOSIS — H25.13 NUCLEAR SCLEROTIC CATARACT, BILATERAL: ICD-10-CM

## 2022-05-17 DIAGNOSIS — Z98.890 POST-OPERATIVE STATE: Primary | ICD-10-CM

## 2022-05-17 LAB — POCT GLUCOSE: 110 MG/DL (ref 70–110)

## 2022-05-17 PROCEDURE — 99999 PR PBB SHADOW E&M-EST. PATIENT-LVL III: ICD-10-PCS | Mod: PBBFAC,,, | Performed by: OPHTHALMOLOGY

## 2022-05-17 PROCEDURE — 99024 PR POST-OP FOLLOW-UP VISIT: ICD-10-PCS | Mod: POP,,, | Performed by: OPHTHALMOLOGY

## 2022-05-17 PROCEDURE — 99024 POSTOP FOLLOW-UP VISIT: CPT | Mod: POP,,, | Performed by: OPHTHALMOLOGY

## 2022-05-17 PROCEDURE — 99999 PR PBB SHADOW E&M-EST. PATIENT-LVL III: CPT | Mod: PBBFAC,,, | Performed by: OPHTHALMOLOGY

## 2022-05-17 PROCEDURE — 99213 OFFICE O/P EST LOW 20 MIN: CPT | Mod: PBBFAC | Performed by: OPHTHALMOLOGY

## 2022-05-17 NOTE — PROGRESS NOTES
HPI     Right Eye     Phacoemulsification with  intraocular lens, Right eye (22014)  With ORA  LASER assist     DATE OF SURGERY: 05/16/2022     IMPLANT: [id number] 17.5    Patient presents for a one day P/O OD. Patient notes vision as stable.   Patient denies eye pain.     PGB TID OD    Last edited by Deondre Sahni on 5/17/2022  9:19 AM. (History)            Assessment /Plan     For exam results, see Encounter Report.    Post-operative state    Nuclear sclerotic cataract, bilateral      Slit lamp exam:  L/L: nl  K: clear, wound sealed  AC: 1+ cell  Lens: IOL centered and stable    POD1 s/p Phaco/IOL  Appropriate precautions and post op medications reviewed.  Patient instructed to call or come in if symptoms of redness, decreased vision, or pain are experienced.  Edmundo Asif

## 2022-05-18 ENCOUNTER — OFFICE VISIT (OUTPATIENT)
Dept: URGENT CARE | Facility: CLINIC | Age: 82
End: 2022-05-18
Payer: MEDICARE

## 2022-05-18 ENCOUNTER — TELEPHONE (OUTPATIENT)
Dept: OPHTHALMOLOGY | Facility: CLINIC | Age: 82
End: 2022-05-18
Payer: MEDICARE

## 2022-05-18 VITALS
TEMPERATURE: 98 F | SYSTOLIC BLOOD PRESSURE: 127 MMHG | DIASTOLIC BLOOD PRESSURE: 74 MMHG | RESPIRATION RATE: 20 BRPM | HEIGHT: 66 IN | BODY MASS INDEX: 28.12 KG/M2 | HEART RATE: 72 BPM | OXYGEN SATURATION: 95 % | WEIGHT: 175 LBS

## 2022-05-18 DIAGNOSIS — U07.1 COVID-19 VIRUS DETECTED: ICD-10-CM

## 2022-05-18 DIAGNOSIS — U07.1 COVID: Primary | ICD-10-CM

## 2022-05-18 LAB
CTP QC/QA: YES
SARS-COV-2 RDRP RESP QL NAA+PROBE: POSITIVE

## 2022-05-18 PROCEDURE — 99214 OFFICE O/P EST MOD 30 MIN: CPT | Mod: CR,S$GLB,, | Performed by: FAMILY MEDICINE

## 2022-05-18 PROCEDURE — U0002 COVID-19 LAB TEST NON-CDC: HCPCS | Mod: QW,CR,S$GLB, | Performed by: FAMILY MEDICINE

## 2022-05-18 PROCEDURE — U0002: ICD-10-PCS | Mod: QW,CR,S$GLB, | Performed by: FAMILY MEDICINE

## 2022-05-18 PROCEDURE — 99214 PR OFFICE/OUTPT VISIT, EST, LEVL IV, 30-39 MIN: ICD-10-PCS | Mod: CR,S$GLB,, | Performed by: FAMILY MEDICINE

## 2022-05-18 RX ORDER — BENZONATATE 100 MG/1
100 CAPSULE ORAL EVERY 6 HOURS PRN
Qty: 30 CAPSULE | Refills: 1 | Status: SHIPPED | OUTPATIENT
Start: 2022-05-18 | End: 2023-05-18

## 2022-05-18 NOTE — TELEPHONE ENCOUNTER
----- Message from Mar Pereira sent at 5/18/2022  3:26 PM CDT -----  Regarding: r/s due to illness  Contact: Pt  Pt tested positive for covid today : 05/18/2022    Please contact the pt @ [phone]    Pt need to reschedule her appts.

## 2022-05-18 NOTE — PROGRESS NOTES
"Subjective:       Patient ID: Ning Rodriguez is a 82 y.o. female.    Vitals:  height is 5' 6" (1.676 m) and weight is 79.4 kg (175 lb). Her oral temperature is 97.5 °F (36.4 °C). Her blood pressure is 127/74 and her pulse is 72. Her respiration is 20 and oxygen saturation is 95%.     Chief Complaint: No chief complaint on file.    Pt explain that she was exposed to covid from her , daughter and she started to have symptoms on yesterday and she would liked to be tested for covid in today visited.  Pt explain that she started to  have pain in her right shoulder last week after she came back from a out stated trip and she explain that when she breathe her chest have discomfort and she would liked to have a xray of her chest and shoulder in today visited.    Cough  This is a new problem. The current episode started yesterday. The problem has been unchanged. The problem occurs every few minutes. The cough is non-productive. Associated symptoms include chills, headaches, nasal congestion, postnasal drip and a sore throat. Pertinent negatives include no chest pain, ear congestion, ear pain, shortness of breath or wheezing.       Constitution: Positive for chills.   HENT: Positive for postnasal drip and sore throat. Negative for ear pain.    Cardiovascular: Negative for chest pain.   Respiratory: Positive for cough. Negative for shortness of breath and wheezing.    Neurological: Positive for headaches.       Objective:      Physical Exam   Constitutional: She does not appear ill. No distress. obesity  HENT:   Head: Normocephalic and atraumatic.   Nose: Congestion present.   Mouth/Throat: Mucous membranes are moist. Posterior oropharyngeal erythema present.   Eyes: Pupils are equal, round, and reactive to light. Extraocular movement intact   Neck: Neck supple.   Cardiovascular: Normal rate, regular rhythm, normal heart sounds and normal pulses.   Pulmonary/Chest: Effort normal and breath sounds normal. "   Abdominal: Normal appearance. Soft.   Lymphadenopathy:     She has no cervical adenopathy.   Neurological: She is alert.   Nursing note and vitals reviewed.    Results for orders placed or performed in visit on 05/18/22   POCT COVID-19 Rapid Screening   Result Value Ref Range    POC Rapid COVID Positive (A) Negative     Acceptable Yes          Assessment:       1. COVID          Plan:         COVID  -     POCT COVID-19 Rapid Screening  -     Ambulatory referral/consult to [facility]  -     nirmatrelvir-ritonavir 150 mg x 2- 100 mg copackaged tablets (EUA); Take 3 tablets by mouth 2 (two) times daily for 5 days. Each dose contains 2 nirmatrelvir (pink tablets) and 1 ritonavir (white tablet). Take all 3 tablets together  Dispense: 30 tablet; Refill: 0  -     benzonatate (TESSALON PERLES) 100 MG capsule; Take 1 capsule (100 mg total) by mouth every 6 (six) hours as needed for Cough.  Dispense: 30 capsule; Refill: 1    discussed symptom monitoring, contact notification and isolation X 5 days. Discussed possible benefits of aspirin, Vitamin C, D and zinc. RTC prn worsening symptoms. ER precautions reviewed

## 2022-05-20 ENCOUNTER — INFUSION (OUTPATIENT)
Dept: INFECTIOUS DISEASES | Facility: HOSPITAL | Age: 82
End: 2022-05-20
Attending: FAMILY MEDICINE
Payer: MEDICARE

## 2022-05-20 VITALS
HEIGHT: 66 IN | SYSTOLIC BLOOD PRESSURE: 107 MMHG | WEIGHT: 175.25 LBS | TEMPERATURE: 98 F | RESPIRATION RATE: 20 BRPM | BODY MASS INDEX: 28.16 KG/M2 | DIASTOLIC BLOOD PRESSURE: 55 MMHG | OXYGEN SATURATION: 98 % | HEART RATE: 49 BPM

## 2022-05-20 DIAGNOSIS — U07.1 COVID-19: Primary | ICD-10-CM

## 2022-05-20 PROCEDURE — 63600175 PHARM REV CODE 636 W HCPCS: Performed by: INTERNAL MEDICINE

## 2022-05-20 PROCEDURE — M0222 HC IV INJECTION, BEBTELOVIMAB, INCL POST ADMIN MONIT: HCPCS | Performed by: INTERNAL MEDICINE

## 2022-05-20 RX ORDER — EPINEPHRINE 0.3 MG/.3ML
0.3 INJECTION SUBCUTANEOUS
Status: ACTIVE | OUTPATIENT
Start: 2022-05-20 | End: 2022-05-23

## 2022-05-20 RX ORDER — ALBUTEROL SULFATE 90 UG/1
2 AEROSOL, METERED RESPIRATORY (INHALATION)
Status: ACTIVE | OUTPATIENT
Start: 2022-05-20 | End: 2022-05-23

## 2022-05-20 RX ORDER — ACETAMINOPHEN 325 MG/1
650 TABLET ORAL
Status: ACTIVE | OUTPATIENT
Start: 2022-05-20 | End: 2022-05-21

## 2022-05-20 RX ORDER — BEBTELOVIMAB 87.5 MG/ML
175 INJECTION, SOLUTION INTRAVENOUS
Status: COMPLETED | OUTPATIENT
Start: 2022-05-20 | End: 2022-05-20

## 2022-05-20 RX ORDER — ONDANSETRON 4 MG/1
4 TABLET, ORALLY DISINTEGRATING ORAL
Status: ACTIVE | OUTPATIENT
Start: 2022-05-20 | End: 2022-05-21

## 2022-05-20 RX ORDER — DIPHENHYDRAMINE HYDROCHLORIDE 50 MG/ML
25 INJECTION INTRAMUSCULAR; INTRAVENOUS
Status: ACTIVE | OUTPATIENT
Start: 2022-05-20 | End: 2022-05-21

## 2022-05-20 RX ADMIN — BEBTELOVIMAB 175 MG: 87.5 INJECTION, SOLUTION INTRAVENOUS at 11:05

## 2022-05-20 NOTE — PROGRESS NOTES
Patient arrives for Bebtelovimab IV Injection. Ambulatory. Pt AAox3. No distress noted. RR even and unlabored.     Symptoms and onset date: Cough, sneezing, phlegm, fever, chills, body aches    Tested COVID + on 5/18

## 2022-05-20 NOTE — PROGRESS NOTES
Patient remains with no signs of complications noted. Patient received Bebtelovimab IV Injection according to FDA recommendations and [facility] SOP without complications noted and left with mask in place. Drug fact sheet provided. Pt discharged home. Ambulatory. Remains AAox3. No distress noted. RR even and unlabored.

## 2022-05-21 RX ORDER — CEVIMELINE HYDROCHLORIDE 30 MG/1
30 CAPSULE ORAL 3 TIMES DAILY
Qty: 90 CAPSULE | Refills: 11 | Status: SHIPPED | OUTPATIENT
Start: 2022-05-21 | End: 2023-05-21

## 2022-05-23 ENCOUNTER — TELEPHONE (OUTPATIENT)
Dept: OPHTHALMOLOGY | Facility: CLINIC | Age: 82
End: 2022-05-23
Payer: MEDICARE

## 2022-05-23 NOTE — TELEPHONE ENCOUNTER
----- Message from Pauline Roberts sent at 5/23/2022  3:25 PM CDT -----  Contact: Ning @ [phone]  Pt wanted to know if the appt that was canceled is still available because her 5 day has passed

## 2022-06-07 ENCOUNTER — OFFICE VISIT (OUTPATIENT)
Dept: OPHTHALMOLOGY | Facility: CLINIC | Age: 82
End: 2022-06-07
Attending: OPHTHALMOLOGY
Payer: MEDICARE

## 2022-06-07 DIAGNOSIS — Z98.890 POST-OPERATIVE STATE: Primary | ICD-10-CM

## 2022-06-07 DIAGNOSIS — H25.13 NUCLEAR SCLEROTIC CATARACT, BILATERAL: ICD-10-CM

## 2022-06-07 PROCEDURE — 92136 OPHTHALMIC BIOMETRY: CPT | Mod: PBBFAC | Performed by: OPHTHALMOLOGY

## 2022-06-07 PROCEDURE — 99999 PR PBB SHADOW E&M-EST. PATIENT-LVL III: ICD-10-PCS | Mod: PBBFAC,,, | Performed by: OPHTHALMOLOGY

## 2022-06-07 PROCEDURE — 99213 OFFICE O/P EST LOW 20 MIN: CPT | Mod: PBBFAC | Performed by: OPHTHALMOLOGY

## 2022-06-07 PROCEDURE — 99999 PR PBB SHADOW E&M-EST. PATIENT-LVL III: CPT | Mod: PBBFAC,,, | Performed by: OPHTHALMOLOGY

## 2022-06-07 PROCEDURE — 92136 IOL MASTER - OU - BOTH EYES: ICD-10-PCS | Mod: 26,S$PBB,LT, | Performed by: OPHTHALMOLOGY

## 2022-06-07 PROCEDURE — 99024 PR POST-OP FOLLOW-UP VISIT: ICD-10-PCS | Mod: POP,,, | Performed by: OPHTHALMOLOGY

## 2022-06-07 PROCEDURE — 99024 POSTOP FOLLOW-UP VISIT: CPT | Mod: POP,,, | Performed by: OPHTHALMOLOGY

## 2022-06-07 RX ORDER — PHENYLEPHRINE HYDROCHLORIDE 100 MG/ML
1 SOLUTION/ DROPS OPHTHALMIC
Status: CANCELLED | OUTPATIENT
Start: 2022-06-07

## 2022-06-07 RX ORDER — TROPICAMIDE 10 MG/ML
1 SOLUTION/ DROPS OPHTHALMIC
Status: CANCELLED | OUTPATIENT
Start: 2022-06-07

## 2022-06-07 RX ORDER — PHENYLEPHRINE HYDROCHLORIDE 25 MG/ML
1 SOLUTION/ DROPS OPHTHALMIC
Status: CANCELLED | OUTPATIENT
Start: 2022-06-07

## 2022-06-07 RX ORDER — MOXIFLOXACIN 5 MG/ML
1 SOLUTION/ DROPS OPHTHALMIC
Status: CANCELLED | OUTPATIENT
Start: 2022-06-07

## 2022-06-07 RX ORDER — TETRACAINE HYDROCHLORIDE 5 MG/ML
1 SOLUTION OPHTHALMIC
Status: CANCELLED | OUTPATIENT
Start: 2022-06-07

## 2022-06-07 NOTE — H&P (VIEW-ONLY)
HPI     Right Eye  Phacoemulsification with  intraocular lens, Right eye (61360)  With ORA  LASER assist  DATE OF SURGERY: 05/16/2022  IMPLANT: [email or web address] 17.5  Patient presents for a one day P/O OD. Patient notes vision as stable.   Patient denies eye pain.     PGB TID OD    Last edited by Deondre Sahni on 6/7/2022 10:18 AM. (History)            Assessment /Plan     For exam results, see Encounter Report.    Post-operative state    Nuclear sclerotic cataract, bilateral      Slit lamp exam:  L/L: nl  K: clear, wound sealed  AC: trace cell  Iris/Lens: IOL centered and stable    POW1 s/p phaco: Surgery healing well with no signs of infection or abnormal inflammation.    Patient wishes to proceed with surgery in the second eye. Risks, benefits, alternatives reviewed. IOL selection reviewed.    Right eye  IOL: TFNT00 VS 30 17.5 , IMPLANT: [email or web address] 17.5 with 20/40 result     Left eye  IOL: CNWTT3 16.0       The patient expresses a desire to reduce spectacle dependence. I reviewed various IOL and LASER refractive surgical options and we will attempt to minimize spectacle dependence by managing astigmatism and optimizing IOL selection. Femtosecond LASER assisted cataract surgery (FLACS) technology was explained to the patient with educational videos and discussion.  The patient voices understanding and wishes to implement this technology during the cataract procedure.  I explained the increased precision of the LASER versus manual techniques, especially as it relates to astigmatism reduction with arcuate incisions.  I emphasized that although our goal is to reduce the need for refractive correction after surgery, there may still be a need for spectacle correction to achieve optimal visual acuity, and that a reasonable range of functional vision should be the expectation.  No guarantees are made about post operative refraction or visual acuity, as the eye may heal in unpredictable ways, and the standard risks,  benefits, and alternatives to cataract surgery were explained.  The patient understands that the refractive portions of this cataract procedure are not covered by insurance, and that there is an out of pocket expense of $2250 per eye. I also explained that even though our pre-operative plan is to utilize advanced refractive technologies during surgery, that I may decide to eliminate part or all of this plan if surgical challenges or complications arise, or I feel that it is not in the patient's best interest. Consent forms and an ABN form were given to the patient to review.    ORA ONLY FOR CYL MEASUREMENT/TORIC DECISION

## 2022-06-07 NOTE — PROGRESS NOTES
HPI     Right Eye  Phacoemulsification with  intraocular lens, Right eye (59730)  With ORA  LASER assist  DATE OF SURGERY: 05/16/2022  IMPLANT: [email or web address] 17.5  Patient presents for a one day P/O OD. Patient notes vision as stable.   Patient denies eye pain.     PGB TID OD    Last edited by Deondre Sahni on 6/7/2022 10:18 AM. (History)            Assessment /Plan     For exam results, see Encounter Report.    Post-operative state    Nuclear sclerotic cataract, bilateral      Slit lamp exam:  L/L: nl  K: clear, wound sealed  AC: trace cell  Iris/Lens: IOL centered and stable    POW1 s/p phaco: Surgery healing well with no signs of infection or abnormal inflammation.    Patient wishes to proceed with surgery in the second eye. Risks, benefits, alternatives reviewed. IOL selection reviewed.    Right eye  IOL: TFNT00 VS 30 17.5 , IMPLANT: CNWTT0 17.5 with 20/40 result     Left eye  IOL: CNWTT3 16.0       The patient expresses a desire to reduce spectacle dependence. I reviewed various IOL and LASER refractive surgical options and we will attempt to minimize spectacle dependence by managing astigmatism and optimizing IOL selection. Femtosecond LASER assisted cataract surgery (FLACS) technology was explained to the patient with educational videos and discussion.  The patient voices understanding and wishes to implement this technology during the cataract procedure.  I explained the increased precision of the LASER versus manual techniques, especially as it relates to astigmatism reduction with arcuate incisions.  I emphasized that although our goal is to reduce the need for refractive correction after surgery, there may still be a need for spectacle correction to achieve optimal visual acuity, and that a reasonable range of functional vision should be the expectation.  No guarantees are made about post operative refraction or visual acuity, as the eye may heal in unpredictable ways, and the standard risks,  benefits, and alternatives to cataract surgery were explained.  The patient understands that the refractive portions of this cataract procedure are not covered by insurance, and that there is an out of pocket expense of $2250 per eye. I also explained that even though our pre-operative plan is to utilize advanced refractive technologies during surgery, that I may decide to eliminate part or all of this plan if surgical challenges or complications arise, or I feel that it is not in the patient's best interest. Consent forms and an ABN form were given to the patient to review.    ORA ONLY FOR CYL MEASUREMENT/TORIC DECISION

## 2022-06-15 ENCOUNTER — TELEPHONE (OUTPATIENT)
Dept: OPHTHALMOLOGY | Facility: CLINIC | Age: 82
End: 2022-06-15
Payer: MEDICARE

## 2022-06-15 NOTE — TELEPHONE ENCOUNTER
Patient given arrival time of 8:00 am on Monday June 20. Nothing to eat or drink after 9 pm.  Water, Gatorade after 9 pm until leaves home.  Start drops into the operative eye today. [address]

## 2022-06-20 ENCOUNTER — HOSPITAL ENCOUNTER (OUTPATIENT)
Facility: OTHER | Age: 82
Discharge: HOME | End: 2022-06-20
Attending: OPHTHALMOLOGY | Admitting: OPHTHALMOLOGY
Payer: MEDICARE

## 2022-06-20 ENCOUNTER — ANESTHESIA EVENT (OUTPATIENT)
Dept: SURGERY | Facility: OTHER | Age: 82
End: 2022-06-20
Payer: MEDICARE

## 2022-06-20 ENCOUNTER — ANESTHESIA (OUTPATIENT)
Dept: SURGERY | Facility: OTHER | Age: 82
End: 2022-06-20
Payer: MEDICARE

## 2022-06-20 VITALS
OXYGEN SATURATION: 96 % | DIASTOLIC BLOOD PRESSURE: 67 MMHG | WEIGHT: 173 LBS | HEART RATE: 69 BPM | RESPIRATION RATE: 16 BRPM | TEMPERATURE: 98 F | BODY MASS INDEX: 27.8 KG/M2 | SYSTOLIC BLOOD PRESSURE: 121 MMHG | HEIGHT: 66 IN

## 2022-06-20 DIAGNOSIS — H25.11 NUCLEAR SCLEROTIC CATARACT OF RIGHT EYE: Primary | ICD-10-CM

## 2022-06-20 DIAGNOSIS — H25.13 NUCLEAR SCLEROTIC CATARACT, BILATERAL: ICD-10-CM

## 2022-06-20 DIAGNOSIS — H25.12 NUCLEAR SCLEROTIC CATARACT OF LEFT EYE: ICD-10-CM

## 2022-06-20 DIAGNOSIS — Z98.890 POST-OPERATIVE STATE: ICD-10-CM

## 2022-06-20 LAB — POCT GLUCOSE: 117 MG/DL (ref 70–110)

## 2022-06-20 PROCEDURE — 37000008 HC ANESTHESIA 1ST 15 MINUTES: Performed by: OPHTHALMOLOGY

## 2022-06-20 PROCEDURE — 37000009 HC ANESTHESIA EA ADD 15 MINS: Performed by: OPHTHALMOLOGY

## 2022-06-20 PROCEDURE — 66984 XCAPSL CTRC RMVL W/O ECP: CPT | Mod: 79,LT,, | Performed by: OPHTHALMOLOGY

## 2022-06-20 PROCEDURE — V2632 POST CHMBR INTRAOCULAR LENS: HCPCS | Performed by: OPHTHALMOLOGY

## 2022-06-20 PROCEDURE — 66999 UNLISTED PX ANT SEGMENT EYE: CPT | Mod: CSM,LT,, | Performed by: OPHTHALMOLOGY

## 2022-06-20 PROCEDURE — 66984 PR REMOVAL, CATARACT, W/INSRT INTRAOC LENS, W/O ENDO CYCLO: ICD-10-PCS | Mod: 79,LT,, | Performed by: OPHTHALMOLOGY

## 2022-06-20 PROCEDURE — 36000707: Performed by: OPHTHALMOLOGY

## 2022-06-20 PROCEDURE — 63600175 PHARM REV CODE 636 W HCPCS: Performed by: NURSE ANESTHETIST, CERTIFIED REGISTERED

## 2022-06-20 PROCEDURE — 36000706: Performed by: OPHTHALMOLOGY

## 2022-06-20 PROCEDURE — 66999 PR FEMTO MFIOL: ICD-10-PCS | Mod: CSM,LT,, | Performed by: OPHTHALMOLOGY

## 2022-06-20 PROCEDURE — 25000003 PHARM REV CODE 250: Performed by: OPHTHALMOLOGY

## 2022-06-20 PROCEDURE — 71000015 HC POSTOP RECOV 1ST HR: Performed by: OPHTHALMOLOGY

## 2022-06-20 RX ORDER — MOXIFLOXACIN 5 MG/ML
SOLUTION/ DROPS OPHTHALMIC
Status: DISCONTINUED | OUTPATIENT
Start: 2022-06-20 | End: 2022-06-20 | Stop reason: HOSPADM

## 2022-06-20 RX ORDER — PROPARACAINE HYDROCHLORIDE 5 MG/ML
1 SOLUTION/ DROPS OPHTHALMIC
Status: DISCONTINUED | OUTPATIENT
Start: 2022-06-20 | End: 2022-06-20 | Stop reason: HOSPADM

## 2022-06-20 RX ORDER — TROPICAMIDE 10 MG/ML
1 SOLUTION/ DROPS OPHTHALMIC
Status: COMPLETED | OUTPATIENT
Start: 2022-06-20 | End: 2022-06-20

## 2022-06-20 RX ORDER — DIPHENHYDRAMINE HYDROCHLORIDE 50 MG/ML
12.5 INJECTION INTRAMUSCULAR; INTRAVENOUS EVERY 30 MIN PRN
Status: CANCELLED | OUTPATIENT
Start: 2022-06-20

## 2022-06-20 RX ORDER — SODIUM CHLORIDE 0.9 % (FLUSH) 0.9 %
3 SYRINGE (ML) INJECTION
Status: DISCONTINUED | OUTPATIENT
Start: 2022-06-20 | End: 2022-06-20 | Stop reason: HOSPADM

## 2022-06-20 RX ORDER — LIDOCAINE HYDROCHLORIDE 40 MG/ML
INJECTION, SOLUTION RETROBULBAR
Status: DISCONTINUED | OUTPATIENT
Start: 2022-06-20 | End: 2022-06-20 | Stop reason: HOSPADM

## 2022-06-20 RX ORDER — MOXIFLOXACIN 5 MG/ML
1 SOLUTION/ DROPS OPHTHALMIC
Status: COMPLETED | OUTPATIENT
Start: 2022-06-20 | End: 2022-06-20

## 2022-06-20 RX ORDER — TETRACAINE HYDROCHLORIDE 5 MG/ML
1 SOLUTION OPHTHALMIC
Status: COMPLETED | OUTPATIENT
Start: 2022-06-20 | End: 2022-06-20

## 2022-06-20 RX ORDER — PROCHLORPERAZINE EDISYLATE 5 MG/ML
5 INJECTION INTRAMUSCULAR; INTRAVENOUS EVERY 30 MIN PRN
Status: CANCELLED | OUTPATIENT
Start: 2022-06-20

## 2022-06-20 RX ORDER — ACETAMINOPHEN 325 MG/1
650 TABLET ORAL EVERY 4 HOURS PRN
Status: DISCONTINUED | OUTPATIENT
Start: 2022-06-20 | End: 2022-06-20 | Stop reason: HOSPADM

## 2022-06-20 RX ORDER — PHENYLEPHRINE HYDROCHLORIDE 25 MG/ML
1 SOLUTION/ DROPS OPHTHALMIC
Status: COMPLETED | OUTPATIENT
Start: 2022-06-20 | End: 2022-06-20

## 2022-06-20 RX ORDER — MIDAZOLAM HYDROCHLORIDE 1 MG/ML
INJECTION INTRAMUSCULAR; INTRAVENOUS
Status: DISCONTINUED | OUTPATIENT
Start: 2022-06-20 | End: 2022-06-20

## 2022-06-20 RX ORDER — HYDROMORPHONE HYDROCHLORIDE 2 MG/ML
0.4 INJECTION, SOLUTION INTRAMUSCULAR; INTRAVENOUS; SUBCUTANEOUS EVERY 5 MIN PRN
Status: CANCELLED | OUTPATIENT
Start: 2022-06-20

## 2022-06-20 RX ORDER — MEPERIDINE HYDROCHLORIDE 25 MG/ML
12.5 INJECTION INTRAMUSCULAR; INTRAVENOUS; SUBCUTANEOUS ONCE AS NEEDED
Status: CANCELLED | OUTPATIENT
Start: 2022-06-20 | End: 2022-06-21

## 2022-06-20 RX ORDER — FENTANYL CITRATE 50 UG/ML
INJECTION, SOLUTION INTRAMUSCULAR; INTRAVENOUS
Status: DISCONTINUED | OUTPATIENT
Start: 2022-06-20 | End: 2022-06-20

## 2022-06-20 RX ORDER — OXYCODONE HYDROCHLORIDE 5 MG/1
5 TABLET ORAL
Status: CANCELLED | OUTPATIENT
Start: 2022-06-20

## 2022-06-20 RX ADMIN — MOXIFLOXACIN 1 DROP: 5 SOLUTION/ DROPS OPHTHALMIC at 08:06

## 2022-06-20 RX ADMIN — MIDAZOLAM HYDROCHLORIDE 1 MG: 1 INJECTION, SOLUTION INTRAMUSCULAR; INTRAVENOUS at 10:06

## 2022-06-20 RX ADMIN — FENTANYL CITRATE 50 MCG: 50 INJECTION, SOLUTION INTRAMUSCULAR; INTRAVENOUS at 10:06

## 2022-06-20 RX ADMIN — MIDAZOLAM HYDROCHLORIDE 2 MG: 1 INJECTION, SOLUTION INTRAMUSCULAR; INTRAVENOUS at 09:06

## 2022-06-20 RX ADMIN — PHENYLEPHRINE HYDROCHLORIDE 1 DROP: 25 SOLUTION/ DROPS OPHTHALMIC at 08:06

## 2022-06-20 RX ADMIN — TROPICAMIDE 1 DROP: 10 SOLUTION/ DROPS OPHTHALMIC at 08:06

## 2022-06-20 RX ADMIN — FENTANYL CITRATE 50 MCG: 50 INJECTION, SOLUTION INTRAMUSCULAR; INTRAVENOUS at 09:06

## 2022-06-20 RX ADMIN — TETRACAINE HYDROCHLORIDE 1 DROP: 5 SOLUTION OPHTHALMIC at 08:06

## 2022-06-20 RX ADMIN — MOXIFLOXACIN 1 DROP: 5 SOLUTION/ DROPS OPHTHALMIC at 10:06

## 2022-06-20 NOTE — ANESTHESIA POSTPROCEDURE EVALUATION
Anesthesia Post Evaluation    Patient: Ning Rodriguez    Procedure(s) Performed: Procedure(s) (LRB):  EXTRACTION, CATARACT, WITH IOL INSERTION (Left)    Final Anesthesia Type: MAC      Patient location during evaluation: [facility]  Patient participation: Yes- Able to Participate  Level of consciousness: awake and alert  Post-procedure vital signs: reviewed and stable  Pain management: adequate  Airway patency: patent    PONV status at discharge: No PONV  Anesthetic complications: no      Cardiovascular status: blood pressure returned to baseline  Respiratory status: unassisted and spontaneous ventilation  Hydration status: euvolemic  Follow-up not needed.          Vitals Value Taken Time   /78 06/20/22 0835   Temp 36.7 °C (98 °F) 06/20/22 0835   Pulse 78 06/20/22 0835   Resp 18 06/20/22 0835   SpO2 98 % 06/20/22 0835         No case tracking events are documented in the log.      Pain/Maris Score: No data recorded       15-Jul-2021

## 2022-06-20 NOTE — ANESTHESIA PREPROCEDURE EVALUATION
06/20/2022  Ning Rodriguez is a 82 y.o., female.      Pre-op Assessment    I have reviewed the Patient Summary Reports.     I have reviewed the Nursing Notes. I have reviewed the NPO Status.   I have reviewed the Medications.     Review of Systems  Anesthesia Hx:  No problems with previous Anesthesia    Social:  Non-Smoker    Cardiovascular:   Hypertension, well controlled    Pulmonary:  Pulmonary Normal    Hepatic/GI:  Hepatic/GI Normal    Endocrine:   Diabetes, well controlled        Physical Exam  General: Well nourished    Airway:  Mallampati: II   Mouth Opening: Normal  TM Distance: Normal  Tongue: Normal  Neck ROM: Normal ROM    Dental:  Intact        Anesthesia Plan  Type of Anesthesia, risks & benefits discussed:    Anesthesia Type: MAC  Intra-op Monitoring Plan: Standard ASA Monitors  Post Op Pain Control Plan: multimodal analgesia  Induction:  IV  Informed Consent: Informed consent signed with the Patient and all parties understand the risks and agree with anesthesia plan.  All questions answered.   ASA Score: 3  Anesthesia Plan Notes: Right Eye sx last month w/NAAC    Ready For Surgery From Anesthesia Perspective.     .

## 2022-06-20 NOTE — OP NOTE
SURGEON:  Tami Zarate M.D.    PREOPERATIVE DIAGNOSIS:    Nuclear Sclerotic Cataract Left Eye    POSTOPERATIVE DIAGNOSIS:    Nuclear Sclerotic Cataract Left  Eye    PROCEDURES:    Phacoemulsification with  intraocular lens, Left eye (23905)  With ORA LASER assist    DATE OF SURGERY: 06/20/2022    IMPLANT: [email or web address] 16.0     ANESTHESIA:  MAC with topical Lidocaine    COMPLICATIONS:  None    ESTIMATED BLOOD LOSS: None    SPECIMENS: None    INDICATIONS:    The patient has a history of painless progressive visual loss and difficulty with activities of daily living, which specifically include difficult driving at night due to glare and difficulty reading small print, secondary to cataract formation.  After a thorough discussion of the risks, benefits, and alternatives to cataract surgery, including, but not limited to, the rare risks of infection, retinal detachment, hemorrhage, need for additional surgery, loss of vision, and even loss of the eye, the patient voices understanding and desires to proceed.    DESCRIPTION OF PROCEDURE:      The patients IOL calculations were reviewed, and the lens selection confirmed.   After verification and marking of the proper eye in the preop holding area, the patient was brought to the operating room in supine position where the eye was prepped and draped in standard sterile fashion with 5% Betadine and a lid speculum placed in the eye.   Topical 4% Lidocaine was used in addition to the preoperative anesthesia and the procedure was begun by the creation of a paracentesis incision through which viscoelastic was used to fill the anterior chamber.  Next, a keratome blade was used to create a triplanar temporal clear corneal incision and a cystotome and Utrata forceps used to fashion a continuous curvilinear capsulorrhexis.  Hydrodissection was carried out using the Freitas hydrodissection cannula and the nucleus was found to be mobile.  Phacoemulsification of the nucleus was carried out  using a quick chop technique, and all remaining epinuclear and cortical material was removed.  The eye was then reformed with Viscoelastic and ORA was used to verify the proper IOL power. The intraocular lens was then implanted into the capsular bag.  All remaining viscoelastics were removed from the eye and at the end of the case the pupil was round, the lens was well-centered within the capsular bag and all wounds were found to be water tight.  Drops of Vigamox and Pred Forte were instilled and a shield was placed over the eye. The patient will follow up with Dr. Zarate in the morning.

## 2022-06-21 ENCOUNTER — OFFICE VISIT (OUTPATIENT)
Dept: OPHTHALMOLOGY | Facility: CLINIC | Age: 82
End: 2022-06-21
Attending: OPHTHALMOLOGY
Payer: MEDICARE

## 2022-06-21 DIAGNOSIS — Z98.890 POST-OPERATIVE STATE: Primary | ICD-10-CM

## 2022-06-21 DIAGNOSIS — H25.13 NUCLEAR SCLEROTIC CATARACT, BILATERAL: ICD-10-CM

## 2022-06-21 PROCEDURE — 99024 POSTOP FOLLOW-UP VISIT: CPT | Mod: POP,,, | Performed by: OPHTHALMOLOGY

## 2022-06-21 PROCEDURE — 99024 PR POST-OP FOLLOW-UP VISIT: ICD-10-PCS | Mod: POP,,, | Performed by: OPHTHALMOLOGY

## 2022-06-21 PROCEDURE — 99999 PR PBB SHADOW E&M-EST. PATIENT-LVL III: ICD-10-PCS | Mod: PBBFAC,,, | Performed by: OPHTHALMOLOGY

## 2022-06-21 PROCEDURE — 99213 OFFICE O/P EST LOW 20 MIN: CPT | Mod: PBBFAC | Performed by: OPHTHALMOLOGY

## 2022-06-21 PROCEDURE — 99999 PR PBB SHADOW E&M-EST. PATIENT-LVL III: CPT | Mod: PBBFAC,,, | Performed by: OPHTHALMOLOGY

## 2022-06-21 NOTE — PROGRESS NOTES
HPI     Left eye (72879)  With ORA LASER assist  DATE OF SURGERY: 06/20/2022  IMPLANT: [email or web address] 16.0     Patient presents for a one day P/O OS. Patient notes vision as stable.   Patient denies eye pain.     PGB TID OS      Last edited by Deondre Sahni on 6/21/2022  8:51 AM. (History)            Assessment /Plan     For exam results, see Encounter Report.    Post-operative state    Nuclear sclerotic cataract, bilateral      Slit lamp exam:  L/L: nl  K: clear, wound sealed  AC: 1+ cell  Lens: IOL centered and stable    POD1 s/p Phaco/IOL  Appropriate precautions and post op medications reviewed.  Patient instructed to call or come in if symptoms of redness, decreased vision, or pain are experienced.

## 2022-07-19 ENCOUNTER — PATIENT MESSAGE (OUTPATIENT)
Dept: RESEARCH | Facility: CLINIC | Age: 82
End: 2022-07-19
Payer: MEDICARE

## 2022-08-11 ENCOUNTER — IMMUNIZATION (OUTPATIENT)
Dept: INTERNAL MEDICINE | Facility: CLINIC | Age: 82
End: 2022-08-11
Payer: MEDICARE

## 2022-08-11 DIAGNOSIS — Z23 NEED FOR VACCINATION: Primary | ICD-10-CM

## 2022-08-11 PROCEDURE — 91305 COVID-19, MRNA, LNP-S, PF, 30 MCG/0.3 ML DOSE VACCINE (PFIZER): CPT | Mod: PBBFAC,PO

## 2022-09-19 ENCOUNTER — OFFICE VISIT (OUTPATIENT)
Dept: OPTOMETRY | Facility: CLINIC | Age: 82
End: 2022-09-19
Payer: MEDICARE

## 2022-09-19 DIAGNOSIS — Z98.41 S/P BILATERAL CATARACT EXTRACTION: Primary | ICD-10-CM

## 2022-09-19 DIAGNOSIS — Z98.42 S/P BILATERAL CATARACT EXTRACTION: Primary | ICD-10-CM

## 2022-09-19 PROCEDURE — 99213 OFFICE O/P EST LOW 20 MIN: CPT | Mod: PBBFAC | Performed by: OPTOMETRIST

## 2022-09-19 PROCEDURE — 99999 PR PBB SHADOW E&M-EST. PATIENT-LVL III: CPT | Mod: PBBFAC,,, | Performed by: OPTOMETRIST

## 2022-09-19 PROCEDURE — 99999 PR PBB SHADOW E&M-EST. PATIENT-LVL III: ICD-10-PCS | Mod: PBBFAC,,, | Performed by: OPTOMETRIST

## 2022-09-19 PROCEDURE — 99024 PR POST-OP FOLLOW-UP VISIT: ICD-10-PCS | Mod: POP,,, | Performed by: OPTOMETRIST

## 2022-09-19 PROCEDURE — 92134 CPTRZ OPH DX IMG PST SGM RTA: CPT | Mod: PBBFAC | Performed by: OPTOMETRIST

## 2022-09-19 PROCEDURE — 99024 POSTOP FOLLOW-UP VISIT: CPT | Mod: POP,,, | Performed by: OPTOMETRIST

## 2022-09-19 NOTE — PROGRESS NOTES
HPI     Post-op Evaluation     Additional comments: 3 month phaco OS           Comments    Last eye exam was 6/21/22 with Dr. Zarate.  Patient states vision is better since cataract sx's. Was recently on a   trip and was having mucusy discharge and 2 opthalmologists on the trip   with her advised her to use AT's several times a day-drops have helped.     05/16/2022 IMPLANT: [email or web address] 17.5 OD  06/20/2022 IMPLANT: [email or web address] 16.0  OS          Last edited by Henny Neumann MA on 9/19/2022  1:48 PM.            Assessment /Plan     For exam results, see Encounter Report.    S/P bilateral cataract extraction  -     OCT- Retina          Pt doing well. No rx given. Recommend artificial tears at least 2x/day OU.  No CME OU.  Retina flat and intact OU--no holes, tears, breaks, or RDs.  RTC 1 year for YAG OU with Elliot.

## 2022-09-22 ENCOUNTER — OFFICE VISIT (OUTPATIENT)
Dept: URGENT CARE | Facility: CLINIC | Age: 82
End: 2022-09-22
Payer: MEDICARE

## 2022-09-22 VITALS
OXYGEN SATURATION: 97 % | HEIGHT: 66 IN | RESPIRATION RATE: 18 BRPM | DIASTOLIC BLOOD PRESSURE: 79 MMHG | HEART RATE: 79 BPM | WEIGHT: 173 LBS | SYSTOLIC BLOOD PRESSURE: 132 MMHG | BODY MASS INDEX: 27.8 KG/M2 | TEMPERATURE: 98 F

## 2022-09-22 DIAGNOSIS — L43.9 LICHEN PLANUS: Primary | ICD-10-CM

## 2022-09-22 DIAGNOSIS — B96.89 BACTERIAL UPPER RESPIRATORY INFECTION: Primary | ICD-10-CM

## 2022-09-22 DIAGNOSIS — J06.9 BACTERIAL UPPER RESPIRATORY INFECTION: Primary | ICD-10-CM

## 2022-09-22 DIAGNOSIS — R05.9 COUGH: ICD-10-CM

## 2022-09-22 LAB
CTP QC/QA: YES
SARS-COV-2 RDRP RESP QL NAA+PROBE: NEGATIVE

## 2022-09-22 PROCEDURE — 99213 OFFICE O/P EST LOW 20 MIN: CPT | Mod: S$GLB,,, | Performed by: NURSE PRACTITIONER

## 2022-09-22 PROCEDURE — 99213 PR OFFICE/OUTPT VISIT, EST, LEVL III, 20-29 MIN: ICD-10-PCS | Mod: S$GLB,,, | Performed by: NURSE PRACTITIONER

## 2022-09-22 PROCEDURE — U0002 COVID-19 LAB TEST NON-CDC: HCPCS | Mod: QW,CR,S$GLB, | Performed by: NURSE PRACTITIONER

## 2022-09-22 PROCEDURE — U0002: ICD-10-PCS | Mod: QW,CR,S$GLB, | Performed by: NURSE PRACTITIONER

## 2022-09-22 RX ORDER — PROMETHAZINE HYDROCHLORIDE AND DEXTROMETHORPHAN HYDROBROMIDE 6.25; 15 MG/5ML; MG/5ML
5 SYRUP ORAL NIGHTLY PRN
Qty: 180 ML | Refills: 0 | Status: SHIPPED | OUTPATIENT
Start: 2022-09-22

## 2022-09-22 RX ORDER — AMOXICILLIN AND CLAVULANATE POTASSIUM 875; 125 MG/1; MG/1
1 TABLET, FILM COATED ORAL 2 TIMES DAILY
Qty: 14 TABLET | Refills: 0 | Status: SHIPPED | OUTPATIENT
Start: 2022-09-22 | End: 2022-09-29

## 2022-09-22 NOTE — PROGRESS NOTES
"Subjective:       Patient ID: Ning Rodriguez is a 82 y.o. female.    Vitals:  height is 5' 6" (1.676 m) and weight is 78.5 kg (173 lb). Her temperature is 98.2 °F (36.8 °C). Her blood pressure is 132/79 and her pulse is 79. Her respiration is 18 and oxygen saturation is 97%.     Chief Complaint: Cough (Eye irritation)    Pt is an 81 yo female w/ c/o cough, chest congestion for 2 weeks. Pt also reports red eyes and irritation, clear drainage. She has been taking tessalon and otc eye drops for her symptoms with some improvement. No known sick contacts. She is vaccinated for covid.     Cough  This is a new problem. The current episode started 1 to 4 weeks ago. The problem has been gradually worsening. The cough is Productive of sputum. She has tried OTC cough suppressant for the symptoms. The treatment provided no relief.   Respiratory:  Positive for cough.      Objective:      Physical Exam   Constitutional: She is oriented to person, place, and time. She appears well-developed. She is cooperative.  Non-toxic appearance. She does not appear ill. No distress.   HENT:   Head: Normocephalic and atraumatic.   Ears:   Right Ear: Hearing, tympanic membrane, external ear and ear canal normal.   Left Ear: Hearing, tympanic membrane, external ear and ear canal normal.   Nose: Nose normal. No mucosal edema, rhinorrhea or nasal deformity. No epistaxis. Right sinus exhibits no maxillary sinus tenderness and no frontal sinus tenderness. Left sinus exhibits no maxillary sinus tenderness and no frontal sinus tenderness.   Mouth/Throat: Uvula is midline, oropharynx is clear and moist and mucous membranes are normal. No trismus in the jaw. Normal dentition. No uvula swelling. No oropharyngeal exudate, posterior oropharyngeal edema or posterior oropharyngeal erythema.   Eyes: Conjunctivae and lids are normal. No scleral icterus.   Neck: Trachea normal and phonation normal. Neck supple. No edema present. No erythema present. No " neck rigidity present.   Cardiovascular: Normal rate, regular rhythm, normal heart sounds and normal pulses.   Pulmonary/Chest: Effort normal and breath sounds normal. No respiratory distress. She has no decreased breath sounds. She has no wheezes. She has no rhonchi.   cough         Comments: cough    Abdominal: Normal appearance.   Musculoskeletal: Normal range of motion.         General: No deformity. Normal range of motion.   Neurological: She is alert and oriented to person, place, and time. She exhibits normal muscle tone. Coordination normal.   Skin: Skin is warm, dry, intact, not diaphoretic and not pale.   Psychiatric: Her speech is normal and behavior is normal. Judgment and thought content normal.   Nursing note and vitals reviewed.    Results for orders placed or performed in visit on 09/22/22   POCT COVID-19 Rapid Screening   Result Value Ref Range    POC Rapid COVID Negative Negative     Acceptable Yes            Assessment:       1. Bacterial upper respiratory infection    2. Cough          Plan:         Bacterial upper respiratory infection  -     amoxicillin-clavulanate 875-125mg (AUGMENTIN) 875-125 mg per tablet; Take 1 tablet by mouth 2 (two) times daily. for 7 days  Dispense: 14 tablet; Refill: 0    Cough  -     POCT COVID-19 Rapid Screening  -     promethazine-dextromethorphan (PROMETHAZINE-DM) 6.25-15 mg/5 mL Syrp; Take 5 mLs by mouth nightly as needed (cough).  Dispense: 180 mL; Refill: 0       Patient Instructions   - You must understand that you have received an Urgent Care treatment only and that you may be released before all of your medical problems are known or treated.   - You, the patient, will arrange for follow up care as instructed.   - If your condition worsens or fails to improve we recommend that you receive another evaluation at the ER immediately or contact your PCP to discuss your concerns.   - You can call [phone] or [phone] to help schedule an  appointment with the appropriate provider.    Drink plenty of fluids   Get lots of rest  Tylenol or ibuprofen for pain/fever  Mucinex DM for daytime cough  Prescription cough syrup for night time cough. This medication will make you drowsy. Do not drink alcohol or  Operate machinery while taking this medicine.   Saline nasal rinses to irrigate sinus cavities  Warm salt water gargles for sore throat

## 2022-09-22 NOTE — PATIENT INSTRUCTIONS
- You must understand that you have received an Urgent Care treatment only and that you may be released before all of your medical problems are known or treated.   - You, the patient, will arrange for follow up care as instructed.   - If your condition worsens or fails to improve we recommend that you receive another evaluation at the ER immediately or contact your PCP to discuss your concerns.   - You can call [phone] or [phone] to help schedule an appointment with the appropriate provider.    Drink plenty of fluids   Get lots of rest  Tylenol or ibuprofen for pain/fever  Mucinex DM for daytime cough  Prescription cough syrup for night time cough. This medication will make you drowsy. Do not drink alcohol or  Operate machinery while taking this medicine.   Saline nasal rinses to irrigate sinus cavities  Warm salt water gargles for sore throat

## 2022-10-10 ENCOUNTER — TELEPHONE (OUTPATIENT)
Dept: RHEUMATOLOGY | Facility: CLINIC | Age: 82
End: 2022-10-10
Payer: MEDICARE

## 2022-10-10 NOTE — TELEPHONE ENCOUNTER
Left voicemail asking patient to call the office back to speak with an MA regarding scheduling an new patient appointment.    ----- Message -----  From: Fatmata Marinelli  Sent: 10/6/2022   2:29 PM CDT  To: Harpreet Taylor Staff    Patient is calling to inquire about an appointment to see Dr. Mullins, multiple messages have been sent. Can someone please assist in scheduling this patient, thank you

## 2022-10-31 ENCOUNTER — HOSPITAL ENCOUNTER (OUTPATIENT)
Dept: RADIOLOGY | Facility: HOSPITAL | Age: 82
Discharge: HOME | End: 2022-10-31
Attending: INTERNAL MEDICINE
Payer: MEDICARE

## 2022-10-31 DIAGNOSIS — R05.3 CHRONIC COUGHING: ICD-10-CM

## 2022-10-31 PROCEDURE — 71046 X-RAY EXAM CHEST 2 VIEWS: CPT | Mod: TC,FY

## 2022-10-31 PROCEDURE — 71046 XR CHEST PA AND LATERAL: ICD-10-PCS | Mod: 26,,, | Performed by: RADIOLOGY

## 2022-10-31 PROCEDURE — 71046 X-RAY EXAM CHEST 2 VIEWS: CPT | Mod: 26,,, | Performed by: RADIOLOGY

## 2022-11-01 ENCOUNTER — TELEPHONE (OUTPATIENT)
Dept: OTOLARYNGOLOGY | Facility: CLINIC | Age: 82
End: 2022-11-01
Payer: MEDICARE

## 2022-11-01 NOTE — TELEPHONE ENCOUNTER
Contacted patients  and scheduled visit per message we received for a cough. Patient is now set up for first available at [facility] next week and spouse thanked me for calling.

## 2022-11-08 ENCOUNTER — OFFICE VISIT (OUTPATIENT)
Dept: OTOLARYNGOLOGY | Facility: CLINIC | Age: 82
End: 2022-11-08
Payer: MEDICARE

## 2022-11-08 VITALS
SYSTOLIC BLOOD PRESSURE: 118 MMHG | HEART RATE: 76 BPM | DIASTOLIC BLOOD PRESSURE: 70 MMHG | BODY MASS INDEX: 28.15 KG/M2 | WEIGHT: 174.38 LBS

## 2022-11-08 DIAGNOSIS — R05.3 CHRONIC COUGH: ICD-10-CM

## 2022-11-08 DIAGNOSIS — R04.0 EPISTAXIS: ICD-10-CM

## 2022-11-08 DIAGNOSIS — K21.9 LARYNGOPHARYNGEAL REFLUX: Primary | ICD-10-CM

## 2022-11-08 PROCEDURE — 30901 CONTROL OF NOSEBLEED: CPT | Mod: S$PBB,LT,, | Performed by: OTOLARYNGOLOGY

## 2022-11-08 PROCEDURE — 99999 PR PBB SHADOW E&M-EST. PATIENT-LVL III: CPT | Mod: PBBFAC,,, | Performed by: OTOLARYNGOLOGY

## 2022-11-08 PROCEDURE — 99999 PR PBB SHADOW E&M-EST. PATIENT-LVL III: ICD-10-PCS | Mod: PBBFAC,,, | Performed by: OTOLARYNGOLOGY

## 2022-11-08 PROCEDURE — 99204 PR OFFICE/OUTPT VISIT, NEW, LEVL IV, 45-59 MIN: ICD-10-PCS | Mod: 25,S$PBB,, | Performed by: OTOLARYNGOLOGY

## 2022-11-08 PROCEDURE — 30901 PR CTRL 2SEBLEED,ANTER,SIMPLE: ICD-10-PCS | Mod: S$PBB,LT,, | Performed by: OTOLARYNGOLOGY

## 2022-11-08 PROCEDURE — 31575 DIAGNOSTIC LARYNGOSCOPY: CPT | Mod: S$PBB,51,, | Performed by: OTOLARYNGOLOGY

## 2022-11-08 PROCEDURE — 31575 PR LARYNGOSCOPY, FLEXIBLE; DIAGNOSTIC: ICD-10-PCS | Mod: S$PBB,51,, | Performed by: OTOLARYNGOLOGY

## 2022-11-08 PROCEDURE — 99213 OFFICE O/P EST LOW 20 MIN: CPT | Mod: PBBFAC,25 | Performed by: OTOLARYNGOLOGY

## 2022-11-08 PROCEDURE — 31575 DIAGNOSTIC LARYNGOSCOPY: CPT | Mod: PBBFAC | Performed by: OTOLARYNGOLOGY

## 2022-11-08 PROCEDURE — 30901 CONTROL OF NOSEBLEED: CPT | Mod: PBBFAC | Performed by: OTOLARYNGOLOGY

## 2022-11-08 PROCEDURE — 99204 OFFICE O/P NEW MOD 45 MIN: CPT | Mod: 25,S$PBB,, | Performed by: OTOLARYNGOLOGY

## 2022-11-08 NOTE — PATIENT INSTRUCTIONS
ACID REFLUX   What is acid reflux?    When we eat, food passes from the throat and into the stomach through a tube called the esophagus. At the bottom of the esophagus is a ring of muscles that acts as a valve between the esophagus and stomach, called the lower esophageal sphincter. Smoking, alcohol, and certain types of food may weaken the sphincter, so it may stop closing properly. The contents in the stomach then may leak back, or reflux, into the esophagus. This problem is called gastroesophageal reflux disease (GERD). Symptoms of GERD include heartburn, belching, regurgitation of stomach contents, and swallowing difficulties.    Sometimes, the stomach acid travels up through the esophagus and spills into the larynx or pharynx (voice box). This is called laryngopharyngeal reflux (LPR) and is irritating to the vocal folds and surrounding tissues. Often, patients with LPR do not experience heartburn as a symptom. More commonly, symptoms of LPR include hoarseness, excessive mucous resulting in frequent throat clearing, post-nasal drip, coughing, throat soreness or burning, choking episodes, difficulty swallowing, and sensation of a lump in the throat.     How is acid reflux treated?   Treatment for acid reflux can involve any combination of medication, lifestyle modifications, and surgery.   Medications. Your doctor may prescribe a proton pump inhibitor (PPI) or an H2 blocker. If you are prescribed a PPI, take in on an empty stomach in the morning 30 minutes prior to eating breakfast. Keep in mind that it may take 4-6 weeks before symptoms begin to resolve, so do not stop medications without consulting your doctor.   Lifestyle and dietary modifications. Eat smaller meals at a slower pace. Avoid over-eating. If you are overweight, try to lose weight. Do not lie down or exercise directly after eating; eat your last meal of the day at least 2-3 hours prior to going to sleep. Avoid tight-fitting clothes. If you are  a smoker, reduce or quit smoking. Elevate your head of bed 4-6 inches by putting phone books under the legs at the head of your bed or buy a wedge pillow, but do not use more than two regular pillows as this causes the body to curl and compresses your stomach.     Food group Foods to avoid to reduce reflux   Beverages  Whole milk, 2% milk, chocolate milk/hot chocolate, alcohol, coffee (regular and decaf), caffeinated tea, mint tea, carbonated beverages, citrus juice    Breads/grains Commercial sweet rolls, doughnuts, croissants, and other high-fat pastries    Fruits and vegetables Fried or cream-style vegetables, tomatoes, tomato-based products, citrus fruits, hot peppers    Soups and seasonings Cream, cheese, tomato-based soups, vinegar    Meats and proteins Fatty or fried meat/fish, boateng, sausage, pepperoni, lunch meat, fried eggs    Fats and oil Lard, boateng drippings, salt pork, meat drippings, gravies, highly seasoned salad dressings, nuts    Sweets/desserts Anything made with or from chocolate, peppermint, spearmint, whole milk, or cream; high-fat pastries, gum, hard candy

## 2022-11-08 NOTE — PROGRESS NOTES
Chief Complaint   Patient presents with    Cough         82 y.o. female presents with longstanding history of cough. Occasionally with phlegm. Often feels that she has to clear her throat +globus. She does have a history of heartburn. Also with frequent left sided epistaxis.      Review of Systems     Constitutional: Negative for fatigue and unexpected weight change.   HENT: per HPI.  Eyes: Negative for visual disturbance.   Respiratory: Negative for shortness of breath, hemoptysis  Cardiovascular: Negative for chest pain and palpitations.   Genitourinary: Negative for dysuria and difficulty urinating.   Musculoskeletal: Negative for decreased ROM, back pain.   Skin: Negative for rash, sunburn, itching.   Neurological: Negative for dizziness and seizures.   Hematological: Negative for adenopathy. Does not bruise/bleed easily.   Psychiatric/Behavioral: Negative for agitation. The patient is not nervous/anxious.   Endocrine: Negative for rapid weight loss/weight gain, heat/cold intolerance.     Past Medical History:   Diagnosis Date    Allergy     Arthritis     Cataract     Diabetes mellitus, type 2     Hypertension     Thyroid disease        Past Surgical History:   Procedure Laterality Date    CATARACT EXTRACTION W/  INTRAOCULAR LENS IMPLANT Right 5/16/2022    Procedure: EXTRACTION, CATARACT, WITH IOL INSERTION;  Surgeon: Tami Zarate MD;  Location: [facility], [place] OR;  Service: Ophthalmology;  Laterality: Right;  [facility]A ONLY FOR CYL MEASUREMENT/TORIC DECISION    CATARACT EXTRACTION W/  INTRAOCULAR LENS IMPLANT Left 6/20/2022    Procedure: EXTRACTION, CATARACT, WITH IOL INSERTION;  Surgeon: Tami Zarate MD;  Location: [facility], [place] OR;  Service: Ophthalmology;  Laterality: Left;  ORA ONLY FOR CYL MEASUREMENT/TORIC DECISION    HEMORRHOID SURGERY         family history is not on file.    Pt  reports that she has quit smoking. She has never used smokeless tobacco. She reports that she does not drink alcohol and does not use  drugs.    Review of patient's allergies indicates:  No Known Allergies     Physical Exam    Vitals:    11/08/22 0905   BP: 118/70   Pulse: 76     Body mass index is 28.15 kg/m².    Physical Exam  Vitals and nursing note reviewed.   Constitutional:       General: She is not in acute distress.     Appearance: She is well-developed. She is not diaphoretic.   HENT:      Head: Normocephalic and atraumatic.      Right Ear: Hearing, tympanic membrane, ear canal and external ear normal. No decreased hearing noted. No middle ear effusion. Tympanic membrane has normal mobility.      Left Ear: Hearing, tympanic membrane, ear canal and external ear normal. No decreased hearing noted.  No middle ear effusion. Tympanic membrane has normal mobility.      Nose: Nose normal.      Left Nostril: No epistaxis.        Mouth/Throat:      Mouth: Mucous membranes are moist. No oral lesions.      Tongue: No lesions.      Palate: No mass and lesions.      Pharynx: Oropharynx is clear. Uvula midline.   Eyes:      General: Lids are normal.         Right eye: No discharge.         Left eye: No discharge.      Conjunctiva/sclera: Conjunctivae normal.      Pupils: Pupils are equal, round, and reactive to light.   Neck:      Thyroid: No thyroid mass or thyromegaly.      Trachea: Trachea and phonation normal. No tracheal tenderness or tracheal deviation.   Cardiovascular:      Heart sounds: Normal heart sounds.   Pulmonary:      Breath sounds: Normal breath sounds. No stridor.   Abdominal:      Palpations: Abdomen is soft.   Musculoskeletal:      Cervical back: Normal range of motion and neck supple. No edema or erythema.   Lymphadenopathy:      Cervical: No cervical adenopathy.   Skin:     General: Skin is warm and dry.      Coloration: Skin is not pale.      Findings: No erythema or rash.   Neurological:      Mental Status: She is alert and oriented to person, place, and time.     Procedure: Flexible laryngoscopy  In order to fully examine the  upper aerodigestive tract, including the larynx, in a patient with a hyperactive gag reflex, flexible endoscopy is required.  After explaining the procedure and obtaining verbal consent, a timeout was performed with the patient's participation according to the universal protocol. Both nasal cavities were anesthetized with 4% Xylocaine spray mixed with Garry-Synephrine. The flexible laryngoscope was inserted into the nasal cavity and advanced to visualize the nasal cavity, nasopharynx, the posterior oropharynx, hypopharynx, and the endolarynx with the above findings noted. The scope was removed and the procedure terminated. The patient tolerated this procedure well without apparent complication.      NP: No lesions of torus or posterior wall  OP: No lesions of posterior wall or BOT. BOT is soft to palpation  Larynx: No lesions of glottic or supraglottic larynx. Normal vocal fold mobility. There is posterior commissure erythema and edema.  HP: No lesions of pyriform sinuses or postcricoid region     I offered continued observation versus cautery of the prominent vessels on the septum. I explained that the risks of cautery include recurrence, perforation, and discomfort. The benefit would be potential sclerosis of the offending vessels with fewer nosebleeds. Time was allowed for questions, and all questions were answered to her apparent satisfaction. Verbal assent was obtained. The nasal cavity was anesthetized with pontocaine spray. The prominent vessels on the left lower nasal septum were cauterized with silver nitrate without impacting the inferior turbinate at the same point so as to minimize the risk of synechiae formation. The patient tolerated this procedure well without complication. The patient was counseled that there may be some drainage of black or grey material over the next couple of days - this is normal and reflects the composition of the cautery agent which was noted to be silver nitrate.      Assessment     1. Laryngopharyngeal reflux    2. Epistaxis    3. Chronic cough          Plan  We discussed the symptoms and physical exam findings consistent with laryngopharyngeal reflux. Discussed reflux precautions including not eating late at night and reflux diet. Patient was given a handout with information discussed as well as diet precations. Also with frequent left epistaxis, cauterization done today in clinic. Discussed prevention with vaseline and nasal saline as well as epistaxis management. All questions were answered. Return to clinic if symptoms fail to improve or worsen.

## 2023-03-01 ENCOUNTER — PATIENT MESSAGE (OUTPATIENT)
Dept: NEUROSURGERY | Facility: CLINIC | Age: 83
End: 2023-03-01

## 2023-03-01 ENCOUNTER — OFFICE VISIT (OUTPATIENT)
Dept: NEUROSURGERY | Facility: CLINIC | Age: 83
End: 2023-03-01
Payer: MEDICARE

## 2023-03-01 ENCOUNTER — HOSPITAL ENCOUNTER (OUTPATIENT)
Dept: RADIOLOGY | Facility: HOSPITAL | Age: 83
Discharge: HOME | End: 2023-03-01
Attending: NEUROLOGICAL SURGERY
Payer: MEDICARE

## 2023-03-01 DIAGNOSIS — I62.00 NONTRAUMATIC SUBDURAL HEMORRHAGE, UNSPECIFIED: Primary | ICD-10-CM

## 2023-03-01 DIAGNOSIS — S06.5XAA SDH (SUBDURAL HEMATOMA): Primary | ICD-10-CM

## 2023-03-01 DIAGNOSIS — I62.00 NONTRAUMATIC SUBDURAL HEMORRHAGE, UNSPECIFIED: ICD-10-CM

## 2023-03-01 PROCEDURE — 1101F PR PT FALLS ASSESS DOC 0-1 FALLS W/OUT INJ PAST YR: ICD-10-PCS | Mod: CPTII,S$GLB,, | Performed by: NEUROLOGICAL SURGERY

## 2023-03-01 PROCEDURE — 1159F PR MEDICATION LIST DOCUMENTED IN MEDICAL RECORD: ICD-10-PCS | Mod: CPTII,S$GLB,, | Performed by: NEUROLOGICAL SURGERY

## 2023-03-01 PROCEDURE — 99999 PR PBB SHADOW E&M-EST. PATIENT-LVL III: ICD-10-PCS | Mod: PBBFAC,,, | Performed by: NEUROLOGICAL SURGERY

## 2023-03-01 PROCEDURE — 1126F PR PAIN SEVERITY QUANTIFIED, NO PAIN PRESENT: ICD-10-PCS | Mod: CPTII,S$GLB,, | Performed by: NEUROLOGICAL SURGERY

## 2023-03-01 PROCEDURE — 70450 CT HEAD WITHOUT CONTRAST: ICD-10-PCS | Mod: 26,,, | Performed by: RADIOLOGY

## 2023-03-01 PROCEDURE — 99204 PR OFFICE/OUTPT VISIT, NEW, LEVL IV, 45-59 MIN: ICD-10-PCS | Mod: S$GLB,,, | Performed by: NEUROLOGICAL SURGERY

## 2023-03-01 PROCEDURE — 70450 CT HEAD/BRAIN W/O DYE: CPT | Mod: 26,,, | Performed by: RADIOLOGY

## 2023-03-01 PROCEDURE — 70450 CT HEAD/BRAIN W/O DYE: CPT | Mod: TC

## 2023-03-01 PROCEDURE — 99999 PR PBB SHADOW E&M-EST. PATIENT-LVL III: CPT | Mod: PBBFAC,,, | Performed by: NEUROLOGICAL SURGERY

## 2023-03-01 PROCEDURE — 1159F MED LIST DOCD IN RCRD: CPT | Mod: CPTII,S$GLB,, | Performed by: NEUROLOGICAL SURGERY

## 2023-03-01 PROCEDURE — 1126F AMNT PAIN NOTED NONE PRSNT: CPT | Mod: CPTII,S$GLB,, | Performed by: NEUROLOGICAL SURGERY

## 2023-03-01 PROCEDURE — 99204 OFFICE O/P NEW MOD 45 MIN: CPT | Mod: S$GLB,,, | Performed by: NEUROLOGICAL SURGERY

## 2023-03-01 PROCEDURE — 3288F PR FALLS RISK ASSESSMENT DOCUMENTED: ICD-10-PCS | Mod: CPTII,S$GLB,, | Performed by: NEUROLOGICAL SURGERY

## 2023-03-01 PROCEDURE — 3288F FALL RISK ASSESSMENT DOCD: CPT | Mod: CPTII,S$GLB,, | Performed by: NEUROLOGICAL SURGERY

## 2023-03-01 PROCEDURE — 1101F PT FALLS ASSESS-DOCD LE1/YR: CPT | Mod: CPTII,S$GLB,, | Performed by: NEUROLOGICAL SURGERY

## 2023-03-01 RX ORDER — OMEPRAZOLE 20 MG/1
20 CAPSULE, DELAYED RELEASE ORAL DAILY
COMMUNITY
End: 2023-09-06

## 2023-03-02 ENCOUNTER — TELEPHONE (OUTPATIENT)
Dept: NEUROSURGERY | Facility: CLINIC | Age: 83
End: 2023-03-02
Payer: MEDICARE

## 2023-03-02 NOTE — TELEPHONE ENCOUNTER
Called pt - no answer. Left VM informing patient of scheduled time and date with Tori for stitch removal after crani for SDH evac done in [place] on 2/20/23. I encouraged to reach back out through portal or call back with any questions/concerns

## 2023-03-06 ENCOUNTER — CLINICAL SUPPORT (OUTPATIENT)
Dept: NEUROSURGERY | Facility: CLINIC | Age: 83
End: 2023-03-06
Payer: MEDICARE

## 2023-03-06 VITALS — TEMPERATURE: 98 F

## 2023-03-06 NOTE — PROGRESS NOTES
Mrs. Rodriguez had a craniotomy for SDH in [place] on 02/20/2023. She saw DR Garcia last week and asked if we could remove the sutures. DR Garcia was happy to oblige.         incision assessed, nylon sutures used for closure, no redness, swelling, or drainage, edges well approximated.       Patient was instructed as follows:   Discontinue Bacitracin after tonight.  May shower normally but pat dry after shower.  Do not submerge wound in bath tub or go swimming until released by the physician  Keep incision clean, dry and open to air as much as possible.  Patient encouraged to walk as much as possible but advised to walk with family member or friend and rest as necessary.      A copy of post-operative instructions provided to the patient.    All questions were answered. Patient will follow up with Dr tomorrow for a VV to review CT. Patient was encouraged to call clinic with any future concerns prior to follow up appt. If any worsening symptoms, patient should report to ED.       Tori Zafar RN, BSN  Neurosurgery

## 2023-03-07 ENCOUNTER — OFFICE VISIT (OUTPATIENT)
Dept: NEUROSURGERY | Facility: CLINIC | Age: 83
End: 2023-03-07
Payer: MEDICARE

## 2023-03-07 DIAGNOSIS — S09.90XA HEAD TRAUMA, INITIAL ENCOUNTER: ICD-10-CM

## 2023-03-07 DIAGNOSIS — I62.00 NONTRAUMATIC SUBDURAL HEMORRHAGE, UNSPECIFIED: Primary | ICD-10-CM

## 2023-03-07 PROCEDURE — 99214 OFFICE O/P EST MOD 30 MIN: CPT | Mod: 95,,, | Performed by: NEUROLOGICAL SURGERY

## 2023-03-07 PROCEDURE — 99214 PR OFFICE/OUTPT VISIT, EST, LEVL IV, 30-39 MIN: ICD-10-PCS | Mod: 95,,, | Performed by: NEUROLOGICAL SURGERY

## 2023-03-07 NOTE — PROGRESS NOTES
Neurosurgery  Established Patient    SUBJECTIVE:     History of Present Illness:  Back to see me follow-up after last evaluation patient on 1 a 1003.  This is a 83-year-old female who recently diagnosed bilateral subacute subdural hematoma requiring a left frontal parietal craniotomy in [place].  She is since returned to the [place] and saw me  last week.  We only had a postop scan and wanted to get an interval scan for follow-up.  She denies any headaches denies any seizures denies any weakness or numbness in    Review of patient's allergies indicates:  No Known Allergies    Current Outpatient Medications   Medication Sig Dispense Refill    amLODIPine (NORVASC) 5 MG tablet Take 1 tablet (5 mg total) by mouth once daily. (Patient taking differently: Take 10 mg by mouth once daily.) 90 tablet 3    augmented betamethasone (DIPROLENE) 0.05 % gel Apply topically to the affected area 4 to 5 times a day while having symptoms 50 g 0    benzonatate (TESSALON PERLES) 100 MG capsule Take 1 capsule (100 mg total) by mouth every 6 (six) hours as needed for Cough. 30 capsule 1    blood sugar diagnostic (CONTOUR TEST STRIPS) Strp Check blood sugar twice weekly plus as directed by  each 6    celecoxib (CELEBREX) 100 MG capsule take 1 Capsule by mouth  twice daily as needed (Patient not taking: Reported on 3/1/2023) 180 capsule 1    cevimeline (EVOXAC) 30 mg capsule Take 1 capsule (30 mg total) by mouth 3 (three) times daily. 90 capsule 11    flu vac 2021 65up-[id number],PF, (FLUAD QUAD 2021-22,65Y UP,,PF,) 60 mcg (15 mcg x 4)/0.5 mL Syrg inject by [facility] 0.5 mL 0    glipiZIDE (GLUCOTROL) 5 MG tablet Take 1 tablet (5 mg total) by mouth 2 (two) times daily. 180 tablet 1    glipiZIDE (GLUCOTROL) 5 MG tablet Take 1 tablet (5 mg total) by mouth 2 (two) times daily. 180 tablet 1    levothyroxine (SYNTHROID) 25 MCG tablet Take 1 tablet (25 mcg total) by mouth before breakfast. 30 tablet 5    omeprazole (PRILOSEC) 20 MG capsule Take 20 mg by  mouth once daily.      omeprazole (PRILOSEC) 20 MG capsule Take 1 capsule by mouth daily 90 capsule 3    promethazine-dextromethorphan (PROMETHAZINE-DM) 6.25-15 mg/5 mL Syrp Take 5 mLs by mouth nightly as needed (cough). 180 mL 0    semaglutide (RYBELSUS) 3 mg tablet Take 1 tablet (3 mg total) by mouth once daily. 90 tablet 4    triamcinolone (NASACORT) 55 mcg nasal inhaler 2 sprays by Nasal route once daily. (Patient not taking: Reported on 3/1/2023) 10.8 g 0     Current Facility-Administered Medications   Medication Dose Route Frequency Provider Last Rate Last Admin    sodium chloride 0.9% flush 10 mL  10 mL Intravenous PRN Tami Zarate MD           Past Medical History:   Diagnosis Date    Allergy     Arthritis     Cataract     Diabetes mellitus, type 2     Hypertension     Thyroid disease      Past Surgical History:   Procedure Laterality Date    CATARACT EXTRACTION W/  INTRAOCULAR LENS IMPLANT Right 5/16/2022    Procedure: EXTRACTION, CATARACT, WITH IOL INSERTION;  Surgeon: Tami Zarate MD;  Location: [facility] OR;  Service: Ophthalmology;  Laterality: Right;  ORA ONLY FOR CYL MEASUREMENT/TORIC DECISION    CATARACT EXTRACTION W/  INTRAOCULAR LENS IMPLANT Left 6/20/2022    Procedure: EXTRACTION, CATARACT, WITH IOL INSERTION;  Surgeon: Tami Zarate MD;  Location: [facility] OR;  Service: Ophthalmology;  Laterality: Left;  ORA ONLY FOR CYL MEASUREMENT/TORIC DECISION    HEMORRHOID SURGERY       Family History    None       Social History     Socioeconomic History    Marital status:    Tobacco Use    Smoking status: Former    Smokeless tobacco: Never   Substance and Sexual Activity    Alcohol use: No     Alcohol/week: 0.0 standard drinks    Drug use: No       Review of Systems    OBJECTIVE:     Vital Signs     There is no height or weight on file to calculate BMI.    Neurosurgery Physical Exam      Diagnostic Results:  EXAMINATION:  CT HEAD WITHOUT CONTRAST     CLINICAL HISTORY:  Subdural hemorrhage; Nontraumatic  subdural hemorrhage, unspecified     TECHNIQUE:  Low dose axial CT images obtained throughout the head without intravenous contrast. Sagittal and coronal reconstructions were performed.     COMPARISON:  None.     FINDINGS:  There is been leslie hole and craniotomy over the left frontal parietal region with subdural air bubbles and subdural hematoma on the left and a small on the right.  No mass effect.  No loss of gray-white matter junction differentiation, intraparenchymal hemorrhage, hydrocephalus or herniation.  The remainder of the calvarium and air cells are intact in clear respectively.  The orbits and orbital contents appear unremarkable allowing for bilateral ocular lens replacement.     Impression:     Bilateral subdural hematomas appearing subacute with postoperative craniotomy and leslie hole changes on the left.     No mass effect, intraparenchymal hemorrhage or herniation.       ASSESSMENT/PLAN:      Overall the patient is clinically doing well.  The right-sided subdural looks little small the left residual still present maybe little bigger than immediately postop but nothing I am very worried about this stage.  We will continue to follow patient clinically we will get her sutures out we would a repeat CT scan in 2 months I think she would benefit from outpatient   Physical and occupational therapy.      Note dictated with voice recognition software, please excuse any grammatical errors.

## 2023-03-07 NOTE — PROGRESS NOTES
Neurosurgery  History & Physical    SUBJECTIVE:     Chief Complaint:  patient was referred to us for recent subdural hematoma and craniotomy in [place].    History of Present Illness:    This is a 83-year-old female who began having progressive hemiparesis while away to vacation in [place].  With the landed in [place] she was seen and evaluated found to have bilateral subacute upon chronic subdural hematoma.   She was more symptomatic from the hematoma and was larger patient was taken on 02/20/2023 were craniotomy and subdural evacuation in [place].  She had a postop subdural drain.  Patient well from this and was discharged home and returned to [facility] on 2/27.  Since vagal she would done pretty well she still has her sutures in place.  The main issues her gait is not been back to normal yet.    Review of patient's allergies indicates:  No Known Allergies    Current Outpatient Medications   Medication Sig Dispense Refill    amLODIPine (NORVASC) 5 MG tablet Take 1 tablet (5 mg total) by mouth once daily. (Patient taking differently: Take 10 mg by mouth once daily.) 90 tablet 3    augmented betamethasone (DIPROLENE) 0.05 % gel Apply topically to the affected area 4 to 5 times a day while having symptoms 50 g 0    blood sugar diagnostic (CONTOUR TEST STRIPS) Strp Check blood sugar twice weekly plus as directed by  each 6    cevimeline (EVOXAC) 30 mg capsule Take 1 capsule (30 mg total) by mouth 3 (three) times daily. 90 capsule 11    glipiZIDE (GLUCOTROL) 5 MG tablet Take 1 tablet (5 mg total) by mouth 2 (two) times daily. 180 tablet 1    levothyroxine (SYNTHROID) 25 MCG tablet Take 1 tablet (25 mcg total) by mouth before breakfast. 30 tablet 5    omeprazole (PRILOSEC) 20 MG capsule Take 20 mg by mouth once daily.      semaglutide (RYBELSUS) 3 mg tablet Take 1 tablet (3 mg total) by mouth once daily. 90 tablet 4    benzonatate (TESSALON PERLES) 100 MG capsule Take 1 capsule (100 mg total) by mouth every 6 (six) hours  as needed for Cough. 30 capsule 1    celecoxib (CELEBREX) 100 MG capsule take 1 Capsule by mouth  twice daily as needed (Patient not taking: Reported on 3/1/2023) 180 capsule 1    flu vac 2021 [id number],PF, (FLUAD QUAD 2021-22,65Y UP,,PF,) 60 mcg (15 mcg x 4)/0.5 mL Syrg inject by [facility] 0.5 mL 0    glipiZIDE (GLUCOTROL) 5 MG tablet Take 1 tablet (5 mg total) by mouth 2 (two) times daily. 180 tablet 1    omeprazole (PRILOSEC) 20 MG capsule Take 1 capsule by mouth daily 90 capsule 3    promethazine-dextromethorphan (PROMETHAZINE-DM) 6.25-15 mg/5 mL Syrp Take 5 mLs by mouth nightly as needed (cough). 180 mL 0    triamcinolone (NASACORT) 55 mcg nasal inhaler 2 sprays by Nasal route once daily. (Patient not taking: Reported on 3/1/2023) 10.8 g 0     Current Facility-Administered Medications   Medication Dose Route Frequency Provider Last Rate Last Admin    sodium chloride 0.9% flush 10 mL  10 mL Intravenous PRN Tami Zarate MD           Past Medical History:   Diagnosis Date    Allergy     Arthritis     Cataract     Diabetes mellitus, type 2     Hypertension     Thyroid disease      Past Surgical History:   Procedure Laterality Date    CATARACT EXTRACTION W/  INTRAOCULAR LENS IMPLANT Right 5/16/2022    Procedure: EXTRACTION, CATARACT, WITH IOL INSERTION;  Surgeon: Tami Zarate MD;  Location: [facility] OR;  Service: Ophthalmology;  Laterality: Right;  ORA ONLY FOR CYL MEASUREMENT/TORIC DECISION    CATARACT EXTRACTION W/  INTRAOCULAR LENS IMPLANT Left 6/20/2022    Procedure: EXTRACTION, CATARACT, WITH IOL INSERTION;  Surgeon: Tami Zarate MD;  Location: [facility] OR;  Service: Ophthalmology;  Laterality: Left;  ORA ONLY FOR CYL MEASUREMENT/TORIC DECISION    HEMORRHOID SURGERY       Family History    None       Social History     Socioeconomic History    Marital status:    Tobacco Use    Smoking status: Former    Smokeless tobacco: Never   Substance and Sexual Activity    Alcohol use: No     Alcohol/week: 0.0 standard  drinks    Drug use: No       Review of Systems    OBJECTIVE:     Vital Signs  Pain Score: 0-No pain  There is no height or weight on file to calculate BMI.      Neurosurgery Physical Exam      Patient is awake alert appropriate   Cranial nerves are intact   No drift no lag   Still has unsteady gait   Her incision still has sutures in place still has some scabbing over it    Diagnostic Results:   I reviewed her immediate postop CT scan but do not have a CT scan after the drains were pulled.    ASSESSMENT/PLAN:       Overall I think patient has done well from the surgery.  We will give a few more days we pulled the sutures.  I would like to get him into physical and occupational therapy.  I would like to get a follow-up CT scan next day or so to  have a post drain-pull scan.        Note dictated with voice recognition software, please excuse any grammatical errors.

## 2023-03-20 ENCOUNTER — TELEPHONE (OUTPATIENT)
Dept: NEUROSURGERY | Facility: CLINIC | Age: 83
End: 2023-03-20
Payer: MEDICARE

## 2023-03-20 NOTE — TELEPHONE ENCOUNTER
Spoke to patient and her spouse. We confirmed location, times, and date for CT and follow up appointment with Maribel.

## 2023-03-21 ENCOUNTER — CLINICAL SUPPORT (OUTPATIENT)
Dept: REHABILITATION | Facility: HOSPITAL | Age: 83
End: 2023-03-21
Attending: NEUROLOGICAL SURGERY
Payer: MEDICARE

## 2023-03-21 DIAGNOSIS — F40.298 FEAR OF FALLING: ICD-10-CM

## 2023-03-21 DIAGNOSIS — R29.898 WEAKNESS OF BOTH HIPS: ICD-10-CM

## 2023-03-21 PROCEDURE — 97162 PT EVAL MOD COMPLEX 30 MIN: CPT | Mod: PN

## 2023-03-21 PROCEDURE — 97110 THERAPEUTIC EXERCISES: CPT | Mod: PN

## 2023-03-21 NOTE — PATIENT INSTRUCTIONS
Please walk in safe environments without treacherous ground and walk with a family member if you have balance or CV concerns.  Walk when the weather permits at the cool hours of the day in the summer and during rainy seasons.  PT recommends a HR monitor or pedometer to track levels of fitness daily and weekly.  Please do not exceed heart rate max when walking.  See AHA for Heart rate range recommendations for someone your age.

## 2023-03-21 NOTE — PLAN OF CARE
[facility]  Physical Therapy Neurological Rehabilitation Initial Evaluation    Name: Ning Rodriguez  Clinic Number: [id number]    Therapy Diagnosis:   Encounter Diagnoses   Name Primary?    Fear of falling     Weakness of both hips      Physician: Arben Garcia MD    Physician Orders: PT Eval and Treat   Medical Diagnosis from Referral: I62.00 (ICD-10-CM) - Nontraumatic intracranial subdural hemorrhage S09.90XA (ICD-10-CM) - Head trauma, initial encounter   Evaluation Date: 3/21/2023  Authorization Period Expiration: 04/18/2023   Plan of Care Expiration: 5/5/2023  Visit # / Visits authorized: 1/ 1    Time In: 11:15  Time Out: 11:55  Total Billable Time: 50 minutes (1 LCE, 1 TE)    Precautions: Standard and Fall    Subjective   Date of onset: December 2022  History of current condition - Ning reports: s/p crani for SDH evac done in [place] on 2/20/23.  Pt fell in December of 2022, hitting head on sidewalk.  Upon arriving in [place], where she and her  were vacationing, patient had onset of right sided hemiparesis.  Pt feeling good but does have left hip pain and expresses fear of falling, limiting her community and recreational activities.      Medical History:   Past Medical History:   Diagnosis Date    Allergy     Arthritis     Cataract     Diabetes mellitus, type 2     Hypertension     Thyroid disease        Surgical History:   Ning Rodriguez  has a past surgical history that includes Hemorrhoid surgery; Cataract extraction w/  intraocular lens implant (Right, 5/16/2022); and Cataract extraction w/  intraocular lens implant (Left, 6/20/2022).    Medications:   Ning has a current medication list which includes the following prescription(s): amlodipine, augmented betamethasone, benzonatate, blood sugar diagnostic, celecoxib, cevimeline, fluad quad 2021-22(65y up)(pf), glipizide, glipizide, levothyroxine, omeprazole, omeprazole, promethazine-dextromethorphan, rybelsus,  triamcinolone, [DISCONTINUED] lisinopril, and [DISCONTINUED] metformin, and the following Facility-Administered Medications: sodium chloride 0.9%.    Allergies:   Review of patient's allergies indicates:  No Known Allergies     Imaging, CT scan films:   EXAMINATION:  CT HEAD WITHOUT CONTRAST     CLINICAL HISTORY:  Subdural hemorrhage; Nontraumatic subdural hemorrhage, unspecified     TECHNIQUE:  Low dose axial CT images obtained throughout the head without intravenous contrast. Sagittal and coronal reconstructions were performed.     COMPARISON:  None.     FINDINGS:  There is been leslie hole and craniotomy over the left frontal parietal region with subdural air bubbles and subdural hematoma on the left and a small on the right.  No mass effect.  No loss of gray-white matter junction differentiation, intraparenchymal hemorrhage, hydrocephalus or herniation.  The remainder of the calvarium and air cells are intact in clear respectively.  The orbits and orbital contents appear unremarkable allowing for bilateral ocular lens replacement.     Impression:     Bilateral subdural hematomas appearing subacute with postoperative craniotomy and leslie hole changes on the left.     No mass effect, intraparenchymal hemorrhage or herniation.    Prior Therapy: no  Social History:  lives with their spouse  Falls: one, December 2022   DME: none    Home Environment: single story home, one step to enter   Exercise Routine / History: none   Family Present at time of Eval:    Occupation: retired  Prior Level of Function: independent  Current Level of Function: independent, not as active in community or with recreational activities.     Pain:  Current 6/10, worst 6/10, best 0/10   Location: left posterior hip and buttocks   Description: Aching, Throbbing, and Grabbing  Aggravating Factors: Walking  Easing Factors: rest    Patient's goals: Improve endurance, return to previous level of activity without fear of falling.  Reduce left  hip pain    Objective     - Command followin% simple and complex   - Speech: no deficits     Mental status: alert, oriented to person, place, and time  Behavior:  calm and cooperative  Attention Span and Concentration:  Normal    Dominant hand:  right     Posture Alignment : mildly slouched posture    Sensation:  Light Touch: Intact           Proprioception:   Intact    Tone: 0 - No increase in muscle tone  Limbs/muscles affected: N/A    Visual/Auditory: denies changes     ROM:   UPPER EXTREMITY--AROM/PROM  (R) UE: WFLs  (L) UE: WFLs           RANGE OF MOTION--LOWER EXTREMITIES  (R) LE Hip: full   Knee: full   Ankle: WFL    (L) LE: Hip: WFL, pain with internal  and external rotation   Knee: full   Ankle: WFL    90/90 Hamstring test- right: 17 degrees  left: 26 degrees    Upper Extremity Strength   RUE LUE   Shoulder Flexion: 4+/5 4+/5   Shoulder Abduction: 4+/5 4+/5   Elbow Flexion:     5/5 5/5   Elbow Extension: 5/5 5/5   : 4/5 4/5     Lower Extremity Strength   RLE LLE   Hip Flexion: 4-/5 3+/5   Hip Extension:  4-/5 4-/5   Hip Abduction: 4-/5 3/5   Hip Adduction: 4/5 4-/5   Knee Extension: 4+/5 4+/5   Knee Flexion: 4+/5 4+/5   Ankle Dorsiflexion: 4+/5 4+/5   Ankle Plantarflexion: 4+/5 (NWB) 4+/5 (NWB)       Gait Assessment:   - AD used: none  - Assistance: independent  - Distance: > 400 feet during evaluation  - Stairs: I    Gait Analysis:  Deviations noted: mildly antalgic gait with prolonged ambulation    Impairments contributing to deviations:  left hip pain, proximal lower extremity weakness    Endurance Deficit: assess 6 minute walk test next session          Evaluation   30 second Chair Rise  (adults > 59 y/o) 11 completed with arms      Evaluation   Timed Up and Go 8.5 sec  < 20 sec safe for independent transfers, < 30 sec safe for dependent transfers/assist required   Self Selected Walking Speed 0.93 m/sec (6m/6.47s)   Fast Walking Speed 1.35 m/sec (6m/4.42s)       Balance Assessment:    Sitting  balance (static,dynamic):Normal  Standing balance (static, dynamic):good/good minus    Functional Gait Assessment:     *note: Measure a distance of 20 feet (~6 meters) for this assessment    1. Gait on level surface =  3   (3) Normal: less than 5.5 sec, no A.D., no imbalance, normal gait pattern, deviates <6in   (2) Mild impairment: 7-5.6 sec, uses A.D., mild gait deviations, or deviates 6-10 in   (1) Moderate impairment: > 7 sec, slow speed, imbalance, deviates 10-15 in.   (0) Severe impairment: needs assist, deviates >15 in, reach/touch wall  2. Change in Gait Speed = 3   (3) Normal: smooth change w/o loss of balance or gait deviation, deviates < 6 in, significant difference between speeds   (2) Mild impairment: changes speed, but demonstrates mild gait deviations, deviates 6-10 in, OR no deviations but unable to significantly speed, OR uses A.D.   (1) Moderate impairment: minor changes to speed, OR changes speed w/ significant deviations, deviates 10-15 in, OR  Changes speed , but loses balance & recovers   (0) Severe impairment: cannot change speed, deviates >15 in, or loses balance & needs assist  3. Gait with horizontal head turns  = 2   (3) Normal: no change in gait, deviates <6 in   (2) Mild impairment: slight change in speed, deviates 6-10 in, OR uses A.D.   (1) Moderate impairment: moderate change in speed, deviates 10-15 in   (0) Severe impairment: severe disruption of gait, deviates >15in  4. Gait with vertical head turns = 2   (3) Normal: no change in gait, deviates <6 in   (2) Mild impairment: slight change in speed, deviates 6-10 in OR uses A.D.   (1) Moderate impairment: moderate change in speed, deviates 10-15 in   (0) Severe impairment: severe disruption of gait, deviates >15 in  5. Gait with pivot turns = 3   (3) Normal: performs safely in 3 sec, no LOB   (2) Mild impairment: performs in >3 sec & no LOB, OR turns safely & requires several steps to regain LOB   (1) Moderate impairment: turns slow,  OR requires several small steps for balance following turn & stop   (0) Severe impairment: cannot turn safely, needs assist  6. Step over obstacle = 2   (3) Normal: steps over 2 stacked boxes w/o change in speed or LOB   (2) Mild impairment: able to step over 1 box w/o change in speed or LOB   (1) Moderate impairment: steps over 1 box but must slow down, may require VC   (0) Severe impairment: cannot perform w/o assist  7. Gait with Narrow LUIZ = 2   (3) Normal: 10 steps no staggering   (2) Mild impairment: 7-9 steps   (1) Moderate impairment: 4-7 steps   (0) Severe impairment: < 4 steps or cannot perform w/o assist  8. Gait with eyes closed = 2   (3) Normal: < 7 sec, no A.D., no LOB, normal gait pattern, deviates <6 in   (2) Mild impairment: 7.1-9 sec, mild gait deviations, deviates 6-10 in   (1) Moderate impairment: > 9 sec, abnormal pattern, LOB, deviates 10-15 in   (0) Severe impairment: cannot perform w/o assist, LOB, deviates >15in  9. Ambulating Backwards = 3   (3) Normal: no A.D., no LOB, normal gait pattern, deviates <6in   (2) Mild impairment: uses A.D., slower speed, mild gait deviations, deviates 6-10 in   (1) Moderate impairment: slow speed, abnormal gait pattern, LOB, deviates 10-15 in   (0) Severe impairment: severe gait deviations or LOB, deviates >15in  10. Steps = 2   (3) Normal: alternating feet, no rail   (2) Mild Impairment: alternating feet, uses rail   (1) Moderate impairment: step-to, uses rail   (0) Severe impairment: cannot perform safely    Score 24/30     Cutoffs:   <22/30 fall risk in older adults  <18/30 fall risk in Parkinsons    MDC/MCID:  Stroke = 4.2 points (MDC)  Vestibular = 6 points (MDC)  Geriatric = 4 points (MCID)  Parkinson's = 4 points (MDC)     Functional Mobility (Bed mobility, transfers)  Bed mobility: I  Supine to sit: I  Sit to supine: I  Rolling: I  Transfers to bed: I  Sit to stand:  I  Stand pivot:  I  Stairs: I      CMS Impairment/Limitation/Restriction for FOTO  "Brain Injury Survey    Therapist reviewed FOTO scores for Ning Rodriguez on 3/21/2023.   FOTO documents entered into [employer] - see Media section.    Limitation Score: 36%  Projected Limitation 25% in          TREATMENT   Treatment Time In: 11:45  Treatment Time Out: 11:55  Total Treatment time separate from Evaluation: 10 minutes    Ning received therapeutic exercises to develop strength and flexibility for 10 minutes including:  - seated hamstring stretch 30" x 3 bilateral lower extremity   - supine straight leg raise x 10 reps bilaterally  - sidelying hip abduction x 10 reps bilaterally    Home Exercises and Patient Education Provided    Education provided:   - role of PT  - PT goals and plan of care  - home exercise program    Written Home Exercises Provided: yes.  Exercises were reviewed and Ning was able to demonstrate them prior to the end of the session.  Ning demonstrated good  understanding of the education provided.     See EMR under Patient Instructions for exercises provided 3/21/2023.    Assessment   Ning is a 83 y.o. female referred to outpatient Physical Therapy with a medical diagnosis of I62.00 (ICD-10-CM) - Nontraumatic intracranial subdural hemorrhage S09.90XA (ICD-10-CM) - Head trauma, initial encounter. Patient presents with left hip pain, proximal bilateral lower extremity strength deficits (left>right), mild dynamic balance deficits and fear of falling which is limiting patient's ability to return to previous level of independence.     Patient prognosis is Excellent.   Patient will benefit from skilled outpatient Physical Therapy to address the deficits stated above and in the chart below, provide patient/family education, and to maximize patient's level of independence.     Plan of care discussed with patient: Yes  Patient's spiritual, cultural and educational needs considered and patient is agreeable to the plan of care and goals as stated below:     Anticipated Barriers for " therapy: none noted at this time.    Medical Necessity is demonstrated by the following  History  Co-morbidities and personal factors that may impact the plan of care Co-morbidities:   Arthritis, Diabetes Type I or II, High Blood Pressure, Sleep dysfunction, Visual Impairments    Personal Factors:   coping style     moderate   Examination  Body Structures and Functions, activity limitations and participation restrictions that may impact the plan of care Body Regions:   lower extremities  trunk    Body Systems:    gross symmetry  ROM  strength  gross coordinated movement  balance  gait  transfers  transitions  motor control    Participation Restrictions:   Community activity    Activity limitations:   Learning and applying knowledge  no deficits    General Tasks and Commands  no deficits    Communication  no deficits    Mobility  lifting and carrying objects    Self care  no deficits    Domestic Life  doing house work (cleaning house, washing dishes, laundry)    Interactions/Relationships  no deficits    Life Areas  no deficits    Community and Social Life  community life  recreation and leisure         moderate   Clinical Presentation stable and uncomplicated moderate   Decision Making/ Complexity Score: moderate     Goals:  Short Term Goals: 4 weeks   Pt will be compliant with HEP in order to maximize PT benefits  Pt will improve BLE MMT grades by >/=1/2 grade in order to improve strength for ADL completion  Pt will complete TUG in </= 7 seconds with least restrictive assistive device in order to reduce risk for falls and improve safety with functional mobility  Pt will score >/= 27/30 on Functional Gait Assessment without assistive device in order to reduce risk for falls and improve postural control  Pt will score >/= 13 repetitions on 30-Second Sit to  order to improve BLE endurance and muscular power for transfers.    Long Term Goals: 8 weeks   Pt will score </= 25% on FOTO limitation survey in order to  improve self-perception of functional mobility deficits  Pt will improve BLE MMT grades by >/=1 grade in order to improve strength for ADL completion  Pt will score >/= 29/30 on Functional Gait Assessment with least restrictive assistive device in order to reduce risk for falls and improve postural control  Pt will score >/= 15 repetitions on 30-Second Sit to  order to improve BLE endurance and muscular power for transfers   Pt will walk >/= 1200 feet on Six-Minute Walk Test outdoors over multiple levels/surfaces in order to improve endurance for community mobility   Pt will perform 1 floor <> stand transfer with single UE support in order to demonstrate safe functional mobility in case of future fall  Pt will report 0 falls from initiation of PT management  Pt will begin some form of home/community fitness in order to sustain progress gained in PT    Plan   Plan of care Certification: 3/21/2023 to 5/17/2023.    Outpatient Physical Therapy 2 times weekly for 8 weeks to include the following interventions: Gait Training, Manual Therapy, Moist Heat/ Ice, Neuromuscular Re-ed, Patient Education, Self Care, Therapeutic Activities, and Therapeutic Exercise.     Stephanie Astorga, PT

## 2023-03-28 ENCOUNTER — CLINICAL SUPPORT (OUTPATIENT)
Dept: REHABILITATION | Facility: HOSPITAL | Age: 83
End: 2023-03-28
Payer: MEDICARE

## 2023-03-28 DIAGNOSIS — R29.898 WEAKNESS OF BOTH HIPS: ICD-10-CM

## 2023-03-28 DIAGNOSIS — F40.298 FEAR OF FALLING: Primary | ICD-10-CM

## 2023-03-28 PROCEDURE — 97110 THERAPEUTIC EXERCISES: CPT | Mod: PN,CQ

## 2023-03-28 PROCEDURE — 97530 THERAPEUTIC ACTIVITIES: CPT | Mod: PN,CQ

## 2023-03-28 PROCEDURE — 97112 NEUROMUSCULAR REEDUCATION: CPT | Mod: PN,CQ

## 2023-03-28 NOTE — PROGRESS NOTES
" [facility]   Physical Therapy Treatment Note     Name: Ning Alexanderibe  Clinic Number: [id number]    Therapy Diagnosis:   Encounter Diagnoses   Name Primary?    Fear of falling Yes    Weakness of both hips      Physician: Arben Garcia MD    Visit Date: 3/28/2023    Physician Orders: PT Eval and Treat   Medical Diagnosis from Referral: I62.00 (ICD-10-CM) - Nontraumatic intracranial subdural hemorrhage S09.90XA (ICD-10-CM) - Head trauma, initial encounter   Evaluation Date: 3/21/2023  Authorization Period Expiration: 2023   Plan of Care Expiration: 2023  Visit # / Visits authorized:  (+ eval)  FOTO #:   PTA Visit #:      Time In: 2:30 pm  Time Out: 3:15 pm  Total Billable Time: 45 minutes (1 TE + 1 NMR + 1 TA)    Precautions: Standard and Fall    SUBJECTIVE     Pt reports: complaints of L hip pain that she describes as more of an ache than a pain.  Patient motions to left lateral leg from hip to knee when discussing.  She was compliant with home exercise program.  Response to previous treatment: initial eval  Functional change: none    Pain: 0/10  Location: right knee      OBJECTIVE     Objective Measures updated at progress report unless specified.     Six minute walk test: 1300 feet     Treatment     Ning received the treatments listed below:      therapeutic exercises to develop strength, endurance, flexibility, and posture for 20 minutes including:  Nustep level 3.0 progressive hills 8 minutes  Hamstring stretch at stairs: 2x30" B  Hip flexor stetch at stairs: 2x30" B  Heel/Toe Raises: 2x10   Standing hip 3 way with yellow theraband: x15 ea   6 minute walk test - see objective    neuromuscular re-education activities to improve: Balance, Coordination, Kinesthetic, and Proprioception for 15 minutes. The following activities were included:  Forward/Backward crossovers: 6 lengths x 10 feet  Tandem walkin lengths x 10 feet  Backwards walkin lengths x 10 " "feet  Hurdles: 6" inches , forward/lateral 6 lengths x 10 feet ea way    therapeutic activities to improve functional performance for 10 minutes, including:  Sit to stand from standard chair: 2x10   Step ups: forward x 10 B                : lateral 2x10  Forward lunges: x10 B      Patient Education and Home Exercises     Home Exercises Provided and Patient Education Provided     Education provided:   - daily HEP compliance    Written Home Exercises Provided: Patient instructed to cont prior HEP. Exercises were reviewed and Ning was able to demonstrate them prior to the end of the session.  Ning demonstrated good  understanding of the education provided. See EMR under Patient Instructions for exercises provided during therapy sessions    ASSESSMENT     Ning arrived to session with complaints of mild L lateral leg soreness but was agreeable to treatment.  Good tolerance of first follow up session after initial evaluation.  Session focused on functional strengthening and dynamic balance training.  Patient appears most limited by fear of falling and required encouragement to limit UE support to maximize therapeutic benefit of balance training.  Good endurance with all standing activity with only one brief seated rest break for fatigue recovery and water required after six minute walk test.  Patient completed 1300 feet during six minute walk test but was noticeably limping towards the end which she attributed to L lateral leg pain she described as "aching but tolerable."  She would possibly benefit from manual therapy to address any soft tissue restrictions to proximal lateral leg musculature. .          Ning Is progressing well towards her goals.   Pt prognosis is Excellent.     Pt will continue to benefit from skilled outpatient physical therapy to address the deficits listed in the problem list box on initial evaluation, provide pt/family education and to maximize pt's level of independence in the home and " community environment.     Pt's spiritual, cultural and educational needs considered and pt agreeable to plan of care and goals.     Anticipated barriers to physical therapy: none noted at this time    Goals:   Short Term Goals: 4 weeks   Pt will be compliant with HEP in order to maximize PT benefits  Pt will improve BLE MMT grades by >/=1/2 grade in order to improve strength for ADL completion  Pt will complete TUG in </= 7 seconds with least restrictive assistive device in order to reduce risk for falls and improve safety with functional mobility  Pt will score >/= 27/30 on Functional Gait Assessment without assistive device in order to reduce risk for falls and improve postural control  Pt will score >/= 13 repetitions on 30-Second Sit to  order to improve BLE endurance and muscular power for transfers.     Long Term Goals: 8 weeks   Pt will score </= 25% on FOTO limitation survey in order to improve self-perception of functional mobility deficits  Pt will improve BLE MMT grades by >/=1 grade in order to improve strength for ADL completion  Pt will score >/= 29/30 on Functional Gait Assessment with least restrictive assistive device in order to reduce risk for falls and improve postural control  Pt will score >/= 15 repetitions on 30-Second Sit to  order to improve BLE endurance and muscular power for transfers   Pt will walk >/= 1200 feet on Six-Minute Walk Test outdoors over multiple levels/surfaces in order to improve endurance for community mobility   Pt will perform 1 floor <> stand transfer with single UE support in order to demonstrate safe functional mobility in case of future fall  Pt will report 0 falls from initiation of PT management  Pt will begin some form of home/community fitness in order to sustain progress gained in PT    PLAN     Plan to cont with progression of PT goals per POC.    Ernestine Gonsalves, PTA

## 2023-03-30 ENCOUNTER — CLINICAL SUPPORT (OUTPATIENT)
Dept: REHABILITATION | Facility: HOSPITAL | Age: 83
End: 2023-03-30
Payer: MEDICARE

## 2023-03-30 DIAGNOSIS — F40.298 FEAR OF FALLING: Primary | ICD-10-CM

## 2023-03-30 DIAGNOSIS — R29.898 WEAKNESS OF BOTH HIPS: ICD-10-CM

## 2023-03-30 PROCEDURE — 97110 THERAPEUTIC EXERCISES: CPT | Mod: PN

## 2023-03-30 PROCEDURE — 97112 NEUROMUSCULAR REEDUCATION: CPT | Mod: PN

## 2023-03-30 NOTE — PROGRESS NOTES
"  [facility]   Physical Therapy Treatment Note     Name: Ning Alexanderibe  Clinic Number: [id number]    Therapy Diagnosis:   Encounter Diagnoses   Name Primary?    Fear of falling Yes    Weakness of both hips      Physician: Arben Garcia MD    Visit Date: 3/30/2023    Physician Orders: PT Eval and Treat   Medical Diagnosis from Referral: I62.00 (ICD-10-CM) - Nontraumatic intracranial subdural hemorrhage S09.90XA (ICD-10-CM) - Head trauma, initial encounter   Evaluation Date: 3/21/2023  Authorization Period Expiration: 04/18/2023   Plan of Care Expiration: 5/5/2023  Visit # / Visits authorized: 2/11 (+ eval)  FOTO #: 1/5  PTA Visit #: 0/5     Time In: 3:25 pm (pt late)  Time Out: 4:05 pm  Total Billable Time: 40 minutes (1 TE + 1 NMR + 1 TA)    Precautions: Standard and Fall    SUBJECTIVE     Pt reports: complaints of L hip pain that she describes as more of an ache than a pain, especially when first standing up after sitting for prolonged period of time.  Patient motions to left buttocks, wrapping around to lateral leg from hip to knee when discussing.  She was compliant with home exercise program.  Response to previous treatment: initial eval  Functional change: none    Pain: 0/10  Location: right knee      OBJECTIVE     Objective Measures updated at progress report unless specified.       Treatment     Ning received the treatments listed below:      therapeutic exercises to develop strength, endurance, flexibility, and posture for 25 minutes including:  Nustep level 3.0 progressive hills 8 minutes  Hamstring stretch at stairs: 2x30" B  Hip flexor stetch at stairs: 2x30" B  Seated piriformis stretch 30" x 3  Heel/Toe Raises: 2x10   - hooklying pelvic tilts 2 x 10 reps with 3" hold  - muscle energy technique for left posterior/right anterior rotation    Not performed:  Standing hip 3 way with yellow theraband: x15 ea       neuromuscular re-education activities to improve: Balance, " "Coordination, Kinesthetic, and Proprioception for 15 minutes. The following activities were included:  High march with opposite knee tap  Tandem walkin lengths x 10 feet  Backwards walkin lengths x 10 feet    Not performed:  Hurdles: 6" inches , forward/lateral 6 lengths x 10 feet ea way  Forward/Backward crossovers: 6 lengths x 10 feet    therapeutic activities to improve functional performance for 0 minutes, including:  Not today:  Sit to stand from standard chair: 2x10   Step ups: forward x 10 B                : lateral 2x10  Forward lunges: x10 B      Patient Education and Home Exercises     Home Exercises Provided and Patient Education Provided     Education provided:   - daily HEP compliance    Written Home Exercises Provided: Patient instructed to cont prior HEP. Exercises were reviewed and Ning was able to demonstrate them prior to the end of the session.  Ning demonstrated good  understanding of the education provided. See EMR under Patient Instructions for exercises provided during therapy sessions    ASSESSMENT     Ning arrived to session with complaints of mild L lateral leg soreness but was agreeable to treatment.  Pain increased after seated piriformis stretch. Supine to long sit performed to check for pelvic obliquity and left posterior/right anterior torsion noted and addressed/resolved with muscle energy technique.  Pt instructed on how to perform at home when sharp/stabbing pain noted in left posterior hip.  Initiated core stabilization with pelvic tilts and transverse abdominal activation this session. Pt tolerated balance activity well, no loss of balance noted and patient was able to peform with minimal upper extremity support.     Ning Is progressing well towards her goals.   Pt prognosis is Excellent.     Pt will continue to benefit from skilled outpatient physical therapy to address the deficits listed in the problem list box on initial evaluation, provide pt/family education " and to maximize pt's level of independence in the home and community environment.     Pt's spiritual, cultural and educational needs considered and pt agreeable to plan of care and goals.     Anticipated barriers to physical therapy: none noted at this time    Goals:   Short Term Goals: 4 weeks   Pt will be compliant with HEP in order to maximize PT benefits. (PROGRESSING, NOT MET)   Pt will improve BLE MMT grades by >/=1/2 grade in order to improve strength for ADL completion. (PROGRESSING, NOT MET)   Pt will complete TUG in </= 7 seconds with least restrictive assistive device in order to reduce risk for falls and improve safety with functional mobility. (PROGRESSING, NOT MET)   Pt will score >/= 27/30 on Functional Gait Assessment without assistive device in order to reduce risk for falls and improve postural control. (PROGRESSING, NOT MET)   Pt will score >/= 13 repetitions on 30-Second Sit to  order to improve BLE endurance and muscular power for transfers. (PROGRESSING, NOT MET)      Long Term Goals: 8 weeks   Pt will score </= 25% on FOTO limitation survey in order to improve self-perception of functional mobility deficits. (PROGRESSING, NOT MET)   Pt will improve BLE MMT grades by >/=1 grade in order to improve strength for ADL completion. (PROGRESSING, NOT MET)   Pt will score >/= 29/30 on Functional Gait Assessment with least restrictive assistive device in order to reduce risk for falls and improve postural control. (PROGRESSING, NOT MET)   Pt will score >/= 15 repetitions on 30-Second Sit to  order to improve BLE endurance and muscular power for transfers. (PROGRESSING, NOT MET)    Pt will walk >/= 1200 feet on Six-Minute Walk Test outdoors over multiple levels/surfaces in order to improve endurance for community mobility. (PROGRESSING, NOT MET)  .  Pt will perform 1 floor <> stand transfer with single UE support in order to demonstrate safe functional mobility in case of future fall.  (PROGRESSING, NOT MET)   Pt will report 0 falls from initiation of PT management. (PROGRESSING, NOT MET)   Pt will begin some form of home/community fitness in order to sustain progress gained in PT. (PROGRESSING, NOT MET)     PLAN     Plan to cont with progression of PT goals per POC.  Continue with METs if needed.    Stephanie Astorga, PT

## 2023-04-04 ENCOUNTER — CLINICAL SUPPORT (OUTPATIENT)
Dept: REHABILITATION | Facility: HOSPITAL | Age: 83
End: 2023-04-04
Payer: MEDICARE

## 2023-04-04 DIAGNOSIS — R29.898 WEAKNESS OF BOTH HIPS: ICD-10-CM

## 2023-04-04 DIAGNOSIS — F40.298 FEAR OF FALLING: Primary | ICD-10-CM

## 2023-04-04 PROCEDURE — 97530 THERAPEUTIC ACTIVITIES: CPT | Mod: PN,CQ

## 2023-04-04 PROCEDURE — 97112 NEUROMUSCULAR REEDUCATION: CPT | Mod: PN,CQ

## 2023-04-04 PROCEDURE — 97110 THERAPEUTIC EXERCISES: CPT | Mod: PN,CQ

## 2023-04-04 NOTE — PROGRESS NOTES
" [facility]   Physical Therapy Treatment Note     Name: Ning Alcaraz [facility]  Clinic Number: [id number]    Therapy Diagnosis:   Encounter Diagnoses   Name Primary?    Fear of falling Yes    Weakness of both hips      Physician: Arben Garcia MD    Visit Date: 4/4/2023    Physician Orders: PT Eval and Treat   Medical Diagnosis from Referral: I62.00 (ICD-10-CM) - Nontraumatic intracranial subdural hemorrhage S09.90XA (ICD-10-CM) - Head trauma, initial encounter   Evaluation Date: 3/21/2023  Authorization Period Expiration: 04/18/2023   Plan of Care Expiration: 5/5/2023  Visit # / Visits authorized: 3/11 (+ eval)  FOTO #: 3/5  PTA Visit #: 1/5     Time In: 4:45 pm   Time Out: 5:30 pm  Total Billable Time: 45 minutes (1 TE + 1 NMR + 1 TA)    Precautions: Standard and Fall    SUBJECTIVE     Pt reports: Left hip pain has since resolved and currently without any complaints.  She was compliant with home exercise program.  Response to previous treatment: initial eval  Functional change: none    Pain: 0/10  Location: right knee      OBJECTIVE     Objective Measures updated at progress report unless specified.       Treatment     Ning received the treatments listed below:      therapeutic exercises to develop strength, endurance, flexibility, and posture for 20 minutes including:  Nustep level 3.0 progressive hills 8 minutes  Hamstring stretch at stairs: 2x30" B  Hip flexor stetch at stairs: 2x30" B  Hooklying piriformis stretch 30" x 3  Hooklying TA activation: 5" holds x 20   Hooklying Pelvic Tilts: 5" holds 2 x 10   Heel/Toe Raises: 2x10  Standing hip 3 way with yellow theraband: x15 ea     Not performed today:  - muscle energy technique for left posterior/right anterior rotation          neuromuscular re-education activities to improve: Balance, Coordination, Kinesthetic, and Proprioception for 15 minutes. The following activities were included:  High march with opposite knee tap  Tandem " "walkin lengths x 10 feet  Backwards walkin lengths x 10 feet  Hurdles: 6" inches , forward/lateral 6 lengths x 10 feet ea way  Forward/Backward crossovers: 6 lengths x 10 feet    therapeutic activities to improve functional performance for 10 minutes, including:  Not today:  Sit to stand from standard chair: 2x10   6" Step ups: forward 2x10 B                : lateral up and over 2x10  Forward lunges: x10 B      Patient Education and Home Exercises     Home Exercises Provided and Patient Education Provided     Education provided:   - daily HEP compliance    Written Home Exercises Provided: Patient instructed to cont prior HEP. Exercises were reviewed and Ning was able to demonstrate them prior to the end of the session.  Ning demonstrated good  understanding of the education provided. See EMR under Patient Instructions for exercises provided during therapy sessions    ASSESSMENT     Ning arrived to session without any complaints of L hip pain and was agreeable to treatment.  Patient said hip pain she had been experiencing has resolved since previous session.  Fair carryover of proper technique of mat based core stabilization activity with verbal and tactile cuing required.  Pt tolerated balance activity with minimal UE support and no major loss of balance observed.  She would benefit from continued PT services to achieve goals set at initial evaluation.      Ning Is progressing well towards her goals.   Pt prognosis is Excellent.     Pt will continue to benefit from skilled outpatient physical therapy to address the deficits listed in the problem list box on initial evaluation, provide pt/family education and to maximize pt's level of independence in the home and community environment.     Pt's spiritual, cultural and educational needs considered and pt agreeable to plan of care and goals.     Anticipated barriers to physical therapy: none noted at this time    Goals:   Short Term Goals: 4 weeks   Pt " will be compliant with HEP in order to maximize PT benefits. (PROGRESSING, NOT MET)   Pt will improve BLE MMT grades by >/=1/2 grade in order to improve strength for ADL completion. (PROGRESSING, NOT MET)   Pt will complete TUG in </= 7 seconds with least restrictive assistive device in order to reduce risk for falls and improve safety with functional mobility. (PROGRESSING, NOT MET)   Pt will score >/= 27/30 on Functional Gait Assessment without assistive device in order to reduce risk for falls and improve postural control. (PROGRESSING, NOT MET)   Pt will score >/= 13 repetitions on 30-Second Sit to  order to improve BLE endurance and muscular power for transfers. (PROGRESSING, NOT MET)      Long Term Goals: 8 weeks   Pt will score </= 25% on FOTO limitation survey in order to improve self-perception of functional mobility deficits. (PROGRESSING, NOT MET)   Pt will improve BLE MMT grades by >/=1 grade in order to improve strength for ADL completion. (PROGRESSING, NOT MET)   Pt will score >/= 29/30 on Functional Gait Assessment with least restrictive assistive device in order to reduce risk for falls and improve postural control. (PROGRESSING, NOT MET)   Pt will score >/= 15 repetitions on 30-Second Sit to  order to improve BLE endurance and muscular power for transfers. (PROGRESSING, NOT MET)    Pt will walk >/= 1200 feet on Six-Minute Walk Test outdoors over multiple levels/surfaces in order to improve endurance for community mobility. (PROGRESSING, NOT MET)  .  Pt will perform 1 floor <> stand transfer with single UE support in order to demonstrate safe functional mobility in case of future fall. (PROGRESSING, NOT MET)   Pt will report 0 falls from initiation of PT management. (PROGRESSING, NOT MET)   Pt will begin some form of home/community fitness in order to sustain progress gained in PT. (PROGRESSING, NOT MET)     PLAN     Plan to cont with progression of PT goals per POC.  Continue with  METs if needed.    Ernestine Gonsalves, PTA

## 2023-04-10 ENCOUNTER — PATIENT MESSAGE (OUTPATIENT)
Dept: NEUROSURGERY | Facility: CLINIC | Age: 83
End: 2023-04-10
Payer: MEDICARE

## 2023-04-11 ENCOUNTER — CLINICAL SUPPORT (OUTPATIENT)
Dept: REHABILITATION | Facility: HOSPITAL | Age: 83
End: 2023-04-11
Payer: MEDICARE

## 2023-04-11 DIAGNOSIS — R29.898 WEAKNESS OF BOTH HIPS: ICD-10-CM

## 2023-04-11 DIAGNOSIS — F40.298 FEAR OF FALLING: Primary | ICD-10-CM

## 2023-04-11 PROCEDURE — 97112 NEUROMUSCULAR REEDUCATION: CPT | Mod: PN

## 2023-04-11 PROCEDURE — 97110 THERAPEUTIC EXERCISES: CPT | Mod: PN

## 2023-04-11 NOTE — PROGRESS NOTES
"  [facility]   Physical Therapy Treatment Note     Name: Ning Alcaraz [facility] [place]  Clinic Number: [id number]    Therapy Diagnosis:   Encounter Diagnoses   Name Primary?    Fear of falling Yes    Weakness of both hips      Physician: Arben Garcia MD    Visit Date: 4/11/2023    Physician Orders: PT Eval and Treat   Medical Diagnosis from Referral: I62.00 (ICD-10-CM) - Nontraumatic intracranial subdural hemorrhage S09.90XA (ICD-10-CM) - Head trauma, initial encounter   Evaluation Date: 3/21/2023  Authorization Period Expiration: 04/18/2023   Plan of Care Expiration: 5/5/2023  Visit # / Visits authorized: 3/11 (+ eval)  FOTO #: 3/5  PTA Visit #: 1/5     Time In: 3:15 pm   Time Out: 3:58  pm  Total Billable Time: 43 minutes (1 TE + 2 NMR)    Precautions: Standard and Fall    SUBJECTIVE     Pt reports: Pt denies left hip pain, has had some posterior headaches recently but not consistently.  No headache today.  She was compliant with home exercise program.  Response to previous treatment: no adverse response  Functional change: increased activity tolerance, no need for afternoon naps    Pain: 0/10  Location: right knee      OBJECTIVE     Objective Measures updated at progress report unless specified.       Treatment     Ning received the treatments listed below:      therapeutic exercises to develop strength, endurance, flexibility, and posture for 20 minutes including:  Nustep level 3.0 progressive hills 8 minutes  Hamstring stretch at stairs: 2x30" B  Heel raises off green foam oval 2 x 15 reps  Hip abduction 2 x 10 bilaterally with stance leg on green foam oval   Hip extension with yellow theraband 2 x 10 reps bilaterally  Standing hamstring curl with yellow band 2 x 10 reps bilaterally    Not today:  Hip flexor stetch at stairs: 2x30" B  Hooklying piriformis stretch 30" x 3  Hooklying TA activation: 5" holds x 20   Hooklying Pelvic Tilts: 5" holds 2 x 10   Heel/Toe Raises: " "2x10          neuromuscular re-education activities to improve: Balance, Coordination, Kinesthetic, and Proprioception for 23 minutes. The following activities were included:  High march with opposite knee tap 10 feet x 6  Tandem walkin lengths x 10 feet with intermittent upper extremity support   Backwards walkin lengths x 10 feet no upper extremity support   Hurdles: 6" inches , forward/lateral 6 lengths x 10 feet ea way  Forward/Backward crossovers: 6 lengths x 10 feet  Forward step ups on black foam soft step 2 x 10 reps bilaterally  Static stance in narrow base with volleyball pass/trunk twist   Tandem stance with volleyball chest press 2 x 10 bilaterally  Single leg stance with volleyball chest press 2 x 10 reps bilaterally      therapeutic activities to improve functional performance for 0 minutes, including:  Not today:  Sit to stand from standard chair: 2x10   6" Step ups: forward 2x10 B                : lateral up and over 2x10  Forward lunges: x10 B      Patient Education and Home Exercises     Home Exercises Provided and Patient Education Provided     Education provided:   - daily HEP compliance    Written Home Exercises Provided: Patient instructed to cont prior HEP. Exercises were reviewed and Ning was able to demonstrate them prior to the end of the session.  Ning demonstrated good  understanding of the education provided. See EMR under Patient Instructions for exercises provided during therapy sessions    ASSESSMENT     Ning arrived to session without any complaints of L hip pain and was agreeable to treatment.  Patient tolerated full session focused on balance activity and strengthening exercises without seated rest break and without onset of left hip pain. Pt tolerated balance activity with minimal UE support and no major loss of balance observed, good stepping strategy employed by patient to self correct minor loss of balance. She would benefit from continued PT services to achieve " goals set at initial evaluation.      Ning Is progressing well towards her goals.   Pt prognosis is Excellent.     Pt will continue to benefit from skilled outpatient physical therapy to address the deficits listed in the problem list box on initial evaluation, provide pt/family education and to maximize pt's level of independence in the home and community environment.     Pt's spiritual, cultural and educational needs considered and pt agreeable to plan of care and goals.     Anticipated barriers to physical therapy: none noted at this time    Goals:   Short Term Goals: 4 weeks   Pt will be compliant with HEP in order to maximize PT benefits. (PROGRESSING, NOT MET)   Pt will improve BLE MMT grades by >/=1/2 grade in order to improve strength for ADL completion. (PROGRESSING, NOT MET)   Pt will complete TUG in </= 7 seconds with least restrictive assistive device in order to reduce risk for falls and improve safety with functional mobility. (PROGRESSING, NOT MET)   Pt will score >/= 27/30 on Functional Gait Assessment without assistive device in order to reduce risk for falls and improve postural control. (PROGRESSING, NOT MET)   Pt will score >/= 13 repetitions on 30-Second Sit to  order to improve BLE endurance and muscular power for transfers. (PROGRESSING, NOT MET)      Long Term Goals: 8 weeks   Pt will score </= 25% on FOTO limitation survey in order to improve self-perception of functional mobility deficits. (PROGRESSING, NOT MET)   Pt will improve BLE MMT grades by >/=1 grade in order to improve strength for ADL completion. (PROGRESSING, NOT MET)   Pt will score >/= 29/30 on Functional Gait Assessment with least restrictive assistive device in order to reduce risk for falls and improve postural control. (PROGRESSING, NOT MET)   Pt will score >/= 15 repetitions on 30-Second Sit to  order to improve BLE endurance and muscular power for transfers. (PROGRESSING, NOT MET)    Pt will walk >/=  1200 feet on Six-Minute Walk Test outdoors over multiple levels/surfaces in order to improve endurance for community mobility. (PROGRESSING, NOT MET)  .  Pt will perform 1 floor <> stand transfer with single UE support in order to demonstrate safe functional mobility in case of future fall. (PROGRESSING, NOT MET)   Pt will report 0 falls from initiation of PT management. (PROGRESSING, NOT MET)   Pt will begin some form of home/community fitness in order to sustain progress gained in PT. (PROGRESSING, NOT MET)     PLAN     Plan to cont with progression of PT goals per POC.  Continue with METs if needed.    Stephanie Astorga, PT

## 2023-04-18 ENCOUNTER — CLINICAL SUPPORT (OUTPATIENT)
Dept: REHABILITATION | Facility: HOSPITAL | Age: 83
End: 2023-04-18
Payer: MEDICARE

## 2023-04-18 DIAGNOSIS — F40.298 FEAR OF FALLING: Primary | ICD-10-CM

## 2023-04-18 DIAGNOSIS — R29.898 WEAKNESS OF BOTH HIPS: ICD-10-CM

## 2023-04-18 NOTE — PROGRESS NOTES
[facility]   Physical Therapy Treatment Note / Discharge Summary    Name: Ning Rodriguez  Clinic Number: [id number]    Therapy Diagnosis:   Encounter Diagnoses   Name Primary?    Fear of falling Yes    Weakness of both hips      Physician: Arben Garcia MD    Visit Date: 4/18/2023    Physician Orders: PT Eval and Treat   Medical Diagnosis from Referral: I62.00 (ICD-10-CM) - Nontraumatic intracranial subdural hemorrhage S09.90XA (ICD-10-CM) - Head trauma, initial encounter   Evaluation Date: 3/21/2023  Authorization Period Expiration: 04/18/2023   Plan of Care Expiration: 5/5/2023  Visit # / Visits authorized: 3/11 (+ eval)  FOTO #: 3/5  PTA Visit #: 1/5     Time In: 11:25 pm   Time Out: 1205  pm  Total Billable Time: 40 minutes (2 TE + 1 NMR)    Precautions: Standard and Fall    SUBJECTIVE     Pt reports: Pt feeling great, confidence has improved and patient says she is 95% back to normal.  She has a lot coming up with a trip to [place] and moving into [employer].  She was compliant with home exercise program.  Response to previous treatment: no adverse response  Functional change: increased activity tolerance, no need for afternoon naps    Pain: 0/10  Location: right knee      OBJECTIVE     Objective Measures updated at progress report unless specified.     6 minute walk test: 1275' without AD  30 second sit to stand - 13 reps with upper extremity assist  Timed Up and Go: 7.5 seconds without AD    Lower Extremity Strength    RLE LLE   Hip Flexion: 4+/5 4+/5   Hip Extension:  See 30 sec STS See 30 sec STS   Hip Abduction: 4+/5 4+/5   Hip Adduction: 4+/5 4+/5   Knee Extension: 5/5 5/5   Knee Flexion: 5/5 5/5   Ankle Dorsiflexion: 4+/5 4+/5   Ankle Plantarflexion: 4+/5 (NWB) 4+/5 (NWB)     Functional Gait Assessment:     *note: Measure a distance of 20 feet (~6 meters) for this assessment    1. Gait on level surface =  3   (3) Normal: less than 5.5 sec, no A.D., no imbalance, normal gait  pattern, deviates <6in   (2) Mild impairment: 7-5.6 sec, uses A.D., mild gait deviations, or deviates 6-10 in   (1) Moderate impairment: > 7 sec, slow speed, imbalance, deviates 10-15 in.   (0) Severe impairment: needs assist, deviates >15 in, reach/touch wall  2. Change in Gait Speed = 3   (3) Normal: smooth change w/o loss of balance or gait deviation, deviates < 6 in, significant difference between speeds   (2) Mild impairment: changes speed, but demonstrates mild gait deviations, deviates 6-10 in, OR no deviations but unable to significantly speed, OR uses A.D.   (1) Moderate impairment: minor changes to speed, OR changes speed w/ significant deviations, deviates 10-15 in, OR  Changes speed , but loses balance & recovers   (0) Severe impairment: cannot change speed, deviates >15 in, or loses balance & needs assist  3. Gait with horizontal head turns  = 3   (3) Normal: no change in gait, deviates <6 in   (2) Mild impairment: slight change in speed, deviates 6-10 in, OR uses A.D.   (1) Moderate impairment: moderate change in speed, deviates 10-15 in   (0) Severe impairment: severe disruption of gait, deviates >15in  4. Gait with vertical head turns = 3   (3) Normal: no change in gait, deviates <6 in   (2) Mild impairment: slight change in speed, deviates 6-10 in OR uses A.D.   (1) Moderate impairment: moderate change in speed, deviates 10-15 in   (0) Severe impairment: severe disruption of gait, deviates >15 in  5. Gait with pivot turns = 3   (3) Normal: performs safely in 3 sec, no LOB   (2) Mild impairment: performs in >3 sec & no LOB, OR turns safely & requires several steps to regain LOB   (1) Moderate impairment: turns slow, OR requires several small steps for balance following turn & stop   (0) Severe impairment: cannot turn safely, needs assist  6. Step over obstacle = 3   (3) Normal: steps over 2 stacked boxes w/o change in speed or LOB   (2) Mild impairment: able to step over 1 box w/o change in speed or  LOB   (1) Moderate impairment: steps over 1 box but must slow down, may require VC   (0) Severe impairment: cannot perform w/o assist  7. Gait with Narrow LUIZ = 3   (3) Normal: 10 steps no staggering   (2) Mild impairment: 7-9 steps   (1) Moderate impairment: 4-7 steps   (0) Severe impairment: < 4 steps or cannot perform w/o assist  8. Gait with eyes closed = 3   (3) Normal: < 7 sec, no A.D., no LOB, normal gait pattern, deviates <6 in   (2) Mild impairment: 7.1-9 sec, mild gait deviations, deviates 6-10 in   (1) Moderate impairment: > 9 sec, abnormal pattern, LOB, deviates 10-15 in   (0) Severe impairment: cannot perform w/o assist, LOB, deviates >15in  9. Ambulating Backwards = 3   (3) Normal: no A.D., no LOB, normal gait pattern, deviates <6in   (2) Mild impairment: uses A.D., slower speed, mild gait deviations, deviates 6-10 in   (1) Moderate impairment: slow speed, abnormal gait pattern, LOB, deviates 10-15 in   (0) Severe impairment: severe gait deviations or LOB, deviates >15in  10. Steps = 3   (3) Normal: alternating feet, no rail   (2) Mild Impairment: alternating feet, uses rail   (1) Moderate impairment: step-to, uses rail   (0) Severe impairment: cannot perform safely    Score 30/30     Cutoffs:   <22/30 fall risk in older adults  <18/30 fall risk in Parkinsons    MDC/MCID:  Stroke = 4.2 points (MDC)  Vestibular = 6 points (MDC)  Geriatric = 4 points (MCID)  Parkinson's = 4 points (MDC)     Treatment     Audrea received the treatments listed below:      therapeutic exercises to develop strength, endurance, flexibility, and posture for 25 minutes includin minute walk test  MMT  30 sec sit to stand  Heel raises off green foam oval 2 x 15 reps  Hip abduction 2 x 10 bilaterally with stance leg on green foam oval   Hip extension with yellow theraband 2 x 10 reps bilaterally  Sit to stand from chair without upper extremity assist 2 x 10 reps      neuromuscular re-education activities to improve: Balance,  "Coordination, Kinesthetic, and Proprioception for 15 minutes. The following activities were included:  FGA  Timed Up and Go    Tandem stance with volleyball chest press 2 x 10 bilaterally  Single leg stance with volleyball chest press 2 x 10 reps bilaterally      therapeutic activities to improve functional performance for 0 minutes, including:  Not today:  Sit to stand from standard chair: 2x10   6" Step ups: forward 2x10 B                : lateral up and over 2x10  Forward lunges: x10 B      Patient Education and Home Exercises     Home Exercises Provided and Patient Education Provided     Education provided:   - daily HEP compliance    Written Home Exercises Provided: Patient instructed to cont prior HEP. Exercises were reviewed and Ning was able to demonstrate them prior to the end of the session.  Ning demonstrated good  understanding of the education provided. See EMR under Patient Instructions for exercises provided during therapy sessions    ASSESSMENT     Ning has made progress in dynamic balance, bilateral lower extremity strength and demonstrates improved confidence and decreased fear of falling.  Objective testing completed today, including Timed Up and Go as well as Functional Gait Assessment shows patient is at low risk of falls.  Home exercise program reviewed with patient, who demonstrates good understanding of importance of continued participation to maintain functional gains. Pt has met most goals set at time of evaluation and is in agreement with plan to discharge PT services at this time.   Ning Is progressing well towards her goals.   Pt prognosis is Excellent.       Pt's spiritual, cultural and educational needs considered and pt agreeable to plan of care and goals.     Anticipated barriers to physical therapy: none noted at this time    Goals:   Short Term Goals: 4 weeks   Pt will be compliant with HEP in order to maximize PT benefits. MET  Pt will improve BLE MMT grades by >/=1/2 " grade in order to improve strength for ADL completion.  MET  Pt will complete TUG in </= 7 seconds with least restrictive assistive device in order to reduce risk for falls and improve safety with functional mobility.MET  Pt will score >/= 27/30 on Functional Gait Assessment without assistive device in order to reduce risk for falls and improve postural control.  MET  Pt will score >/= 13 repetitions on 30-Second Sit to  order to improve BLE endurance and muscular power for transfers. MET      Long Term Goals: 8 weeks   Pt will score </= 25% on FOTO limitation survey in order to improve self-perception of functional mobility deficits. MET  Pt will improve BLE MMT grades by >/=1 grade in order to improve strength for ADL completion. NOT MET  Pt will score >/= 29/30 on Functional Gait Assessment with least restrictive assistive device in order to reduce risk for falls and improve postural control. MET   Pt will score >/= 15 repetitions on 30-Second Sit to  order to improve BLE endurance and muscular power for transfers. NOT MET    Pt will walk >/= 1200 feet on Six-Minute Walk Test outdoors over multiple levels/surfaces in order to improve endurance for community mobility. MET  .  Pt will perform 1 floor <> stand transfer with single UE support in order to demonstrate safe functional mobility in case of future fall. MET   Pt will report 0 falls from initiation of PT management.  MET   Pt will begin some form of home/community fitness in order to sustain progress gained in PT. MET    PLAN     Discharge PT    Stephanie Astorga, PT

## 2023-05-02 ENCOUNTER — HOSPITAL ENCOUNTER (OUTPATIENT)
Dept: RADIOLOGY | Facility: HOSPITAL | Age: 83
Discharge: HOME | End: 2023-05-02
Attending: NEUROLOGICAL SURGERY
Payer: MEDICARE

## 2023-05-02 ENCOUNTER — OFFICE VISIT (OUTPATIENT)
Dept: NEUROSURGERY | Facility: CLINIC | Age: 83
End: 2023-05-02
Payer: MEDICARE

## 2023-05-02 VITALS — DIASTOLIC BLOOD PRESSURE: 62 MMHG | HEART RATE: 73 BPM | SYSTOLIC BLOOD PRESSURE: 130 MMHG

## 2023-05-02 DIAGNOSIS — S09.90XA HEAD TRAUMA, INITIAL ENCOUNTER: ICD-10-CM

## 2023-05-02 DIAGNOSIS — I62.00 NONTRAUMATIC SUBDURAL HEMORRHAGE, UNSPECIFIED: Primary | ICD-10-CM

## 2023-05-02 PROCEDURE — 1101F PR PT FALLS ASSESS DOC 0-1 FALLS W/OUT INJ PAST YR: ICD-10-PCS | Mod: CPTII,S$GLB,, | Performed by: PHYSICIAN ASSISTANT

## 2023-05-02 PROCEDURE — 99214 OFFICE O/P EST MOD 30 MIN: CPT | Mod: S$GLB,,, | Performed by: PHYSICIAN ASSISTANT

## 2023-05-02 PROCEDURE — 99214 PR OFFICE/OUTPT VISIT, EST, LEVL IV, 30-39 MIN: ICD-10-PCS | Mod: S$GLB,,, | Performed by: PHYSICIAN ASSISTANT

## 2023-05-02 PROCEDURE — 3075F SYST BP GE 130 - 139MM HG: CPT | Mod: CPTII,S$GLB,, | Performed by: PHYSICIAN ASSISTANT

## 2023-05-02 PROCEDURE — 3078F DIAST BP <80 MM HG: CPT | Mod: CPTII,S$GLB,, | Performed by: PHYSICIAN ASSISTANT

## 2023-05-02 PROCEDURE — 1126F PR PAIN SEVERITY QUANTIFIED, NO PAIN PRESENT: ICD-10-PCS | Mod: CPTII,S$GLB,, | Performed by: PHYSICIAN ASSISTANT

## 2023-05-02 PROCEDURE — 3078F PR MOST RECENT DIASTOLIC BLOOD PRESSURE < 80 MM HG: ICD-10-PCS | Mod: CPTII,S$GLB,, | Performed by: PHYSICIAN ASSISTANT

## 2023-05-02 PROCEDURE — 70450 CT HEAD/BRAIN W/O DYE: CPT | Mod: 26,,, | Performed by: RADIOLOGY

## 2023-05-02 PROCEDURE — 1101F PT FALLS ASSESS-DOCD LE1/YR: CPT | Mod: CPTII,S$GLB,, | Performed by: PHYSICIAN ASSISTANT

## 2023-05-02 PROCEDURE — 1159F MED LIST DOCD IN RCRD: CPT | Mod: CPTII,S$GLB,, | Performed by: PHYSICIAN ASSISTANT

## 2023-05-02 PROCEDURE — 1126F AMNT PAIN NOTED NONE PRSNT: CPT | Mod: CPTII,S$GLB,, | Performed by: PHYSICIAN ASSISTANT

## 2023-05-02 PROCEDURE — 70450 CT HEAD WITHOUT CONTRAST: ICD-10-PCS | Mod: 26,,, | Performed by: RADIOLOGY

## 2023-05-02 PROCEDURE — 70450 CT HEAD/BRAIN W/O DYE: CPT | Mod: TC

## 2023-05-02 PROCEDURE — 1159F PR MEDICATION LIST DOCUMENTED IN MEDICAL RECORD: ICD-10-PCS | Mod: CPTII,S$GLB,, | Performed by: PHYSICIAN ASSISTANT

## 2023-05-02 PROCEDURE — 3288F PR FALLS RISK ASSESSMENT DOCUMENTED: ICD-10-PCS | Mod: CPTII,S$GLB,, | Performed by: PHYSICIAN ASSISTANT

## 2023-05-02 PROCEDURE — 3288F FALL RISK ASSESSMENT DOCD: CPT | Mod: CPTII,S$GLB,, | Performed by: PHYSICIAN ASSISTANT

## 2023-05-02 PROCEDURE — 99999 PR PBB SHADOW E&M-EST. PATIENT-LVL III: CPT | Mod: PBBFAC,,, | Performed by: PHYSICIAN ASSISTANT

## 2023-05-02 PROCEDURE — 3075F PR MOST RECENT SYSTOLIC BLOOD PRESS GE 130-139MM HG: ICD-10-PCS | Mod: CPTII,S$GLB,, | Performed by: PHYSICIAN ASSISTANT

## 2023-05-02 PROCEDURE — 99999 PR PBB SHADOW E&M-EST. PATIENT-LVL III: ICD-10-PCS | Mod: PBBFAC,,, | Performed by: PHYSICIAN ASSISTANT

## 2023-05-02 RX ORDER — ORAL SEMAGLUTIDE 3 MG/1
3 TABLET ORAL DAILY
Qty: 90 TABLET | Refills: 4 | OUTPATIENT
Start: 2023-05-02 | End: 2024-02-06

## 2023-05-02 NOTE — PROGRESS NOTES
Neurosurgery  Established Patient    SUBJECTIVE:     History of Present Illness:  This is a 83-year-old female who recently diagnosed bilateral subacute subdural hematoma requiring a left frontal parietal crani[place].  She is since returned to the [place] and saw Dr. Garcia in clinic in March 2023.  At that time, the right sided SDH was improved but the left was slightly larger than immediately post-op.   She presents today for follow up with repeat CTH. She denies any new concerns since her last visit. She denies new or worsening HA, seizures or new focal deficits. Incision is well healed.    Review of patient's allergies indicates:  No Known Allergies    Current Outpatient Medications   Medication Sig Dispense Refill    amLODIPine (NORVASC) 10 MG tablet Take 1 tablet (10 mg total) by mouth once daily. 90 tablet 3    augmented betamethasone (DIPROLENE) 0.05 % gel Apply topically to the affected area 4 to 5 times a day while having symptoms 50 g 0    blood sugar diagnostic (CONTOUR TEST STRIPS) Strp Check blood sugar twice weekly plus as directed by  each 6    celecoxib (CELEBREX) 100 MG capsule take 1 Capsule by mouth  twice daily as needed 180 capsule 1    cevimeline (EVOXAC) 30 mg capsule Take 1 capsule (30 mg total) by mouth 3 (three) times daily. 90 capsule 11    flu vac 2021 [id number],PF, (FLUAD QUAD 2021-22,65Y UP,,PF,) 60 mcg (15 mcg x 4)/0.5 mL Syrg inject by [facility] 0.5 mL 0    glipiZIDE (GLUCOTROL) 5 MG tablet Take 1 tablet (5 mg total) by mouth 2 (two) times daily. 180 tablet 1    glipiZIDE (GLUCOTROL) 5 MG tablet Take 1 tablet (5 mg total) by mouth 2 (two) times daily. 180 tablet 1    levothyroxine (SYNTHROID) 25 MCG tablet Take 1 tablet (25 mcg total) by mouth before breakfast. 30 tablet 5    omeprazole (PRILOSEC) 20 MG capsule Take 1 capsule by mouth daily 90 capsule 3    promethazine-dextromethorphan (PROMETHAZINE-DM) 6.25-15 mg/5 mL Syrp Take 5 mLs by mouth nightly as needed (cough). 180 mL  0    semaglutide (RYBELSUS) 3 mg tablet Take 1 tablet (3 mg total) by mouth once daily. 90 tablet 4    triamcinolone (NASACORT) 55 mcg nasal inhaler 2 sprays by Nasal route once daily. 10.8 g 0    amLODIPine (NORVASC) 5 MG tablet Take 1 tablet (5 mg total) by mouth once daily. (Patient taking differently: Take 10 mg by mouth once daily.) 90 tablet 3    benzonatate (TESSALON PERLES) 100 MG capsule Take 1 capsule (100 mg total) by mouth every 6 (six) hours as needed for Cough. 30 capsule 1    omeprazole (PRILOSEC) 20 MG capsule Take 20 mg by mouth once daily.       Current Facility-Administered Medications   Medication Dose Route Frequency Provider Last Rate Last Admin    sodium chloride 0.9% flush 10 mL  10 mL Intravenous PRN Tami Zarate MD           Past Medical History:   Diagnosis Date    Allergy     Arthritis     Cataract     Diabetes mellitus, type 2     Hypertension     Thyroid disease      Past Surgical History:   Procedure Laterality Date    CATARACT EXTRACTION W/  INTRAOCULAR LENS IMPLANT Right 5/16/2022    Procedure: EXTRACTION, CATARACT, WITH IOL INSERTION;  Surgeon: Tami Zarate MD;  Location: [facility];  Service: Ophthalmology;  Laterality: Right;  ORA ONLY FOR CYL MEASUREMENT/TORIC DECISION    CATARACT EXTRACTION W/  INTRAOCULAR LENS IMPLANT Left 6/20/2022    Procedure: EXTRACTION, CATARACT, WITH IOL INSERTION;  Surgeon: Tami Zarate MD;  Location: [facility];  Service: Ophthalmology;  Laterality: Left;  ORA ONLY FOR CYL MEASUREMENT/TORIC DECISION    HEMORRHOID SURGERY       Family History    None       Social History     Socioeconomic History    Marital status:    Tobacco Use    Smoking status: Former    Smokeless tobacco: Never   Substance and Sexual Activity    Alcohol use: No     Alcohol/week: 0.0 standard drinks    Drug use: No       Review of Systems   Constitutional:  Negative for activity change and appetite change.   Musculoskeletal:  Negative for neck pain and neck stiffness.    Neurological:  Negative for dizziness, seizures, speech difficulty, weakness, numbness and headaches.   All other systems reviewed and are negative.    OBJECTIVE:     Vital Signs  Pulse: 73  BP: 130/62  Pain Score: 0-No pain  There is no height or weight on file to calculate BMI.    Neurosurgery Physical Exam  General: well developed, well nourished, no distress.   Head: normocephalic, atraumatic  Neurologic: Alert and oriented. Thought content appropriate.  GCS: Motor: 6/Verbal: 5/Eyes: 4 GCS Total: 15  Mental Status: Awake, Alert, Oriented x3  Cranial nerves: face symmetric, tongue midline, CN II-XII grossly intact.   Eyes: pupils equal, round, reactive to light with accomodation, EOMI.   Sensory: intact to light touch throughout  Motor Strength:Moves all extremities spontaneously with good tone.  Full strength upper and lower extremities. No abnormal movements seen.   DTR's - 2 + and symmetric in UE and LE  Pronator Drift: no drift noted  Finger-to-nose: Intact bilaterally  Gait: normal      Diagnostic Results:  EXAMINATION:  CT HEAD WITHOUT CONTRAST     CLINICAL HISTORY:  Head trauma, minor (Age >= 65y);Evaluate for subdural hematoma;  Unspecified injury of head, initial encounter     TECHNIQUE:  Multiple sequential 5 mm axial images of the head without contrast.  Coronal and sagittal reformatted imaging from the axial acquisition.     COMPARISON:  03/01/2023     FINDINGS:  Evolving operative change from left frontal parietal craniotomy and superimposed leslie holes for previous subdural hematoma evacuation.  There is continued although reduced mixed density extra-axial collection overlying the left cerebral hemisphere suggestive for resolving postoperative fluid and hemorrhage as well as residual subdural hemorrhage     There is no definite new hemorrhage or new abnormal parenchymal attenuation.  There is interval reduced mixed density extra-axial collection overlying the right cerebral convexity compatible  with resolving subdural hemorrhage with collection measuring approximately 0.7 cm in thickness on the left and 0.5 cm in thickness on the right image 81 series 601.  Ventricles are partially re-expanded without evidence for hydrocephalus.  No evidence for parenchymal hemorrhage or new abnormal parenchymal attenuation.  Question partially empty sella similar to prior there is resolution of previous trace cerebellar tonsillar ectopia likely related to reduced extra-axial collections.  Few trace ethmoid air cell opacities.     Impression:     Evolving operative change from left subdural hematoma evacuation with continued but reduced mixed density extra-axial collections overlying the cerebral hemispheres bilaterally suggestive for resolving subdural hemorrhages     No definite new hemorrhage or significant new abnormal parenchymal attenuation     Clinical correlation and continued follow-up advised        Electronically signed by: Nelson Hubbard DO  Date:                                            05/02/2023  Time:                                           14:32    ASSESSMENT/PLAN:     82 yo female with history of bilateral SDH s/p left sided craniotomy for evacuation in [place]. She is doing well at this stage with improvement of the SDH on recent CTH. There is still residual mixed density blood products on the left vs post op dural matrix product so I would like to obtain one more CTH in 3 months for stability. She was encouraged to call us with any questions or concerns in the meantime.       Maribel Escamilla PA-C  Neurosurgery          Note dictated with voice recognition software, please excuse any grammatical errors.

## 2023-08-02 ENCOUNTER — PATIENT MESSAGE (OUTPATIENT)
Dept: NEUROSURGERY | Facility: CLINIC | Age: 83
End: 2023-08-02
Payer: MEDICARE

## 2023-08-21 DIAGNOSIS — D69.6 THROMBOCYTOPENIA, UNSPECIFIED: ICD-10-CM

## 2023-08-21 DIAGNOSIS — D64.9 ANEMIA, UNSPECIFIED TYPE: Primary | ICD-10-CM

## 2023-08-23 ENCOUNTER — LAB VISIT (OUTPATIENT)
Dept: LAB | Facility: HOSPITAL | Age: 83
End: 2023-08-23
Payer: MEDICARE

## 2023-08-23 DIAGNOSIS — D69.6 THROMBOCYTOPENIA, UNSPECIFIED: ICD-10-CM

## 2023-08-23 DIAGNOSIS — D64.9 ANEMIA, UNSPECIFIED TYPE: ICD-10-CM

## 2023-08-23 LAB
ALBUMIN SERPL BCP-MCNC: 3.6 G/DL (ref 3.5–5.2)
ALP SERPL-CCNC: 112 U/L (ref 55–135)
ALT SERPL W/O P-5'-P-CCNC: 30 U/L (ref 10–44)
ANION GAP SERPL CALC-SCNC: 10 MMOL/L (ref 8–16)
AST SERPL-CCNC: 29 U/L (ref 10–40)
BASOPHILS # BLD AUTO: 0.05 K/UL (ref 0–0.2)
BASOPHILS NFR BLD: 1.9 % (ref 0–1.9)
BILIRUB SERPL-MCNC: 0.7 MG/DL (ref 0.1–1)
BUN SERPL-MCNC: 18 MG/DL (ref 8–23)
CALCIUM SERPL-MCNC: 9.2 MG/DL (ref 8.7–10.5)
CHLORIDE SERPL-SCNC: 110 MMOL/L (ref 95–110)
CO2 SERPL-SCNC: 18 MMOL/L (ref 23–29)
CREAT SERPL-MCNC: 0.9 MG/DL (ref 0.5–1.4)
CRP SERPL-MCNC: 1.4 MG/L (ref 0–8.2)
DIFFERENTIAL METHOD: ABNORMAL
EOSINOPHIL # BLD AUTO: 0.2 K/UL (ref 0–0.5)
EOSINOPHIL NFR BLD: 6.4 % (ref 0–8)
ERYTHROCYTE [DISTWIDTH] IN BLOOD BY AUTOMATED COUNT: 16.4 % (ref 11.5–14.5)
ERYTHROCYTE [SEDIMENTATION RATE] IN BLOOD BY PHOTOMETRIC METHOD: 27 MM/HR (ref 0–36)
EST. GFR  (NO RACE VARIABLE): >60 ML/MIN/1.73 M^2
FERRITIN SERPL-MCNC: 23 NG/ML (ref 20–300)
GLUCOSE SERPL-MCNC: 265 MG/DL (ref 70–110)
HCT VFR BLD AUTO: 34.1 % (ref 37–48.5)
HCV AB SERPL QL IA: NORMAL
HGB BLD-MCNC: 10.8 G/DL (ref 12–16)
IMM GRANULOCYTES # BLD AUTO: 0.01 K/UL (ref 0–0.04)
IMM GRANULOCYTES NFR BLD AUTO: 0.4 % (ref 0–0.5)
IRON SERPL-MCNC: 73 UG/DL (ref 30–160)
LDH SERPL L TO P-CCNC: 202 U/L (ref 110–260)
LYMPHOCYTES # BLD AUTO: 0.8 K/UL (ref 1–4.8)
LYMPHOCYTES NFR BLD: 31.3 % (ref 18–48)
MCH RBC QN AUTO: 27 PG (ref 27–31)
MCHC RBC AUTO-ENTMCNC: 31.7 G/DL (ref 32–36)
MCV RBC AUTO: 85 FL (ref 82–98)
MONOCYTES # BLD AUTO: 0.4 K/UL (ref 0.3–1)
MONOCYTES NFR BLD: 14 % (ref 4–15)
NEUTROPHILS # BLD AUTO: 1.2 K/UL (ref 1.8–7.7)
NEUTROPHILS NFR BLD: 46 % (ref 38–73)
NRBC BLD-RTO: 0 /100 WBC
PLATELET # BLD AUTO: 130 K/UL (ref 150–450)
PMV BLD AUTO: 11.3 FL (ref 9.2–12.9)
POTASSIUM SERPL-SCNC: 4.6 MMOL/L (ref 3.5–5.1)
PROT SERPL-MCNC: 6.9 G/DL (ref 6–8.4)
RBC # BLD AUTO: 4 M/UL (ref 4–5.4)
RETICS/RBC NFR AUTO: 1.8 % (ref 0.5–2.5)
SATURATED IRON: 15 % (ref 20–50)
SODIUM SERPL-SCNC: 138 MMOL/L (ref 136–145)
TOTAL IRON BINDING CAPACITY: 480 UG/DL (ref 250–450)
TRANSFERRIN SERPL-MCNC: 324 MG/DL (ref 200–375)
URATE SERPL-MCNC: 5.7 MG/DL (ref 2.4–5.7)
WBC # BLD AUTO: 2.65 K/UL (ref 3.9–12.7)

## 2023-08-23 PROCEDURE — 84550 ASSAY OF BLOOD/URIC ACID: CPT | Performed by: INTERNAL MEDICINE

## 2023-08-23 PROCEDURE — 85025 COMPLETE CBC W/AUTO DIFF WBC: CPT | Performed by: INTERNAL MEDICINE

## 2023-08-23 PROCEDURE — 84165 PATHOLOGIST INTERPRETATION SPE: ICD-10-PCS | Mod: 26,,, | Performed by: PATHOLOGY

## 2023-08-23 PROCEDURE — 85652 RBC SED RATE AUTOMATED: CPT | Performed by: INTERNAL MEDICINE

## 2023-08-23 PROCEDURE — 83615 LACTATE (LD) (LDH) ENZYME: CPT | Performed by: INTERNAL MEDICINE

## 2023-08-23 PROCEDURE — 84466 ASSAY OF TRANSFERRIN: CPT | Performed by: INTERNAL MEDICINE

## 2023-08-23 PROCEDURE — 86803 HEPATITIS C AB TEST: CPT | Performed by: INTERNAL MEDICINE

## 2023-08-23 PROCEDURE — 86334 IMMUNOFIX E-PHORESIS SERUM: CPT | Mod: 26,,, | Performed by: PATHOLOGY

## 2023-08-23 PROCEDURE — 84165 PROTEIN E-PHORESIS SERUM: CPT | Performed by: INTERNAL MEDICINE

## 2023-08-23 PROCEDURE — 36415 COLL VENOUS BLD VENIPUNCTURE: CPT | Performed by: INTERNAL MEDICINE

## 2023-08-23 PROCEDURE — 86334 PATHOLOGIST INTERPRETATION IFE: ICD-10-PCS | Mod: 26,,, | Performed by: PATHOLOGY

## 2023-08-23 PROCEDURE — 86334 IMMUNOFIX E-PHORESIS SERUM: CPT | Performed by: INTERNAL MEDICINE

## 2023-08-23 PROCEDURE — 82728 ASSAY OF FERRITIN: CPT | Performed by: INTERNAL MEDICINE

## 2023-08-23 PROCEDURE — 86140 C-REACTIVE PROTEIN: CPT | Performed by: INTERNAL MEDICINE

## 2023-08-23 PROCEDURE — 85045 AUTOMATED RETICULOCYTE COUNT: CPT | Performed by: INTERNAL MEDICINE

## 2023-08-23 PROCEDURE — 84165 PROTEIN E-PHORESIS SERUM: CPT | Mod: 26,,, | Performed by: PATHOLOGY

## 2023-08-23 PROCEDURE — 83521 IG LIGHT CHAINS FREE EACH: CPT | Performed by: INTERNAL MEDICINE

## 2023-08-23 PROCEDURE — 80053 COMPREHEN METABOLIC PANEL: CPT | Performed by: INTERNAL MEDICINE

## 2023-08-23 PROCEDURE — 83540 ASSAY OF IRON: CPT | Performed by: INTERNAL MEDICINE

## 2023-08-24 LAB
ALBUMIN SERPL ELPH-MCNC: 3.57 G/DL (ref 3.35–5.55)
ALPHA1 GLOB SERPL ELPH-MCNC: 0.26 G/DL (ref 0.17–0.41)
ALPHA2 GLOB SERPL ELPH-MCNC: 0.59 G/DL (ref 0.43–0.99)
B-GLOBULIN SERPL ELPH-MCNC: 0.84 G/DL (ref 0.5–1.1)
GAMMA GLOB SERPL ELPH-MCNC: 1.25 G/DL (ref 0.67–1.58)
INTERPRETATION SERPL IFE-IMP: NORMAL
KAPPA LC SER QL IA: 6.98 MG/DL (ref 0.33–1.94)
KAPPA LC/LAMBDA SER IA: 2.13 (ref 0.26–1.65)
LAMBDA LC SER QL IA: 3.27 MG/DL (ref 0.57–2.63)
PROT SERPL-MCNC: 6.5 G/DL (ref 6–8.4)

## 2023-08-27 LAB
PATHOLOGIST INTERPRETATION IFE: NORMAL
PATHOLOGIST INTERPRETATION SPE: NORMAL

## 2023-08-28 ENCOUNTER — TELEPHONE (OUTPATIENT)
Dept: HEMATOLOGY/ONCOLOGY | Facility: CLINIC | Age: 83
End: 2023-08-28
Payer: MEDICARE

## 2023-08-28 DIAGNOSIS — D53.9 NUTRITIONAL ANEMIA, UNSPECIFIED: Primary | ICD-10-CM

## 2023-09-05 ENCOUNTER — OFFICE VISIT (OUTPATIENT)
Dept: NEUROSURGERY | Facility: CLINIC | Age: 83
End: 2023-09-05
Payer: MEDICARE

## 2023-09-05 ENCOUNTER — HOSPITAL ENCOUNTER (OUTPATIENT)
Dept: RADIOLOGY | Facility: HOSPITAL | Age: 83
Discharge: HOME | End: 2023-09-05
Attending: PHYSICIAN ASSISTANT
Payer: MEDICARE

## 2023-09-05 VITALS — DIASTOLIC BLOOD PRESSURE: 80 MMHG | SYSTOLIC BLOOD PRESSURE: 155 MMHG | HEART RATE: 81 BPM

## 2023-09-05 DIAGNOSIS — I62.00 NONTRAUMATIC SUBDURAL HEMORRHAGE, UNSPECIFIED: ICD-10-CM

## 2023-09-05 DIAGNOSIS — I62.00 NONTRAUMATIC SUBDURAL HEMORRHAGE, UNSPECIFIED: Primary | ICD-10-CM

## 2023-09-05 PROCEDURE — 70450 CT HEAD/BRAIN W/O DYE: CPT | Mod: 26,,, | Performed by: RADIOLOGY

## 2023-09-05 PROCEDURE — 1157F ADVNC CARE PLAN IN RCRD: CPT | Mod: CPTII,S$GLB,, | Performed by: PHYSICIAN ASSISTANT

## 2023-09-05 PROCEDURE — 3288F PR FALLS RISK ASSESSMENT DOCUMENTED: ICD-10-PCS | Mod: CPTII,S$GLB,, | Performed by: PHYSICIAN ASSISTANT

## 2023-09-05 PROCEDURE — 70450 CT HEAD/BRAIN W/O DYE: CPT | Mod: TC

## 2023-09-05 PROCEDURE — 3077F SYST BP >= 140 MM HG: CPT | Mod: CPTII,S$GLB,, | Performed by: PHYSICIAN ASSISTANT

## 2023-09-05 PROCEDURE — 99999 PR PBB SHADOW E&M-EST. PATIENT-LVL III: ICD-10-PCS | Mod: PBBFAC,,, | Performed by: PHYSICIAN ASSISTANT

## 2023-09-05 PROCEDURE — 3288F FALL RISK ASSESSMENT DOCD: CPT | Mod: CPTII,S$GLB,, | Performed by: PHYSICIAN ASSISTANT

## 2023-09-05 PROCEDURE — 99213 OFFICE O/P EST LOW 20 MIN: CPT | Mod: S$GLB,,, | Performed by: PHYSICIAN ASSISTANT

## 2023-09-05 PROCEDURE — 3079F PR MOST RECENT DIASTOLIC BLOOD PRESSURE 80-89 MM HG: ICD-10-PCS | Mod: CPTII,S$GLB,, | Performed by: PHYSICIAN ASSISTANT

## 2023-09-05 PROCEDURE — 1126F PR PAIN SEVERITY QUANTIFIED, NO PAIN PRESENT: ICD-10-PCS | Mod: CPTII,S$GLB,, | Performed by: PHYSICIAN ASSISTANT

## 2023-09-05 PROCEDURE — 1126F AMNT PAIN NOTED NONE PRSNT: CPT | Mod: CPTII,S$GLB,, | Performed by: PHYSICIAN ASSISTANT

## 2023-09-05 PROCEDURE — 1101F PT FALLS ASSESS-DOCD LE1/YR: CPT | Mod: CPTII,S$GLB,, | Performed by: PHYSICIAN ASSISTANT

## 2023-09-05 PROCEDURE — 3077F PR MOST RECENT SYSTOLIC BLOOD PRESSURE >= 140 MM HG: ICD-10-PCS | Mod: CPTII,S$GLB,, | Performed by: PHYSICIAN ASSISTANT

## 2023-09-05 PROCEDURE — 3079F DIAST BP 80-89 MM HG: CPT | Mod: CPTII,S$GLB,, | Performed by: PHYSICIAN ASSISTANT

## 2023-09-05 PROCEDURE — 1157F PR ADVANCE CARE PLAN OR EQUIV PRESENT IN MEDICAL RECORD: ICD-10-PCS | Mod: CPTII,S$GLB,, | Performed by: PHYSICIAN ASSISTANT

## 2023-09-05 PROCEDURE — 99213 PR OFFICE/OUTPT VISIT, EST, LEVL III, 20-29 MIN: ICD-10-PCS | Mod: S$GLB,,, | Performed by: PHYSICIAN ASSISTANT

## 2023-09-05 PROCEDURE — 99999 PR PBB SHADOW E&M-EST. PATIENT-LVL III: CPT | Mod: PBBFAC,,, | Performed by: PHYSICIAN ASSISTANT

## 2023-09-05 PROCEDURE — 70450 CT HEAD WITHOUT CONTRAST: ICD-10-PCS | Mod: 26,,, | Performed by: RADIOLOGY

## 2023-09-05 PROCEDURE — 1101F PR PT FALLS ASSESS DOC 0-1 FALLS W/OUT INJ PAST YR: ICD-10-PCS | Mod: CPTII,S$GLB,, | Performed by: PHYSICIAN ASSISTANT

## 2023-09-05 NOTE — PROGRESS NOTES
PATIENT: Ning Rodriguez  MRN: [id number]  DATE: 9/6/2023    Diagnosis:   1. Pancytopenia    2. Other iron deficiency anemia    3. Other neutropenia    4. Thrombocytopenia, unspecified    5. Type 2 diabetes mellitus with hyperglycemia, without long-term current use of insulin    6. Advance care planning      Chief Complaint: Pancytopenia      Subjective:    History of Present Illness: Ms. Rodriguez is a 83 y.o. female who presents for evaluation and management of pancytopenia. She is referred by Dr. Rodriguez.    - labs on 8/23/23 reveal a neutropenia, mild anemia, and thrombocytopenia.  - iron studies (8/23/23) revealed a low-normal ferritin with elevated total iron binding capacity.  - today, she endorses intermittent cough, constipation, gastritis-type symptoms.  - she denies fever, unintentional weight loss.  - she denies gastrointestinal bleeding.  - she has been taking oral iron for the past 1-2 weeks.      Past medical, surgical, family, and social histories have been reviewed and updated below.    Past Medical History:   Past Medical History:   Diagnosis Date    Allergy     Arthritis     Cataract     Diabetes mellitus, type 2     Hypertension     Thyroid disease        Past Surgical History:   Past Surgical History:   Procedure Laterality Date    CATARACT EXTRACTION W/  INTRAOCULAR LENS IMPLANT Right 5/16/2022    Procedure: EXTRACTION, CATARACT, WITH IOL INSERTION;  Surgeon: Tami Zarate MD;  Location: [facility];  Service: Ophthalmology;  Laterality: Right;  ORA ONLY FOR CYL MEASUREMENT/TORIC DECISION    CATARACT EXTRACTION W/  INTRAOCULAR LENS IMPLANT Left 6/20/2022    Procedure: EXTRACTION, CATARACT, WITH IOL INSERTION;  Surgeon: Tami Zarate MD;  Location: [facility];  Service: Ophthalmology;  Laterality: Left;  ORA ONLY FOR CYL MEASUREMENT/TORIC DECISION    HEMORRHOID SURGERY         Family History: No family history on file.    Social History:  reports that she has quit smoking. She has never used smokeless  tobacco. She reports that she does not drink alcohol and does not use drugs.    Allergies:  Review of patient's allergies indicates:  No Known Allergies    Medications:  Current Outpatient Medications   Medication Sig Dispense Refill    augmented betamethasone (DIPROLENE) 0.05 % gel Apply topically to the affected area 4 to 5 times a day while having symptoms 50 g 0    blood sugar diagnostic (CONTOUR TEST STRIPS) Strp Check blood sugar twice weekly plus as directed by  each 6    celecoxib (CELEBREX) 100 MG capsule take 1 Capsule by mouth  twice daily as needed 180 capsule 1    cevimeline (EVOXAC) 30 mg capsule Take 1 capsule (30 mg total) by mouth 3 (three) times daily. 90 capsule 11    flu vac 2021 65up-yyaXZ30L,PF, (FLUAD QUAD 2021-22,65Y UP,,PF,) 60 mcg (15 mcg x 4)/0.5 mL Syrg inject by [facility] 0.5 mL 0    glipiZIDE (GLUCOTROL) 5 MG tablet Take 1 tablet (5 mg total) by mouth 2 (two) times daily. 180 tablet 1    glipiZIDE (GLUCOTROL) 5 MG tablet Take 1 tablet (5 mg total) by mouth 2 (two) times daily. 180 tablet 1    hydrALAZINE (APRESOLINE) 10 MG tablet Take 1 tablet by mouth 3 times a day 90 tablet 6    levothyroxine (SYNTHROID) 25 MCG tablet Take 1 tablet (25 mcg total) by mouth before breakfast. 30 tablet 5    nirmatrelvir-ritonavir (PAXLOVID, EUA,) 300 mg (150 mg x 2)-100 mg copackaged tablets (EUA) Take 150mg tablet and 100mg tablet twice a day x 5 days 20 tablet 0    omeprazole (PRILOSEC) 20 MG capsule Take 20 mg by mouth once daily.      omeprazole (PRILOSEC) 20 MG capsule Take 1 capsule by mouth daily 90 capsule 3    oseltamivir (TAMIFLU) 75 MG capsule TAKE 1 CAPSULE BY MOUTH TWICE A DAY 10 capsule 0    promethazine-dextromethorphan (PROMETHAZINE-DM) 6.25-15 mg/5 mL Syrp Take 5 mLs by mouth nightly as needed (cough). 180 mL 0    semaglutide (RYBELSUS) 3 mg tablet Take 1 tablet (3 mg total) by mouth once daily. 90 tablet 4    triamcinolone (NASACORT) 55 mcg nasal inhaler 2 sprays by Nasal route once  daily. 10.8 g 0     Current Facility-Administered Medications   Medication Dose Route Frequency Provider Last Rate Last Admin    sodium chloride 0.9% flush 10 mL  10 mL Intravenous PRN Tami Zarate MD           Review of Systems   Constitutional:  Negative for fatigue.   HENT:  Negative for sore throat.    Eyes:  Negative for visual disturbance.   Respiratory:  Positive for cough. Negative for shortness of breath.    Cardiovascular:  Negative for chest pain.   Gastrointestinal:  Positive for constipation. Negative for diarrhea, nausea and vomiting.   Genitourinary:  Negative for dysuria.   Musculoskeletal:  Negative for back pain.   Skin:  Negative for rash.   Neurological:  Negative for headaches.   Hematological:  Negative for adenopathy.   Psychiatric/Behavioral:  The patient is not nervous/anxious.        ECOG Performance Status:   ECOG SCORE    0 - Fully active-able to carry on all pre-disease performance without restriction         Objective:      Vitals:   Vitals:    09/06/23 1302   BP: 132/62   BP Location: Right arm   Patient Position: Sitting   BP Method: Large (Automatic)   Pulse: 66   Resp: 18   SpO2: 98%   Weight: 80.4 kg (177 lb 4 oz)     BMI: Body mass index is 28.61 kg/m².    Physical Exam  Vitals and nursing note reviewed.   Constitutional:       Appearance: She is well-developed.   HENT:      Head: Normocephalic and atraumatic.   Eyes:      Pupils: Pupils are equal, round, and reactive to light.   Cardiovascular:      Rate and Rhythm: Normal rate and regular rhythm.   Pulmonary:      Effort: Pulmonary effort is normal.      Breath sounds: Normal breath sounds.   Abdominal:      General: Bowel sounds are normal.      Palpations: Abdomen is soft.   Musculoskeletal:         General: Normal range of motion.      Cervical back: Normal range of motion and neck supple.   Skin:     General: Skin is warm and dry.   Neurological:      Mental Status: She is alert and oriented to person, place, and time.    Psychiatric:         Behavior: Behavior normal.         Thought Content: Thought content normal.         Judgment: Judgment normal.         Laboratory Data:  Labs have been reviewed.    Lab Results   Component Value Date    WBC 3.53 (L) 09/06/2023    HGB 11.6 (L) 09/06/2023    HCT 35.9 (L) 09/06/2023    MCV 86 09/06/2023     (L) 09/06/2023               Imaging:      Assessment:       1. Pancytopenia    2. Other iron deficiency anemia    3. Other neutropenia    4. Thrombocytopenia, unspecified    5. Type 2 diabetes mellitus with hyperglycemia, without long-term current use of insulin    6. Advance care planning           Plan:     Pancytopenia / neutropenia / iron deficiency anemia / thrombocytopenia  - I have reviewed her chart  - labs on 8/23/23 reveal a neutropenia, mild anemia, and thrombocytopenia.  - iron studies (8/23/23) revealed a low-normal ferritin with elevated total iron binding capacity, consistent with iron deficiency.  - she has been taking oral iron for the past 2 weeks.  - follow-up CBC reveals an increase in hemoglobin to 11.6 g/dL. Follow up iron studies  - continue oral iron  - kappa and lambda free light chains were mildly elevated, but serum protein electrophoresis and immunofixation were negative/normal. Will likely monitor.  - refer to gastroenterology for consideration of source of blood loss/iron deficiency. Her  has reached out to Dr. Michele to set up an appointment.  - follow up B12/folate.  - interval improvement in granulocyte count from 1.2 k/uL to 2.2 k/uL.  - return to clinic in 2 months with repeat labs.    2. Type 2 diabetes  - elevated glucose noted on metabolic panels. I do not see a hemoglobin A1c in our records.  - continue diabetic medications.  - defer management to primary care    3. Advance Care Planning     Date: 09/06/2023    Power of   I initiated the process of voluntary advance care planning today and explained the importance of this process to  the patient.  I introduced the concept of advance directives to the patient, as well. Then the patient received detailed information about the importance of designating a Health Care Power of  (HCPOA). She was also instructed to communicate with this person about their wishes for future healthcare, should she become sick and lose decision-making capacity. The patient has not previously appointed a HCPOA. After our discussion, the patient has decided to complete a HCPOA and has appointed her   Gume Rodriguez ([phone]) and daughters Renae Andrew ([phone]) and Susana Rodriguez ([phone]) . I spent a total time of 16 minutes discussing this issue with the patient.          - return to clinic in 2 months with repeat labs.    Chance Mensah M.D.  Hematology/Oncology  [facility] [address], [address]  [place] [address]  Phone: [phone]  Fax: [phone]

## 2023-09-06 ENCOUNTER — OFFICE VISIT (OUTPATIENT)
Dept: HEMATOLOGY/ONCOLOGY | Facility: CLINIC | Age: 83
End: 2023-09-06
Payer: MEDICARE

## 2023-09-06 ENCOUNTER — LAB VISIT (OUTPATIENT)
Dept: LAB | Facility: HOSPITAL | Age: 83
End: 2023-09-06
Attending: INTERNAL MEDICINE
Payer: MEDICARE

## 2023-09-06 VITALS
OXYGEN SATURATION: 98 % | DIASTOLIC BLOOD PRESSURE: 62 MMHG | HEART RATE: 66 BPM | WEIGHT: 177.25 LBS | RESPIRATION RATE: 18 BRPM | BODY MASS INDEX: 28.61 KG/M2 | SYSTOLIC BLOOD PRESSURE: 132 MMHG

## 2023-09-06 DIAGNOSIS — D50.8 OTHER IRON DEFICIENCY ANEMIA: ICD-10-CM

## 2023-09-06 DIAGNOSIS — D53.9 NUTRITIONAL ANEMIA, UNSPECIFIED: ICD-10-CM

## 2023-09-06 DIAGNOSIS — D70.8 OTHER NEUTROPENIA: ICD-10-CM

## 2023-09-06 DIAGNOSIS — D64.9 ANEMIA, UNSPECIFIED TYPE: ICD-10-CM

## 2023-09-06 DIAGNOSIS — Z71.89 ADVANCE CARE PLANNING: ICD-10-CM

## 2023-09-06 DIAGNOSIS — D69.6 THROMBOCYTOPENIA, UNSPECIFIED: ICD-10-CM

## 2023-09-06 DIAGNOSIS — E11.65 TYPE 2 DIABETES MELLITUS WITH HYPERGLYCEMIA, WITHOUT LONG-TERM CURRENT USE OF INSULIN: ICD-10-CM

## 2023-09-06 DIAGNOSIS — D61.818 PANCYTOPENIA: Primary | ICD-10-CM

## 2023-09-06 LAB
ALBUMIN SERPL BCP-MCNC: 3.6 G/DL (ref 3.5–5.2)
ALP SERPL-CCNC: 100 U/L (ref 55–135)
ALT SERPL W/O P-5'-P-CCNC: 32 U/L (ref 10–44)
ANION GAP SERPL CALC-SCNC: 10 MMOL/L (ref 8–16)
AST SERPL-CCNC: 32 U/L (ref 10–40)
BASOPHILS # BLD AUTO: 0.04 K/UL (ref 0–0.2)
BASOPHILS NFR BLD: 1.1 % (ref 0–1.9)
BILIRUB SERPL-MCNC: 0.8 MG/DL (ref 0.1–1)
BUN SERPL-MCNC: 13 MG/DL (ref 8–23)
CALCIUM SERPL-MCNC: 9 MG/DL (ref 8.7–10.5)
CHLORIDE SERPL-SCNC: 109 MMOL/L (ref 95–110)
CO2 SERPL-SCNC: 20 MMOL/L (ref 23–29)
CREAT SERPL-MCNC: 0.8 MG/DL (ref 0.5–1.4)
DIFFERENTIAL METHOD: ABNORMAL
EOSINOPHIL # BLD AUTO: 0.1 K/UL (ref 0–0.5)
EOSINOPHIL NFR BLD: 3.4 % (ref 0–8)
ERYTHROCYTE [DISTWIDTH] IN BLOOD BY AUTOMATED COUNT: 17.9 % (ref 11.5–14.5)
EST. GFR  (NO RACE VARIABLE): >60 ML/MIN/1.73 M^2
FERRITIN SERPL-MCNC: 71 NG/ML (ref 20–300)
FOLATE SERPL-MCNC: 11.8 NG/ML (ref 4–24)
GLUCOSE SERPL-MCNC: 241 MG/DL (ref 70–110)
HCT VFR BLD AUTO: 35.9 % (ref 37–48.5)
HGB BLD-MCNC: 11.6 G/DL (ref 12–16)
IMM GRANULOCYTES # BLD AUTO: 0.01 K/UL (ref 0–0.04)
IMM GRANULOCYTES NFR BLD AUTO: 0.3 % (ref 0–0.5)
IRON SERPL-MCNC: 158 UG/DL (ref 30–160)
LDH SERPL L TO P-CCNC: 184 U/L (ref 110–260)
LYMPHOCYTES # BLD AUTO: 0.8 K/UL (ref 1–4.8)
LYMPHOCYTES NFR BLD: 22.7 % (ref 18–48)
MCH RBC QN AUTO: 27.7 PG (ref 27–31)
MCHC RBC AUTO-ENTMCNC: 32.3 G/DL (ref 32–36)
MCV RBC AUTO: 86 FL (ref 82–98)
MONOCYTES # BLD AUTO: 0.4 K/UL (ref 0.3–1)
MONOCYTES NFR BLD: 9.9 % (ref 4–15)
NEUTROPHILS # BLD AUTO: 2.2 K/UL (ref 1.8–7.7)
NEUTROPHILS NFR BLD: 62.6 % (ref 38–73)
NRBC BLD-RTO: 0 /100 WBC
PLATELET # BLD AUTO: 121 K/UL (ref 150–450)
PMV BLD AUTO: 11.3 FL (ref 9.2–12.9)
POTASSIUM SERPL-SCNC: 4.3 MMOL/L (ref 3.5–5.1)
PROT SERPL-MCNC: 7.1 G/DL (ref 6–8.4)
RBC # BLD AUTO: 4.19 M/UL (ref 4–5.4)
SATURATED IRON: 33 % (ref 20–50)
SODIUM SERPL-SCNC: 139 MMOL/L (ref 136–145)
TOTAL IRON BINDING CAPACITY: 472 UG/DL (ref 250–450)
TRANSFERRIN SERPL-MCNC: 319 MG/DL (ref 200–375)
URATE SERPL-MCNC: 4.6 MG/DL (ref 2.4–5.7)
VIT B12 SERPL-MCNC: 408 PG/ML (ref 210–950)
WBC # BLD AUTO: 3.53 K/UL (ref 3.9–12.7)

## 2023-09-06 PROCEDURE — 82728 ASSAY OF FERRITIN: CPT | Performed by: INTERNAL MEDICINE

## 2023-09-06 PROCEDURE — 99999 PR PBB SHADOW E&M-EST. PATIENT-LVL IV: CPT | Mod: PBBFAC,,, | Performed by: INTERNAL MEDICINE

## 2023-09-06 PROCEDURE — 84550 ASSAY OF BLOOD/URIC ACID: CPT | Performed by: INTERNAL MEDICINE

## 2023-09-06 PROCEDURE — 1100F PTFALLS ASSESS-DOCD GE2>/YR: CPT | Mod: CPTII,S$GLB,, | Performed by: INTERNAL MEDICINE

## 2023-09-06 PROCEDURE — 1160F PR REVIEW ALL MEDS BY PRESCRIBER/CLIN PHARMACIST DOCUMENTED: ICD-10-PCS | Mod: CPTII,S$GLB,, | Performed by: INTERNAL MEDICINE

## 2023-09-06 PROCEDURE — 1159F PR MEDICATION LIST DOCUMENTED IN MEDICAL RECORD: ICD-10-PCS | Mod: CPTII,S$GLB,, | Performed by: INTERNAL MEDICINE

## 2023-09-06 PROCEDURE — 1126F AMNT PAIN NOTED NONE PRSNT: CPT | Mod: CPTII,S$GLB,, | Performed by: INTERNAL MEDICINE

## 2023-09-06 PROCEDURE — 3075F PR MOST RECENT SYSTOLIC BLOOD PRESS GE 130-139MM HG: ICD-10-PCS | Mod: CPTII,S$GLB,, | Performed by: INTERNAL MEDICINE

## 2023-09-06 PROCEDURE — 82607 VITAMIN B-12: CPT | Performed by: INTERNAL MEDICINE

## 2023-09-06 PROCEDURE — 99497 PR ADVNCD CARE PLAN 30 MIN: ICD-10-PCS | Mod: S$GLB,,, | Performed by: INTERNAL MEDICINE

## 2023-09-06 PROCEDURE — 1158F ADVNC CARE PLAN TLK DOCD: CPT | Mod: CPTII,S$GLB,, | Performed by: INTERNAL MEDICINE

## 2023-09-06 PROCEDURE — 80053 COMPREHEN METABOLIC PANEL: CPT | Performed by: INTERNAL MEDICINE

## 2023-09-06 PROCEDURE — 1100F PR PT FALLS ASSESS DOC 2+ FALLS/FALL W/INJURY/YR: ICD-10-PCS | Mod: CPTII,S$GLB,, | Performed by: INTERNAL MEDICINE

## 2023-09-06 PROCEDURE — 99999 PR PBB SHADOW E&M-EST. PATIENT-LVL IV: ICD-10-PCS | Mod: PBBFAC,,, | Performed by: INTERNAL MEDICINE

## 2023-09-06 PROCEDURE — 3075F SYST BP GE 130 - 139MM HG: CPT | Mod: CPTII,S$GLB,, | Performed by: INTERNAL MEDICINE

## 2023-09-06 PROCEDURE — 85025 COMPLETE CBC W/AUTO DIFF WBC: CPT | Performed by: INTERNAL MEDICINE

## 2023-09-06 PROCEDURE — 1126F PR PAIN SEVERITY QUANTIFIED, NO PAIN PRESENT: ICD-10-PCS | Mod: CPTII,S$GLB,, | Performed by: INTERNAL MEDICINE

## 2023-09-06 PROCEDURE — 82746 ASSAY OF FOLIC ACID SERUM: CPT | Performed by: INTERNAL MEDICINE

## 2023-09-06 PROCEDURE — 99204 PR OFFICE/OUTPT VISIT, NEW, LEVL IV, 45-59 MIN: ICD-10-PCS | Mod: S$GLB,,, | Performed by: INTERNAL MEDICINE

## 2023-09-06 PROCEDURE — 1159F MED LIST DOCD IN RCRD: CPT | Mod: CPTII,S$GLB,, | Performed by: INTERNAL MEDICINE

## 2023-09-06 PROCEDURE — 3078F DIAST BP <80 MM HG: CPT | Mod: CPTII,S$GLB,, | Performed by: INTERNAL MEDICINE

## 2023-09-06 PROCEDURE — 84466 ASSAY OF TRANSFERRIN: CPT | Performed by: INTERNAL MEDICINE

## 2023-09-06 PROCEDURE — 99497 ADVNCD CARE PLAN 30 MIN: CPT | Mod: S$GLB,,, | Performed by: INTERNAL MEDICINE

## 2023-09-06 PROCEDURE — 83540 ASSAY OF IRON: CPT | Performed by: INTERNAL MEDICINE

## 2023-09-06 PROCEDURE — 3078F PR MOST RECENT DIASTOLIC BLOOD PRESSURE < 80 MM HG: ICD-10-PCS | Mod: CPTII,S$GLB,, | Performed by: INTERNAL MEDICINE

## 2023-09-06 PROCEDURE — 83615 LACTATE (LD) (LDH) ENZYME: CPT | Performed by: INTERNAL MEDICINE

## 2023-09-06 PROCEDURE — 3288F FALL RISK ASSESSMENT DOCD: CPT | Mod: CPTII,S$GLB,, | Performed by: INTERNAL MEDICINE

## 2023-09-06 PROCEDURE — 1160F RVW MEDS BY RX/DR IN RCRD: CPT | Mod: CPTII,S$GLB,, | Performed by: INTERNAL MEDICINE

## 2023-09-06 PROCEDURE — 3288F PR FALLS RISK ASSESSMENT DOCUMENTED: ICD-10-PCS | Mod: CPTII,S$GLB,, | Performed by: INTERNAL MEDICINE

## 2023-09-06 PROCEDURE — 1158F PR ADVANCE CARE PLANNING DISCUSS DOCUMENTED IN MEDICAL RECORD: ICD-10-PCS | Mod: CPTII,S$GLB,, | Performed by: INTERNAL MEDICINE

## 2023-09-06 PROCEDURE — 99204 OFFICE O/P NEW MOD 45 MIN: CPT | Mod: S$GLB,,, | Performed by: INTERNAL MEDICINE

## 2023-09-06 RX ORDER — GLIPIZIDE 5 MG/1
TABLET ORAL
Qty: 180 TABLET | Refills: 1 | Status: CANCELLED | OUTPATIENT
Start: 2023-09-03

## 2023-09-07 ENCOUNTER — DOCUMENTATION ONLY (OUTPATIENT)
Dept: NEUROLOGY | Facility: HOSPITAL | Age: 83
End: 2023-09-07
Payer: MEDICARE

## 2023-09-07 ENCOUNTER — TELEPHONE (OUTPATIENT)
Dept: GASTROENTEROLOGY | Facility: CLINIC | Age: 83
End: 2023-09-07
Payer: MEDICARE

## 2023-09-07 NOTE — TELEPHONE ENCOUNTER
----- Message from Dov Michele MD sent at 9/7/2023 10:00 AM CDT -----  Please add this patient to see me in clinic on Monday at 1:30pm for a new diagnosis of iron deficiency anemia. 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 Try his number if she doesn't answer.   Thanks!

## 2023-09-07 NOTE — TELEPHONE ENCOUNTER
Messages placed on pt's voicemail of home and mobile.  Attempt to schedule gi clinic appt on Monday, September 11, 2023 at 145pm.  Message also placed on 's voicemail.

## 2023-09-07 NOTE — TELEPHONE ENCOUNTER
----- Message from Samantha Nation sent at 9/7/2023 11:33 AM CDT -----  Type:  Patient Returning Call    Who Called:Dr. Rodriguez  Who Left Message for Patient:Maryjo Kidd  Does the patient know what this is regarding?:returning call  Would the patient rather a call back or a response via [facility]? call  Best Call Back Number:[phone] or [phone]  Additional Information:

## 2023-09-07 NOTE — TELEPHONE ENCOUNTER
confirmed clinic appt on Monday, September 11, 2023 at 145pm.  Clinic address given and repeated correctly.

## 2023-09-11 ENCOUNTER — OFFICE VISIT (OUTPATIENT)
Dept: GASTROENTEROLOGY | Facility: CLINIC | Age: 83
End: 2023-09-11
Payer: MEDICARE

## 2023-09-11 VITALS
DIASTOLIC BLOOD PRESSURE: 71 MMHG | BODY MASS INDEX: 28.22 KG/M2 | SYSTOLIC BLOOD PRESSURE: 129 MMHG | WEIGHT: 175.63 LBS | HEART RATE: 71 BPM | HEIGHT: 66 IN

## 2023-09-11 DIAGNOSIS — D50.0 IRON DEFICIENCY ANEMIA SECONDARY TO BLOOD LOSS (CHRONIC): Primary | ICD-10-CM

## 2023-09-11 PROCEDURE — 99203 OFFICE O/P NEW LOW 30 MIN: CPT | Mod: S$GLB,,, | Performed by: INTERNAL MEDICINE

## 2023-09-11 PROCEDURE — 1126F PR PAIN SEVERITY QUANTIFIED, NO PAIN PRESENT: ICD-10-PCS | Mod: CPTII,S$GLB,, | Performed by: INTERNAL MEDICINE

## 2023-09-11 PROCEDURE — 1100F PR PT FALLS ASSESS DOC 2+ FALLS/FALL W/INJURY/YR: ICD-10-PCS | Mod: CPTII,S$GLB,, | Performed by: INTERNAL MEDICINE

## 2023-09-11 PROCEDURE — 1159F MED LIST DOCD IN RCRD: CPT | Mod: CPTII,S$GLB,, | Performed by: INTERNAL MEDICINE

## 2023-09-11 PROCEDURE — 1157F PR ADVANCE CARE PLAN OR EQUIV PRESENT IN MEDICAL RECORD: ICD-10-PCS | Mod: CPTII,S$GLB,, | Performed by: INTERNAL MEDICINE

## 2023-09-11 PROCEDURE — 3074F SYST BP LT 130 MM HG: CPT | Mod: CPTII,S$GLB,, | Performed by: INTERNAL MEDICINE

## 2023-09-11 PROCEDURE — 99203 PR OFFICE/OUTPT VISIT, NEW, LEVL III, 30-44 MIN: ICD-10-PCS | Mod: S$GLB,,, | Performed by: INTERNAL MEDICINE

## 2023-09-11 PROCEDURE — 99999 PR PBB SHADOW E&M-EST. PATIENT-LVL IV: CPT | Mod: PBBFAC,,, | Performed by: INTERNAL MEDICINE

## 2023-09-11 PROCEDURE — 1157F ADVNC CARE PLAN IN RCRD: CPT | Mod: CPTII,S$GLB,, | Performed by: INTERNAL MEDICINE

## 2023-09-11 PROCEDURE — 3078F DIAST BP <80 MM HG: CPT | Mod: CPTII,S$GLB,, | Performed by: INTERNAL MEDICINE

## 2023-09-11 PROCEDURE — 3288F PR FALLS RISK ASSESSMENT DOCUMENTED: ICD-10-PCS | Mod: CPTII,S$GLB,, | Performed by: INTERNAL MEDICINE

## 2023-09-11 PROCEDURE — 99999 PR PBB SHADOW E&M-EST. PATIENT-LVL IV: ICD-10-PCS | Mod: PBBFAC,,, | Performed by: INTERNAL MEDICINE

## 2023-09-11 PROCEDURE — 1159F PR MEDICATION LIST DOCUMENTED IN MEDICAL RECORD: ICD-10-PCS | Mod: CPTII,S$GLB,, | Performed by: INTERNAL MEDICINE

## 2023-09-11 PROCEDURE — 3078F PR MOST RECENT DIASTOLIC BLOOD PRESSURE < 80 MM HG: ICD-10-PCS | Mod: CPTII,S$GLB,, | Performed by: INTERNAL MEDICINE

## 2023-09-11 PROCEDURE — 3074F PR MOST RECENT SYSTOLIC BLOOD PRESSURE < 130 MM HG: ICD-10-PCS | Mod: CPTII,S$GLB,, | Performed by: INTERNAL MEDICINE

## 2023-09-11 PROCEDURE — 1100F PTFALLS ASSESS-DOCD GE2>/YR: CPT | Mod: CPTII,S$GLB,, | Performed by: INTERNAL MEDICINE

## 2023-09-11 PROCEDURE — 1126F AMNT PAIN NOTED NONE PRSNT: CPT | Mod: CPTII,S$GLB,, | Performed by: INTERNAL MEDICINE

## 2023-09-11 PROCEDURE — 3288F FALL RISK ASSESSMENT DOCD: CPT | Mod: CPTII,S$GLB,, | Performed by: INTERNAL MEDICINE

## 2023-09-11 NOTE — PROGRESS NOTES
"[facility]    CC: Anemia    HPI 83 y.o. female with a new, unexplained, iron deficiency anemia     Patient was found to have low platelets on labwork ~2 years ago, and recent labwork showed pancytopenia. She denies any symptoms including weakness, lightheadedness, shortness of breath, or decrease in energy. She had recently seen Dr. Mensah for her pancytopenia, and he noted increase in light chains although immunofixation was normal.    Was found to have a new AUDELIA with low iron and high TIBC.     She states that she had a colonoscopy >10 years ago, and was told that her results were completely normal. Denies hematemesis, hemoptysis, abdominal pain, hematuria, hematochezia, or melena. She has been taking PO iron for roughly 2 weeks, and has noticed mildly darker stools, although not black stools.      Past Medical History:   Hypertension   Type II DM   Thyroid disease         Physical Examination  /71 (BP Location: Left arm, Patient Position: Sitting, BP Method: Medium (Automatic))   Pulse 71   Ht 5' 6" (1.676 m)   Wt 79.6 kg (175 lb 9.5 oz)   BMI 28.34 kg/m²   General appearance: alert, cooperative, no distress  Abdomen: soft, non-tender; bowel sounds normoactive; no organomegaly  Extremities: extremities symmetric; no clubbing, cyanosis, or edema    Labs:  Lab Results   Component Value Date    WBC 3.53 (L) 09/06/2023    HGB 11.6 (L) 09/06/2023    HCT 35.9 (L) 09/06/2023    MCV 86 09/06/2023     (L) 09/06/2023       Iron: 158  TIBC: 472  Transferrin: 319  Ferritin: 71  Retic: 1.8      Assessment:   82 yo female presents for evaluation of a new diagnosis of iron deficiency anemia which is unexplained.       Plan:  - EGD/Colonoscopy on Monday 9/18  - Prescription for Suprep sent to pharmacy   - Iron supplementation as previously prescribed     Christiano Rios MD  [facility], [facility]    Dov Michele MD   [address] [place] [address], [address]   [place] [address] [phone]    "

## 2023-09-11 NOTE — PATIENT INSTRUCTIONS
SUPREP Instructions    [facility] [address]  Clinic Office [phone]  Endoscopy Lab [phone]    You are scheduled for a Egd/Colonoscopy with Dr. Michele  on Monday, September 18, 2023 at [facility] [place].    Check in at the Hospital -1st floor, Information desk.   Call [phone] to reschedule.    An adult friend/family member must come with you to drive you home.  You cannot drive, take a taxi, Uber/Lyft or bus to leave the Endoscopy Center alone.  If you do not have someone to drive you home, your test will be cancelled.     Please follow the directions of your doctor if you take any pills that thin your blood. If you take these meds: Aggrenox, Brilinta, Effient, Eliquis, Lovenox, Plavix, Pletal, Pradaxa, Ticilid, Xarelto or Coumadin, let the doctor's office know.    Please hold any GLP-1 medications prior to the procedure: Dulaglutide Trulicity(hold week prior), Exenatide Byetta (hold the morning of procedure), Semaglutide Ozempic (hold week prior), Liraglutide Victoza, Saxenda(hold week prior), Lixisenatide Adlyxin (hold the morning of procedure), Semaglutide Rybelsus (hold the morning of procedure), Tirzepatide Mounjaro (hold week prior)     DON'T: On the morning of the test do not take insulin or pills for diabetes.     DO: On the morning of the test, do take any pills for blood pressure, heart, anti-rejection and or seizures with a small sip of water. Bring any inhalers with you.    To have a good prep, you must follow these instructions - please do not use the directions from the pharmacy.    The doctor will send a prescription for the SUPREP.      The Day Before the test:    You can only drink CLEAR LIQUIDS the whole day before your test.  You can't eat any food for the whole day.    You CAN have:  Water, Coffee or decaf coffee (no milk or cream)  Tea  Soft drinks - regular and sugar free  Jello (green or yellow)  Apple Juice, white grape  juice, white cranberry juice  Gatorade, Power Aid, Crystal Light, Harris Aid  Lemonade and Limeade  Bouillon, clear soup  Snowball, popsicles  YOU CAN'T DRINK ANYTHING RED, PURPLE ORANGE OR BLUE   YOU CAN'T DRINK ALCOHOL  ONLY DRINK WHAT IS ON THE LIST      At 5 pm the night before your test:    Pour the 1st bottle of SUPREP into the cup provided in the box. Add water to the line on the cup and mix well.  Drink the whole cup and then drink 2 more full cups of water over 1 hour.  This can be easier to drink if it is cold. You can mix it 20 minutes ahead of time and place in the refrigerator before you drink it.  You must drink it within 30-45 minutes of mixing it.  Do NOT pour the drink over ice.  You can drink it with a straw.    The Day of the test - We will call you 2 days before your test to tell you what time to get there.    5 hours before you come to the hospital (this may be in the middle of the night)  Pour the 2nd bottle of SUPREP into the cup provided in the box. Add water to the line on the cup and mix well.  Drink the whole cup and then drink 2 more full cups of water over 1 hour.  It might be easier to drink if it is cold. You can mix it 20 minutes ahead of time and place in the refrigerator before you drink it.  You must drink it within 30-45 minutes of mixing it.  Do NOT pour the drink over ice.  You can drink it with a straw.    YOU CAN'T EAT OR DRINK ANYTHING ELSE ONCE YOU FINISH THE PREP    Leave all valuables and jewelry at home. You will be at the hospital for 2-4 hours.    Call the Endoscopy department at [phone] with any questions about your procedure.

## 2023-09-14 ENCOUNTER — TELEPHONE (OUTPATIENT)
Dept: GASTROENTEROLOGY | Facility: CLINIC | Age: 83
End: 2023-09-14
Payer: MEDICARE

## 2023-09-14 ENCOUNTER — TELEPHONE (OUTPATIENT)
Dept: ENDOSCOPY | Facility: HOSPITAL | Age: 83
End: 2023-09-14
Payer: MEDICARE

## 2023-09-14 NOTE — TELEPHONE ENCOUNTER
Spoke with patient about arrival time @ 0900.   EGD/Colon    Prep instructions reviewed: the day before the procedure, follow a clear liquid diet all day, then start the first 1/2 of prep at 5pm and take 2nd 1/2 of prep @ 0400.  Pt must be completely NPO when prep completed @ 0600.    Suprep instrictions/arrival time sent to portal.          Medications: Do not take Insulin or oral diabetic medications the day of the procedure.  Take as prescribed: heart, seizure and blood pressure medication in the morning with a sip of water (less than an ounce).  Take any breathing medications and bring inhalers to hospital with you Leave all valuables and jewelry at home.     Wear comfortable clothes to procedure to change into hospital gown You cannot drive for 24 hours after your procedure because you will receive sedation for your procedure to make you comfortable.  A ride must be provided at discharge.

## 2023-09-14 NOTE — TELEPHONE ENCOUNTER
Left messages instructing patient to call dept @ [phone] between 8am-3pm.    Arrival time to be given @ 0900  EGD/Colon (Suprep)  (Inst/arrival time sent via [facility] portal)

## 2023-09-18 ENCOUNTER — ANESTHESIA (OUTPATIENT)
Dept: ENDOSCOPY | Facility: HOSPITAL | Age: 83
End: 2023-09-18
Payer: MEDICARE

## 2023-09-18 ENCOUNTER — ANESTHESIA EVENT (OUTPATIENT)
Dept: ENDOSCOPY | Facility: HOSPITAL | Age: 83
End: 2023-09-18
Payer: MEDICARE

## 2023-09-18 ENCOUNTER — HOSPITAL ENCOUNTER (OUTPATIENT)
Facility: HOSPITAL | Age: 83
Discharge: HOME | End: 2023-09-18
Attending: INTERNAL MEDICINE | Admitting: INTERNAL MEDICINE
Payer: MEDICARE

## 2023-09-18 VITALS
OXYGEN SATURATION: 99 % | WEIGHT: 175 LBS | HEIGHT: 66 IN | SYSTOLIC BLOOD PRESSURE: 135 MMHG | RESPIRATION RATE: 18 BRPM | BODY MASS INDEX: 28.12 KG/M2 | HEART RATE: 75 BPM | TEMPERATURE: 98 F | DIASTOLIC BLOOD PRESSURE: 61 MMHG

## 2023-09-18 DIAGNOSIS — D50.0 ANEMIA DUE TO GASTROINTESTINAL BLOOD LOSS: ICD-10-CM

## 2023-09-18 DIAGNOSIS — K55.20 ANGIODYSPLASIA OF GASTROINTESTINAL TRACT: ICD-10-CM

## 2023-09-18 DIAGNOSIS — D50.0 IRON DEFICIENCY ANEMIA DUE TO CHRONIC BLOOD LOSS: Primary | ICD-10-CM

## 2023-09-18 LAB — POCT GLUCOSE: 106 MG/DL (ref 70–110)

## 2023-09-18 PROCEDURE — 25000003 PHARM REV CODE 250: Performed by: INTERNAL MEDICINE

## 2023-09-18 PROCEDURE — D9220A PRA ANESTHESIA: ICD-10-PCS | Mod: CRNA,,, | Performed by: NURSE ANESTHETIST, CERTIFIED REGISTERED

## 2023-09-18 PROCEDURE — 27202087 HC PROBE, APC: Performed by: INTERNAL MEDICINE

## 2023-09-18 PROCEDURE — 63600175 PHARM REV CODE 636 W HCPCS: Performed by: NURSE ANESTHETIST, CERTIFIED REGISTERED

## 2023-09-18 PROCEDURE — 45378 DIAGNOSTIC COLONOSCOPY: CPT | Performed by: INTERNAL MEDICINE

## 2023-09-18 PROCEDURE — 25000003 PHARM REV CODE 250: Performed by: NURSE ANESTHETIST, CERTIFIED REGISTERED

## 2023-09-18 PROCEDURE — D9220A PRA ANESTHESIA: ICD-10-PCS | Mod: ANES,,, | Performed by: ANESTHESIOLOGY

## 2023-09-18 PROCEDURE — 37000008 HC ANESTHESIA 1ST 15 MINUTES: Performed by: INTERNAL MEDICINE

## 2023-09-18 PROCEDURE — D9220A PRA ANESTHESIA: Mod: CRNA,,, | Performed by: NURSE ANESTHETIST, CERTIFIED REGISTERED

## 2023-09-18 PROCEDURE — 37000009 HC ANESTHESIA EA ADD 15 MINS: Performed by: INTERNAL MEDICINE

## 2023-09-18 PROCEDURE — D9220A PRA ANESTHESIA: Mod: ANES,,, | Performed by: ANESTHESIOLOGY

## 2023-09-18 PROCEDURE — 43270 EGD LESION ABLATION: CPT | Performed by: INTERNAL MEDICINE

## 2023-09-18 RX ORDER — LIDOCAINE HYDROCHLORIDE 20 MG/ML
INJECTION, SOLUTION EPIDURAL; INFILTRATION; INTRACAUDAL; PERINEURAL
Status: DISCONTINUED | OUTPATIENT
Start: 2023-09-18 | End: 2023-09-18

## 2023-09-18 RX ORDER — PROPOFOL 10 MG/ML
VIAL (ML) INTRAVENOUS
Status: DISCONTINUED | OUTPATIENT
Start: 2023-09-18 | End: 2023-09-18

## 2023-09-18 RX ORDER — FENTANYL CITRATE 50 UG/ML
INJECTION, SOLUTION INTRAMUSCULAR; INTRAVENOUS
Status: DISCONTINUED | OUTPATIENT
Start: 2023-09-18 | End: 2023-09-18

## 2023-09-18 RX ORDER — PROPOFOL 10 MG/ML
VIAL (ML) INTRAVENOUS CONTINUOUS PRN
Status: DISCONTINUED | OUTPATIENT
Start: 2023-09-18 | End: 2023-09-18

## 2023-09-18 RX ORDER — SODIUM CHLORIDE 9 MG/ML
INJECTION, SOLUTION INTRAVENOUS CONTINUOUS
Status: DISCONTINUED | OUTPATIENT
Start: 2023-09-18 | End: 2023-09-18 | Stop reason: HOSPADM

## 2023-09-18 RX ORDER — SODIUM CHLORIDE 0.9 % (FLUSH) 0.9 %
10 SYRINGE (ML) INJECTION
Status: DISCONTINUED | OUTPATIENT
Start: 2023-09-18 | End: 2023-09-18 | Stop reason: HOSPADM

## 2023-09-18 RX ORDER — SODIUM CHLORIDE 9 MG/ML
INJECTION, SOLUTION INTRAVENOUS CONTINUOUS
Status: CANCELLED | OUTPATIENT
Start: 2023-09-18

## 2023-09-18 RX ADMIN — PROPOFOL 100 MG: 10 INJECTION, EMULSION INTRAVENOUS at 11:09

## 2023-09-18 RX ADMIN — SODIUM CHLORIDE: 0.9 INJECTION, SOLUTION INTRAVENOUS at 11:09

## 2023-09-18 RX ADMIN — PROPOFOL 150 MCG/KG/MIN: 10 INJECTION, EMULSION INTRAVENOUS at 11:09

## 2023-09-18 RX ADMIN — LIDOCAINE HYDROCHLORIDE 100 MG: 20 INJECTION, SOLUTION EPIDURAL; INFILTRATION; INTRACAUDAL; PERINEURAL at 11:09

## 2023-09-18 RX ADMIN — SODIUM CHLORIDE: 9 INJECTION, SOLUTION INTRAVENOUS at 10:09

## 2023-09-18 RX ADMIN — FENTANYL CITRATE 50 MCG: 50 INJECTION, SOLUTION INTRAMUSCULAR; INTRAVENOUS at 11:09

## 2023-09-18 NOTE — PROVATION PATIENT INSTRUCTIONS
Discharge Summary/Instructions after an Endoscopic Procedure  Patient Name: Ning Rodriguez  Patient MRN: [id number]  Patient YOB: 1940  Monday, September 18, 2023  Dov Michele MD  Dear patient,  As a result of recent federal legislation (The Federal Cures Act), you may   receive lab or pathology results from your procedure in your [facility]   account before your physician is able to contact you. Your physician or   their representative will relay the results to you with their   recommendations at their soonest availability.  Thank you,  Your health is very important to us during the Covid Crisis. Following your   procedure today, you will receive a daily text for 2 weeks asking about   signs or symptoms of Covid 19.  Please respond to this text when you   receive it so we can follow up and keep you as safe as possible.   RESTRICTIONS:  During your procedure today, you received medications for sedation.  These   medications may affect your judgment, balance and coordination.  Therefore,   for 24 hours, you have the following restrictions:   - DO NOT drive a car, operate machinery, make legal/financial decisions,   sign important papers or drink alcohol.    ACTIVITY:  Today: no heavy lifting, straining or running due to procedural   sedation/anesthesia.  The following day: return to full activity including work.  DIET:  Eat and drink normally unless instructed otherwise.     TREATMENT FOR COMMON SIDE EFFECTS:  - Mild abdominal pain, nausea, belching, bloating or excessive gas:  rest,   eat lightly and use a heating pad.  - Sore Throat: treat with throat lozenges and/or gargle with warm salt   water.  - Because air was used during the procedure, expelling large amounts of air   from your rectum or belching is normal.  - If a bowel prep was taken, you may not have a bowel movement for 1-3 days.    This is normal.  SYMPTOMS TO WATCH FOR AND REPORT TO YOUR PHYSICIAN:  1. Abdominal pain or bloating, other than gas  cramps.  2. Chest pain.  3. Back pain.  4. Signs of infection such as: chills or fever occurring within 24 hours   after the procedure.  5. Rectal bleeding, which would show as bright red, maroon, or black stools.   (A tablespoon of blood from the rectum is not serious, especially if   hemorrhoids are present.)  6. Vomiting.  7. Weakness or dizziness.  GO DIRECTLY TO THE NEAREST EMERGENCY ROOM IF YOU HAVE ANY OF THE FOLLOWING:      Difficulty breathing              Chills and/or fever over 101 F   Persistent vomiting and/or vomiting blood   Severe abdominal pain   Severe chest pain   Black, tarry stools   Bleeding- more than one tablespoon   Any other symptom or condition that you feel may need urgent attention  Your doctor recommends these additional instructions:  If any biopsies were taken, your doctors clinic will contact you in 1 to 2   weeks with any results.  - Plan management of a new diagnosis of cirrhosis with PHG as next step   including BB therapy   - Discharge to home  - Resume previous diet and medications  - Condition stable   - The signs and symptoms of potential delayed complications were discussed   with the patient. If signs or symptoms of these complications develop, call   the [facility] at [phone].   - Return to normal activities tomorrow.  Written discharge instructions were   provided to the patient.  For questions, problems or results please call your physician - Dov Michele MD.  EMERGENCY PHONE NUMBER: [phone],  LAB RESULTS: [phone]  IF A COMPLICATION OR EMERGENCY SITUATION ARISES AND YOU ARE UNABLE TO REACH   YOUR PHYSICIAN - GO DIRECTLY TO THE EMERGENCY ROOM.  MD Dov Mason MD  9/18/2023 12:10:19 PM  This report has been verified and signed electronically.  Dear patient,  As a result of recent federal legislation (The Federal Cures Act), you may   receive lab or pathology results from your procedure in your [facility]   account  before your physician is able to contact you. Your physician or   their representative will relay the results to you with their   recommendations at their soonest availability.  Thank you,  PROVATION

## 2023-09-18 NOTE — TRANSFER OF CARE
"Anesthesia Transfer of Care Note    Patient: Ning Rodriguez    Procedure(s) Performed: Procedure(s) (LRB):  EGD (ESOPHAGOGASTRODUODENOSCOPY) (N/A)  COLONOSCOPY (N/A)    Patient location: GI    Anesthesia Type: general    Transport from OR: Transported from OR on room air with adequate spontaneous ventilation    Post pain: adequate analgesia    Post assessment: no apparent anesthetic complications and tolerated procedure well    Post vital signs: stable    Level of consciousness: awake, alert and oriented    Nausea/Vomiting: no nausea/vomiting    Complications: none    Transfer of care protocol was followed      Last vitals:   Visit Vitals  BP (!) 154/69   Pulse 99   Temp 36.1 °C (97 °F)   Resp 18   Ht 5' 6" (1.676 m)   Wt 79.4 kg (175 lb)   SpO2 97%   Breastfeeding No   BMI 28.25 kg/m²     "

## 2023-09-18 NOTE — ANESTHESIA PREPROCEDURE EVALUATION
09/18/2023     Ning Rodriguez is a 83 y.o., female.    Past Medical History:   Diagnosis Date    Allergy     Arthritis     Cataract     Diabetes mellitus, type 2     Hypertension     Thyroid disease      Past Surgical History:   Procedure Laterality Date    CATARACT EXTRACTION W/  INTRAOCULAR LENS IMPLANT Right 5/16/2022    Procedure: EXTRACTION, CATARACT, WITH IOL INSERTION;  Surgeon: Tami Zarate MD;  Location: [facility] OR;  Service: Ophthalmology;  Laterality: Right;  ORA ONLY FOR CYL MEASUREMENT/TORIC DECISION    CATARACT EXTRACTION W/  INTRAOCULAR LENS IMPLANT Left 6/20/2022    Procedure: EXTRACTION, CATARACT, WITH IOL INSERTION;  Surgeon: Tami Zarate MD;  Location: [facility] OR;  Service: Ophthalmology;  Laterality: Left;  ORA ONLY FOR CYL MEASUREMENT/TORIC DECISION    HEMORRHOID SURGERY             Pre-op Assessment    I have reviewed the Patient Summary Reports.     I have reviewed the Nursing Notes. I have reviewed the NPO Status.      Review of Systems  Anesthesia Hx:  History of prior surgery of interest to airway management or planning:  Denies Personal Hx of Anesthesia complications.   Cardiovascular:   Hypertension    Pulmonary:  Pulmonary Normal    Endocrine:   Diabetes        Physical Exam  General: Well nourished    Airway:  Mallampati: II   Mouth Opening: Normal  Neck ROM: Normal ROM        Anesthesia Plan  Type of Anesthesia, risks & benefits discussed:    Anesthesia Type: Gen Natural Airway  Informed Consent: Informed consent signed with the Patient and all parties understand the risks and agree with anesthesia plan.  All questions answered.   ASA Score: 2    Ready For Surgery From Anesthesia Perspective.     .

## 2023-09-18 NOTE — H&P
[facility] Gastroenterology     CC: Anemia     HPI 83 y.o. female with a new, unexplained, iron deficiency anemia      Patient was found to have low platelets on labwork ~2 years ago, and recent labwork showed pancytopenia. She denies any symptoms including weakness, lightheadedness, shortness of breath, or decrease in energy. She had recently seen Dr. Mensah for her pancytopenia, and he noted increase in light chains although immunofixation was normal.     Was found to have a new AUDLEIA with low iron and high TIBC.      She states that she had a colonoscopy >10 years ago, and was told that her results were completely normal. Denies hematemesis, hemoptysis, abdominal pain, hematuria, hematochezia, or melena. She has been taking PO iron for roughly 2 weeks, and has noticed mildly darker stools, although not black stools.     Past Medical History:   Hypertension   Type II DM   Thyroid disease      Physical Examination  General appearance: alert, cooperative, no distress  Abdomen: soft, non-tender; bowel sounds normoactive; no organomegaly  Extremities: extremities symmetric; no clubbing, cyanosis, or edema     Assessment:   82 yo female presents for evaluation of a new diagnosis of iron deficiency anemia which is unexplained.      Plan:  - EGD/Colonoscopy today    Dov Michele MD   [address], [address]   [place] [address] [phone]

## 2023-09-18 NOTE — ANESTHESIA POSTPROCEDURE EVALUATION
Anesthesia Post Evaluation    Patient: Ning Rodriguez    Procedure(s) Performed: Procedure(s) (LRB):  EGD (ESOPHAGOGASTRODUODENOSCOPY) (N/A)  COLONOSCOPY (N/A)    Final Anesthesia Type: general      Patient location during evaluation: PACU  Patient participation: Yes- Able to Participate  Level of consciousness: awake and alert  Post-procedure vital signs: reviewed and stable  Pain management: adequate  Airway patency: patent    PONV status at discharge: No PONV  Anesthetic complications: no      Cardiovascular status: blood pressure returned to baseline  Respiratory status: unassisted  Hydration status: euvolemic            Vitals Value Taken Time   /61 09/18/23 1221   Temp 36.5 °C (97.7 °F) 09/18/23 1151   Pulse 75 09/18/23 1221   Resp 18 09/18/23 1221   SpO2 99 % 09/18/23 1221         No case tracking events are documented in the log.      Pain/Maris Score: Maris Score: 10 (9/18/2023 12:21 PM)

## 2023-09-18 NOTE — PROVATION PATIENT INSTRUCTIONS
Discharge Summary/Instructions after an Endoscopic Procedure  Patient Name: Ning Rodriguez  Patient MRN: [id number]  Patient YOB: 1940  Monday, September 18, 2023  Dov Michele MD  Dear patient,  As a result of recent federal legislation (The Federal Cures Act), you may   receive lab or pathology results from your procedure in your [facility]   account before your physician is able to contact you. Your physician or   their representative will relay the results to you with their   recommendations at their soonest availability.  Thank you,  Your health is very important to us during the Covid Crisis. Following your   procedure today, you will receive a daily text for 2 weeks asking about   signs or symptoms of Covid 19.  Please respond to this text when you   receive it so we can follow up and keep you as safe as possible.   RESTRICTIONS:  During your procedure today, you received medications for sedation.  These   medications may affect your judgment, balance and coordination.  Therefore,   for 24 hours, you have the following restrictions:   - DO NOT drive a car, operate machinery, make legal/financial decisions,   sign important papers or drink alcohol.    ACTIVITY:  Today: no heavy lifting, straining or running due to procedural   sedation/anesthesia.  The following day: return to full activity including work.  DIET:  Eat and drink normally unless instructed otherwise.     TREATMENT FOR COMMON SIDE EFFECTS:  - Mild abdominal pain, nausea, belching, bloating or excessive gas:  rest,   eat lightly and use a heating pad.  - Sore Throat: treat with throat lozenges and/or gargle with warm salt   water.  - Because air was used during the procedure, expelling large amounts of air   from your rectum or belching is normal.  - If a bowel prep was taken, you may not have a bowel movement for 1-3 days.    This is normal.  SYMPTOMS TO WATCH FOR AND REPORT TO YOUR PHYSICIAN:  1. Abdominal pain or bloating, other than gas  cramps.  2. Chest pain.  3. Back pain.  4. Signs of infection such as: chills or fever occurring within 24 hours   after the procedure.  5. Rectal bleeding, which would show as bright red, maroon, or black stools.   (A tablespoon of blood from the rectum is not serious, especially if   hemorrhoids are present.)  6. Vomiting.  7. Weakness or dizziness.  GO DIRECTLY TO THE NEAREST EMERGENCY ROOM IF YOU HAVE ANY OF THE FOLLOWING:      Difficulty breathing              Chills and/or fever over 101 F   Persistent vomiting and/or vomiting blood   Severe abdominal pain   Severe chest pain   Black, tarry stools   Bleeding- more than one tablespoon   Any other symptom or condition that you feel may need urgent attention  Your doctor recommends these additional instructions:  If any biopsies were taken, your doctors clinic will contact you in 1 to 2   weeks with any results.  Hepatology evaluation as next step   - Discharge patient to home.  For questions, problems or results please call your physician - Dov Michele MD.  EMERGENCY PHONE NUMBER: [phone],  LAB RESULTS: [phone]  IF A COMPLICATION OR EMERGENCY SITUATION ARISES AND YOU ARE UNABLE TO REACH   YOUR PHYSICIAN - GO DIRECTLY TO THE EMERGENCY ROOM.  MD Dov Mason MD  9/18/2023 12:06:19 PM  This report has been verified and signed electronically.  Dear patient,  As a result of recent federal legislation (The Federal Cures Act), you may   receive lab or pathology results from your procedure in your [facility]   account before your physician is able to contact you. Your physician or   their representative will relay the results to you with their   recommendations at their soonest availability.  Thank you,  PROVATION

## 2023-09-19 ENCOUNTER — LAB VISIT (OUTPATIENT)
Dept: LAB | Facility: HOSPITAL | Age: 83
End: 2023-09-19
Attending: INTERNAL MEDICINE
Payer: MEDICARE

## 2023-09-19 ENCOUNTER — OFFICE VISIT (OUTPATIENT)
Dept: HEPATOLOGY | Facility: CLINIC | Age: 83
End: 2023-09-19
Payer: MEDICARE

## 2023-09-19 VITALS
OXYGEN SATURATION: 97 % | BODY MASS INDEX: 28.14 KG/M2 | RESPIRATION RATE: 18 BRPM | TEMPERATURE: 98 F | WEIGHT: 175.06 LBS | HEIGHT: 66 IN | DIASTOLIC BLOOD PRESSURE: 60 MMHG | SYSTOLIC BLOOD PRESSURE: 128 MMHG | HEART RATE: 73 BPM

## 2023-09-19 DIAGNOSIS — K31.89 PORTAL HYPERTENSIVE GASTROPATHY: ICD-10-CM

## 2023-09-19 DIAGNOSIS — Z87.19 HISTORY OF FATTY INFILTRATION OF LIVER: ICD-10-CM

## 2023-09-19 DIAGNOSIS — I85.00 ESOPHAGEAL VARICES WITHOUT BLEEDING, UNSPECIFIED ESOPHAGEAL VARICES TYPE: ICD-10-CM

## 2023-09-19 DIAGNOSIS — K76.6 PORTAL HYPERTENSIVE GASTROPATHY: ICD-10-CM

## 2023-09-19 DIAGNOSIS — K74.02 HEPATIC FIBROSIS, ADVANCED FIBROSIS: ICD-10-CM

## 2023-09-19 DIAGNOSIS — I86.4 GASTRIC VARICES WITHOUT BLEEDING: ICD-10-CM

## 2023-09-19 LAB
AFP SERPL-MCNC: 5.5 NG/ML (ref 0–8.4)
ALBUMIN SERPL BCP-MCNC: 3.8 G/DL (ref 3.5–5.2)
ALP SERPL-CCNC: 95 U/L (ref 55–135)
ALT SERPL W/O P-5'-P-CCNC: 34 U/L (ref 10–44)
AST SERPL-CCNC: 39 U/L (ref 10–40)
BILIRUB DIRECT SERPL-MCNC: 0.3 MG/DL (ref 0.1–0.3)
BILIRUB SERPL-MCNC: 0.6 MG/DL (ref 0.1–1)
HAV IGG SER QL IA: REACTIVE
HBV CORE AB SERPL QL IA: NORMAL
HBV SURFACE AG SERPL QL IA: NORMAL
HIV 1+2 AB+HIV1 P24 AG SERPL QL IA: NORMAL
INR PPP: 1.1 (ref 0.8–1.2)
PROT SERPL-MCNC: 7.2 G/DL (ref 6–8.4)
PROTHROMBIN TIME: 11.9 SEC (ref 9–12.5)

## 2023-09-19 PROCEDURE — 86790 VIRUS ANTIBODY NOS: CPT | Performed by: INTERNAL MEDICINE

## 2023-09-19 PROCEDURE — 3078F PR MOST RECENT DIASTOLIC BLOOD PRESSURE < 80 MM HG: ICD-10-PCS | Mod: CPTII,S$GLB,, | Performed by: INTERNAL MEDICINE

## 2023-09-19 PROCEDURE — 80076 HEPATIC FUNCTION PANEL: CPT | Performed by: INTERNAL MEDICINE

## 2023-09-19 PROCEDURE — 1157F PR ADVANCE CARE PLAN OR EQUIV PRESENT IN MEDICAL RECORD: ICD-10-PCS | Mod: CPTII,S$GLB,, | Performed by: INTERNAL MEDICINE

## 2023-09-19 PROCEDURE — 99999 PR PBB SHADOW E&M-EST. PATIENT-LVL IV: CPT | Mod: PBBFAC,,, | Performed by: INTERNAL MEDICINE

## 2023-09-19 PROCEDURE — 87389 HIV-1 AG W/HIV-1&-2 AB AG IA: CPT | Performed by: INTERNAL MEDICINE

## 2023-09-19 PROCEDURE — 1157F ADVNC CARE PLAN IN RCRD: CPT | Mod: CPTII,S$GLB,, | Performed by: INTERNAL MEDICINE

## 2023-09-19 PROCEDURE — 1126F AMNT PAIN NOTED NONE PRSNT: CPT | Mod: CPTII,S$GLB,, | Performed by: INTERNAL MEDICINE

## 2023-09-19 PROCEDURE — 86706 HEP B SURFACE ANTIBODY: CPT | Performed by: INTERNAL MEDICINE

## 2023-09-19 PROCEDURE — 1159F MED LIST DOCD IN RCRD: CPT | Mod: CPTII,S$GLB,, | Performed by: INTERNAL MEDICINE

## 2023-09-19 PROCEDURE — 1160F RVW MEDS BY RX/DR IN RCRD: CPT | Mod: CPTII,S$GLB,, | Performed by: INTERNAL MEDICINE

## 2023-09-19 PROCEDURE — 3078F DIAST BP <80 MM HG: CPT | Mod: CPTII,S$GLB,, | Performed by: INTERNAL MEDICINE

## 2023-09-19 PROCEDURE — 3074F PR MOST RECENT SYSTOLIC BLOOD PRESSURE < 130 MM HG: ICD-10-PCS | Mod: CPTII,S$GLB,, | Performed by: INTERNAL MEDICINE

## 2023-09-19 PROCEDURE — 3288F FALL RISK ASSESSMENT DOCD: CPT | Mod: CPTII,S$GLB,, | Performed by: INTERNAL MEDICINE

## 2023-09-19 PROCEDURE — 1100F PTFALLS ASSESS-DOCD GE2>/YR: CPT | Mod: CPTII,S$GLB,, | Performed by: INTERNAL MEDICINE

## 2023-09-19 PROCEDURE — 99999 PR PBB SHADOW E&M-EST. PATIENT-LVL IV: ICD-10-PCS | Mod: PBBFAC,,, | Performed by: INTERNAL MEDICINE

## 2023-09-19 PROCEDURE — 3288F PR FALLS RISK ASSESSMENT DOCUMENTED: ICD-10-PCS | Mod: CPTII,S$GLB,, | Performed by: INTERNAL MEDICINE

## 2023-09-19 PROCEDURE — 87340 HEPATITIS B SURFACE AG IA: CPT | Performed by: INTERNAL MEDICINE

## 2023-09-19 PROCEDURE — 1100F PR PT FALLS ASSESS DOC 2+ FALLS/FALL W/INJURY/YR: ICD-10-PCS | Mod: CPTII,S$GLB,, | Performed by: INTERNAL MEDICINE

## 2023-09-19 PROCEDURE — 86704 HEP B CORE ANTIBODY TOTAL: CPT | Performed by: INTERNAL MEDICINE

## 2023-09-19 PROCEDURE — 99214 PR OFFICE/OUTPT VISIT, EST, LEVL IV, 30-39 MIN: ICD-10-PCS | Mod: S$GLB,,, | Performed by: INTERNAL MEDICINE

## 2023-09-19 PROCEDURE — 1159F PR MEDICATION LIST DOCUMENTED IN MEDICAL RECORD: ICD-10-PCS | Mod: CPTII,S$GLB,, | Performed by: INTERNAL MEDICINE

## 2023-09-19 PROCEDURE — 99214 OFFICE O/P EST MOD 30 MIN: CPT | Mod: S$GLB,,, | Performed by: INTERNAL MEDICINE

## 2023-09-19 PROCEDURE — 1126F PR PAIN SEVERITY QUANTIFIED, NO PAIN PRESENT: ICD-10-PCS | Mod: CPTII,S$GLB,, | Performed by: INTERNAL MEDICINE

## 2023-09-19 PROCEDURE — 86235 NUCLEAR ANTIGEN ANTIBODY: CPT | Mod: 59 | Performed by: INTERNAL MEDICINE

## 2023-09-19 PROCEDURE — 82105 ALPHA-FETOPROTEIN SERUM: CPT | Performed by: INTERNAL MEDICINE

## 2023-09-19 PROCEDURE — 1160F PR REVIEW ALL MEDS BY PRESCRIBER/CLIN PHARMACIST DOCUMENTED: ICD-10-PCS | Mod: CPTII,S$GLB,, | Performed by: INTERNAL MEDICINE

## 2023-09-19 PROCEDURE — 86039 ANTINUCLEAR ANTIBODIES (ANA): CPT | Performed by: INTERNAL MEDICINE

## 2023-09-19 PROCEDURE — 86038 ANTINUCLEAR ANTIBODIES: CPT | Performed by: INTERNAL MEDICINE

## 2023-09-19 PROCEDURE — 3074F SYST BP LT 130 MM HG: CPT | Mod: CPTII,S$GLB,, | Performed by: INTERNAL MEDICINE

## 2023-09-19 PROCEDURE — 85610 PROTHROMBIN TIME: CPT | Performed by: INTERNAL MEDICINE

## 2023-09-19 RX ORDER — CARVEDILOL 6.25 MG/1
6.25 TABLET ORAL 2 TIMES DAILY WITH MEALS
Qty: 60 TABLET | Refills: 11 | Status: SHIPPED | OUTPATIENT
Start: 2023-09-19 | End: 2024-09-18

## 2023-09-19 RX ORDER — TOBRAMYCIN 3 MG/ML
SOLUTION/ DROPS OPHTHALMIC
COMMUNITY
Start: 2023-07-22

## 2023-09-19 NOTE — PROGRESS NOTES
Hepatology Consult Note    Referring provider: Aaareferral Self  PCP: Pam Soliman MD    Chief complaint: new diagnosis of esophageal and gastric varices, PHG    HPI:  Ning Rodriguez is a 83 y.o. female with history of fatty liver, DM Type 2, HTN, who was referred to Hepatology Clinic for evaluation of new diagnosis of varices during endoscopy. The patient is accompanied by , Dr. Rodriguez, retired nephrologist, who helps provide HPI. The patient notes no prior diagnosis of cirrhosis until yesterday when she was diagnosis with esophageal, gastric varices and portal hypertensive gastropathy during endoscopy done for evaluation of anemia. About 4-5 years ago, she was diagnosed with elevated liver enzymes and fatty liver. However, liver enzymes subsequently normalized with weight loss and exercise.     The patient denies prior history of EtOH abuse, illicit drug use, blood transfusions, prior tattoos. Denies history of HLD, BOLIVAR, PCOS. Denies family history of liver disease or liver cancer (brother with history of liver cancer, but don't think it was a primary cancer).     Of note, she thinks she may have undiagnosed Sjogren's disease given terrible issues with dry mouth and vaginal dryness.     The patient denies signs/symptoms of decompensated liver disease, including no recent abdominal distension, encephalopathy, jaundice, gross GI bleeding. The patient denies prior evaluation by hepatologist, liver biopsy, paracentesis.    No prior hepatobiliary surgeries.    Past Medical History:   Diagnosis Date    Allergy     Arthritis     Cataract     Diabetes mellitus, type 2     Hypertension     Thyroid disease        Past Surgical History:   Procedure Laterality Date    CATARACT EXTRACTION W/  INTRAOCULAR LENS IMPLANT Right 05/16/2022    Procedure: EXTRACTION, CATARACT, WITH IOL INSERTION;  Surgeon: Tami Zaraet MD;  [facility]: [facility];  Service: Ophthalmology;  Laterality: Right;  ORA ONLY FOR CYL  MEASUREMENT/TORIC DECISION    CATARACT EXTRACTION W/  INTRAOCULAR LENS IMPLANT Left 06/20/2022    Procedure: EXTRACTION, CATARACT, WITH IOL INSERTION;  Surgeon: Tami Zarate MD;  Location: [facility] OR;  Service: Ophthalmology;  Laterality: Left;  ORA ONLY FOR CYL MEASUREMENT/TORIC DECISION    COLONOSCOPY      COLONOSCOPY N/A 9/18/2023    Procedure: COLONOSCOPY;  Surgeon: Dov Michele MD;  Location: [facility] ENDO;  Service: Endoscopy;  Laterality: N/A;    ESOPHAGOGASTRODUODENOSCOPY N/A 9/18/2023    Procedure: EGD (ESOPHAGOGASTRODUODENOSCOPY);  Surgeon: Dov Michele MD;  Location: [facility] ENDO;  Service: Endoscopy;  Laterality: N/A;    HEMORRHOID SURGERY         No family history on file.    Social History     Tobacco Use    Smoking status: Former    Smokeless tobacco: Never   Substance Use Topics    Alcohol use: No     Alcohol/week: 0.0 standard drinks of alcohol    Drug use: No       Current Outpatient Medications   Medication Sig Dispense Refill    augmented betamethasone (DIPROLENE) 0.05 % gel Apply topically to the affected area 4 to 5 times a day while having symptoms 50 g 0    blood sugar diagnostic (CONTOUR TEST STRIPS) Strp Check blood sugar twice weekly plus as directed by  each 6    celecoxib (CELEBREX) 100 MG capsule take 1 Capsule by mouth  twice daily as needed 180 capsule 1    flu vac 2021 [id number],PF, (FLUAD QUAD 2021-22,65Y UP,,PF,) 60 mcg (15 mcg x 4)/0.5 mL Syrg inject by [facility] 0.5 mL 0    glipiZIDE (GLUCOTROL) 5 MG tablet Take 1 tablet (5 mg total) by mouth 2 (two) times daily. 180 tablet 1    glipiZIDE (GLUCOTROL) 5 MG tablet Take 1 tablet (5 mg total) by mouth 2 (two) times daily. 180 tablet 1    glipiZIDE (GLUCOTROL) 5 MG tablet Take 1 tablet (5 mg total) by mouth once daily. 180 tablet 1    hydrALAZINE (APRESOLINE) 10 MG tablet Take 1 tablet by mouth 3 times a day 90 tablet 6    levothyroxine (SYNTHROID) 25 MCG tablet Take 1 tablet (25 mcg total) by mouth before breakfast. 30 tablet  "5    omeprazole (PRILOSEC) 20 MG capsule Take 1 capsule by mouth daily 90 capsule 3    promethazine-dextromethorphan (PROMETHAZINE-DM) 6.25-15 mg/5 mL Syrp Take 5 mLs by mouth nightly as needed (cough). 180 mL 0    semaglutide (RYBELSUS) 3 mg tablet Take 1 tablet (3 mg total) by mouth once daily. 90 tablet 4    sodium,potassium,mag sulfates (SUPREP BOWEL PREP KIT) 17.5-3.13-1.6 gram SolR use as directed 354 mL 0    tobramycin sulfate 0.3% (TOBREX) 0.3 % ophthalmic solution SMARTSI-2 Drop(s) In Eye(s) Every 4 Hours      triamcinolone (NASACORT) 55 mcg nasal inhaler 2 sprays by Nasal route once daily. 10.8 g 0    carvediloL (COREG) 6.25 MG tablet Take 1 tablet (6.25 mg total) by mouth 2 (two) times daily with meals. 60 tablet 11    cevimeline (EVOXAC) 30 mg capsule Take 1 capsule (30 mg total) by mouth 3 (three) times daily. 90 capsule 11     Current Facility-Administered Medications   Medication Dose Route Frequency Provider Last Rate Last Admin    sodium chloride 0.9% flush 10 mL  10 mL Intravenous PRN Tami Zarate MD           Review of patient's allergies indicates:  No Known Allergies         Vitals:    23 1356   BP: 128/60   Pulse: 73   Resp: 18   Temp: 98.4 °F (36.9 °C)   TempSrc: Oral   SpO2: 97%   Weight: 79.4 kg (175 lb 0.7 oz)   Height: 5' 6" (1.676 m)   Body mass index is 28.25 kg/m².      Physical Exam  Constitutional:       General: She is not in acute distress.  Eyes:      General: No scleral icterus.  Cardiovascular:      Rate and Rhythm: Normal rate and regular rhythm.   Pulmonary:      Effort: Pulmonary effort is normal.      Breath sounds: Normal breath sounds.   Abdominal:      General: Abdomen is flat. There is no distension.      Palpations: Abdomen is soft. There is no fluid wave, hepatomegaly or splenomegaly.      Tenderness: There is no abdominal tenderness.   Musculoskeletal:      Right lower leg: No edema.      Left lower leg: No edema.   Skin:     General: Skin is warm.      " "Coloration: Skin is not jaundiced.      Comments: One spider angiomas on chest. Palmar erythema present. No leukonychia.    Neurological:      General: No focal deficit present.      Mental Status: She is alert and oriented to person, place, and time.      Comments: No asterixis.   Psychiatric:         Behavior: Behavior is cooperative.         Thought Content: Thought content normal.         LABS: I personally reviewed pertinent laboratory findings.    Lab Results   Component Value Date    WBC 3.53 (L) 09/06/2023    HGB 11.6 (L) 09/06/2023    HCT 35.9 (L) 09/06/2023    MCV 86 09/06/2023     (L) 09/06/2023       Lab Results   Component Value Date     09/06/2023    K 4.3 09/06/2023     09/06/2023    CO2 20 (L) 09/06/2023    BUN 13 09/06/2023    CREATININE 0.8 09/06/2023    CALCIUM 9.0 09/06/2023    ANIONGAP 10 09/06/2023    ESTGFRAFRICA >60 01/31/2019    EGFRNONAA >60 01/31/2019       Lab Results   Component Value Date    ALT 34 09/19/2023    AST 32 09/06/2023    ALKPHOS 95 09/19/2023    BILITOT 0.6 09/19/2023       Lab Results   Component Value Date    HEPCAB Non-reactive 08/23/2023       No results found for: "YAZMIN", "MITOAB", "SMOOTHMUSCAB", "IGG", "CERULOPLSM"     Computed MELD 3.0 unavailable. Necessary lab results were not found in the last year.  Computed MELD-Na unavailable. Necessary lab results were not found in the last year.       IMAGING: I personally reviewed imaging studies.      EGD 9/18/2023  Indications:           Iron deficiency anemia   Findings:        Esophageal varices were found in the distal esophagus.        Moderate portal hypertensive gastropathy was found in the entire        examined stomach.        A single angioectasia was found in the gastric fundus. Coagulation        for hemostasis using argon plasma was successful. Estimated blood        loss was minimal.        A single angioectasia with no bleeding was found in the gastric        antrum. Coagulation for tissue " destruction using argon plasma was        successful.        Type 2 gastroesophageal varices (GOV2, esophageal varices which        extend along the fundus) with no bleeding were found in the stomach.        The examined duodenum was normal.     Colonoscopy 9/18/2023  Indications:           Iron deficiency anemia   Findings:        The colon (entire examined portion) appeared normal.        Multiple (15) small angioectasias were found in the descending        colon, in the transverse colon and in the ascending colon. One        lesion in the transverse colon was treated wtih coagulation for        hemostasis using argon plasma at 0.9 liters/minute and 25 thomas was        successful.        The exam was otherwise without abnormality.     Assessment:   Ning Rodriguez is a 83 y.o. female with history of fatty liver, DM Type 2, HTN, who was referred to Hepatology Clinic with new diagnosis of portal hypertension after EGD (9/18/23) demonstrated EV, GOV2 as well as moderate PHG. On chart review, liver enzymes, bilirubin, and albumin are normal with low platelets.  There is no recent abdominal imaging. Presentation is most likely due to KLEIN cirrhosis given patient's risk factors and prior history of fatty liver. Another consideration, although less likely, is non-cirrhotic portal hypertension (ie NRH).    1. Esophageal varices without bleeding, unspecified esophageal varices type    2. Hepatic fibrosis, advanced fibrosis    3. Gastric varices without bleeding    4. Portal hypertensive gastropathy    5. History of fatty infiltration of liver      Recommendations:  - LFTs, INR, AFP  - HAV IgG, HBsAg, HBsAb, HBc Total ab, HIV  - YAZMIN  - US abdomen   - Start carvedilol 6.25mg BID, goal resting HR 55-60  - Can discontinue other anti-hypertensives to allow for BP room    Return to clinic in 3 months.    I have sent communication to the referring physician and/or primary care provider.    Kenna Medrano MD  Staff  Physician  Hepatology and Liver Transplant  [facility]  [facility]

## 2023-09-20 LAB
ANA PATTERN 1: NORMAL
ANA SER QL IF: POSITIVE
ANA TITR SER IF: NORMAL {TITER}

## 2023-09-21 LAB
ANTI SM ANTIBODY: 0.12 RATIO (ref 0–0.99)
ANTI SM/RNP ANTIBODY: 0.13 RATIO (ref 0–0.99)
ANTI-SM INTERPRETATION: NEGATIVE
ANTI-SM/RNP INTERPRETATION: NEGATIVE
ANTI-SSA ANTIBODY: 0.4 RATIO (ref 0–0.99)
ANTI-SSA INTERPRETATION: NEGATIVE
ANTI-SSB ANTIBODY: 0.09 RATIO (ref 0–0.99)
ANTI-SSB INTERPRETATION: NEGATIVE
DSDNA AB SER-ACNC: NORMAL [IU]/ML

## 2023-09-22 LAB
HBV SURFACE AB SER QL IA: NEGATIVE
HBV SURFACE AB SERPL IA-ACNC: <3 MIU/ML

## 2023-09-22 NOTE — PROGRESS NOTES
Neurosurgery  Established Patient    SUBJECTIVE:     History of Present Illness 5/2/23:  This is a 83-year-old female who recently diagnosed bilateral subacute subdural hematoma requiring a left frontal parietal craniotomy in [place].  She is since returned to the [place] and saw Dr. Garcia in clinic in March 2023.  At that time, the right sided SDH was improved but the left was slightly larger than immediately post-op.   She presents today for follow up with repeat CTH. She denies any new concerns since her last visit. She denies new or worsening HA, seizures or new focal deficits. Incision is well healed.    Interval history:  Pt represents to clinic for follow up with repeat CTH. She reports she has been doing very well since her last appointment. She denies HA, seizures, or new neuro deficits. She has gotten back to her usual ADLs.     Review of patient's allergies indicates:  No Known Allergies    Current Outpatient Medications   Medication Sig Dispense Refill    augmented betamethasone (DIPROLENE) 0.05 % gel Apply topically to the affected area 4 to 5 times a day while having symptoms 50 g 0    blood sugar diagnostic (CONTOUR TEST STRIPS) Strp Check blood sugar twice weekly plus as directed by  each 6    celecoxib (CELEBREX) 100 MG capsule take 1 Capsule by mouth  twice daily as needed 180 capsule 1    flu vac 2021 [id number],PF, (FLUAD QUAD 2021-22,65Y UP,,PF,) 60 mcg (15 mcg x 4)/0.5 mL Syrg inject by [facility] 0.5 mL 0    glipiZIDE (GLUCOTROL) 5 MG tablet Take 1 tablet (5 mg total) by mouth 2 (two) times daily. 180 tablet 1    glipiZIDE (GLUCOTROL) 5 MG tablet Take 1 tablet (5 mg total) by mouth 2 (two) times daily. 180 tablet 1    hydrALAZINE (APRESOLINE) 10 MG tablet Take 1 tablet by mouth 3 times a day 90 tablet 6    levothyroxine (SYNTHROID) 25 MCG tablet Take 1 tablet (25 mcg total) by mouth before breakfast. 30 tablet 5    omeprazole (PRILOSEC) 20 MG capsule Take 1 capsule by mouth daily 90 capsule 3     promethazine-dextromethorphan (PROMETHAZINE-DM) 6.25-15 mg/5 mL Syrp Take 5 mLs by mouth nightly as needed (cough). 180 mL 0    semaglutide (RYBELSUS) 3 mg tablet Take 1 tablet (3 mg total) by mouth once daily. 90 tablet 4    triamcinolone (NASACORT) 55 mcg nasal inhaler 2 sprays by Nasal route once daily. 10.8 g 0    carvediloL (COREG) 6.25 MG tablet Take 1 tablet (6.25 mg total) by mouth 2 (two) times daily with meals. 60 tablet 11    cevimeline (EVOXAC) 30 mg capsule Take 1 capsule (30 mg total) by mouth 3 (three) times daily. 90 capsule 11    glipiZIDE (GLUCOTROL) 5 MG tablet Take 1 tablet (5 mg total) by mouth once daily. 180 tablet 1    sodium,potassium,mag sulfates (SUPREP BOWEL PREP KIT) 17.5-3.13-1.6 gram SolR use as directed 354 mL 0    tobramycin sulfate 0.3% (TOBREX) 0.3 % ophthalmic solution SMARTSI-2 Drop(s) In Eye(s) Every 4 Hours       Current Facility-Administered Medications   Medication Dose Route Frequency Provider Last Rate Last Admin    sodium chloride 0.9% flush 10 mL  10 mL Intravenous PRN Tami Zarate MD           Past Medical History:   Diagnosis Date    Allergy     Arthritis     Cataract     Diabetes mellitus, type 2     Hypertension     Thyroid disease      Past Surgical History:   Procedure Laterality Date    CATARACT EXTRACTION W/  INTRAOCULAR LENS IMPLANT Right 2022    Procedure: EXTRACTION, CATARACT, WITH IOL INSERTION;  Surgeon: Tami Zarate MD;  Location: [facility] OR;  Service: Ophthalmology;  Laterality: Right;  ORA ONLY FOR CYL MEASUREMENT/TORIC DECISION    CATARACT EXTRACTION W/  INTRAOCULAR LENS IMPLANT Left 2022    Procedure: EXTRACTION, CATARACT, WITH IOL INSERTION;  Surgeon: Tami Zarate MD;  Location: [facility] OR;  Service: Ophthalmology;  Laterality: Left;  ORA ONLY FOR CYL MEASUREMENT/TORIC DECISION    COLONOSCOPY      COLONOSCOPY N/A 2023    Procedure: COLONOSCOPY;  Surgeon: Dov Michele MD;  Location: [facility] ENDO;  Service: Endoscopy;  Laterality:  N/A;    ESOPHAGOGASTRODUODENOSCOPY N/A 9/18/2023    Procedure: EGD (ESOPHAGOGASTRODUODENOSCOPY);  Surgeon: Dov Michele MD;  Location: [facility];  Service: Endoscopy;  Laterality: N/A;    HEMORRHOID SURGERY       Family History    None       Social History     Socioeconomic History    Marital status:    Tobacco Use    Smoking status: Former    Smokeless tobacco: Never   Substance and Sexual Activity    Alcohol use: No     Alcohol/week: 0.0 standard drinks of alcohol    Drug use: No       Review of Systems   Constitutional:  Negative for activity change and appetite change.   Musculoskeletal:  Negative for neck pain and neck stiffness.   Neurological:  Negative for dizziness, seizures, speech difficulty, weakness, numbness and headaches.   All other systems reviewed and are negative.      OBJECTIVE:     Vital Signs  Pulse: 81  BP: (!) 155/80  Pain Score: 0-No pain  There is no height or weight on file to calculate BMI.    Neurosurgery Physical Exam  General: well developed, well nourished, no distress.   Head: normocephalic, atraumatic  Neurologic: Alert and oriented. Thought content appropriate.  GCS: Motor: 6/Verbal: 5/Eyes: 4 GCS Total: 15  Mental Status: Awake, Alert, Oriented x3  Cranial nerves: face symmetric, tongue midline, CN II-XII grossly intact.   Eyes: pupils equal, round, reactive to light with accomodation, EOMI.   Sensory: intact to light touch throughout  Motor Strength:Moves all extremities spontaneously with good tone.  Full strength upper and lower extremities. No abnormal movements seen.   DTR's - 2 + and symmetric in UE and LE  Pronator Drift: no drift noted  Finger-to-nose: Intact bilaterally  Gait: normal      Diagnostic Results:  CTH without contrast dated 9/5/23 shows near resolution of bilateral SDH. No new detrimental changes as compared to prior.     ASSESSMENT/PLAN:     84 yo female with history of bilateral SDH s/p left sided craniotomy for evacuation in [place]. She is  doing well at this stage with near resolution of the SDH on CTH today. She may RTC prn with any new neurologic symptoms. She was encouraged to call us with any questions or concerns.       Maribel Escamilla PA-C  Neurosurgery          Note dictated with voice recognition software, please excuse any grammatical errors.

## 2023-09-23 PROBLEM — D50.0 ANEMIA DUE TO GASTROINTESTINAL BLOOD LOSS: Status: ACTIVE | Noted: 2023-09-23

## 2023-09-23 PROBLEM — K55.20 ANGIODYSPLASIA OF GASTROINTESTINAL TRACT: Status: ACTIVE | Noted: 2023-09-23

## 2023-11-17 NOTE — PROGRESS NOTES
Chief Complaint   Patient presents with    Nasal Congestion    Dizziness    Cerumen Impaction     bilateral    Tinnitus   .     HPI:  Mrs. Rodriguez is a very pleasant 77 y.o. female here to see me today for the first time for evaluation of hearing loss.  She reports hearing loss that has been gradually progressing over the last 5 years.  She has not noted any difference in hearing between the ears, with both ears being the worse hearing ear.  She has noted any tinnitus in both ears.  She has not had any recent issues with ear pain or ear drainage.  She admits to a family history of hearing loss, and has not had any previous otologic surgery.  She denies any history of significant loud noise exposure.She admits to issues with dizziness.     She notes nasal congestion which worsens during certain seasons. She notes aural fullness and pressure.  She feels like this has been worse in the last few weeks. She has been using Zyrtec daily. She recently started Nasocort but uses it intermittently.   She uses nasal saline irrigations.         Past Medical History:   Diagnosis Date    Allergy     Arthritis     Diabetes mellitus, type 2      Social History     Social History    Marital status:      Spouse name: N/A    Number of children: N/A    Years of education: N/A     Occupational History    Not on file.     Social History Main Topics    Smoking status: Former Smoker    Smokeless tobacco: Not on file    Alcohol use No    Drug use: No    Sexual activity: Not on file     Other Topics Concern    Not on file     Social History Narrative    No narrative on file     Past Surgical History:   Procedure Laterality Date    HEMORRHOID SURGERY       No family history on file.      Review of Systems  General: negative for chills, fever or weight loss  Psychological: negative for mood changes or depression  Ophthalmic: negative for blurry vision, photophobia or eye pain  ENT: see HPI  Respiratory: no cough,  shortness of breath, or wheezing  Cardiovascular: no chest pain or dyspnea on exertion  Gastrointestinal: no abdominal pain, change in bowel habits, or black/ bloody stools  Musculoskeletal: negative for gait disturbance or muscular weakness  Neurological: no syncope or seizures; no ataxia  Dermatological: negative for puritis,  rash and jaundice  Hematologic/lymphatic: no easy bruising, no new lumps or bumps      Physical Exam:    Vitals:    09/07/17 1516   BP: (!) 144/91   Pulse: 75   Temp: 98.4 °F (36.9 °C)       Constitutional: Well appearing / communicating without difficutly.  NAD.  Eyes: EOM I Bilaterally  Head/Face: Normocephalic.  Negative paranasal sinus pressure/tenderness.  Salivary glands WNL.  House Brackmann I Bilaterally.    Right Ear: Auricle normal appearance. External Auditory Canal within normal limits no lesions or masses,TM w/o masses/lesions/perforations. TM mobility noted.   Left Ear: Auricle normal appearance. External Auditory Canal within normal limits no lesions or masses,TM w/o masses/lesions/perforations. TM mobility noted.  Rinne Air conduction >bone conduction bilaterally, Segura midline.   Nose: No gross nasal septal deviation. Inferior Turbinates 3+ bilaterally. No septal perforation. No masses/lesions. External nasal skin appears normal without masses/lesions.  Oral Cavity: Gingiva/lips within normal limits.  Dentition/gingiva healthy appearing. Mucus membranes moist. Floor of mouth soft, no masses palpated. Oral Tongue mobile. Hard Palate appears normal.    Oropharynx: Base of tongue appears normal. No masses/lesions noted. Tonsillar fossa/pharyngeal wall without lesions. Posterior oropharynx WNL.  Soft palate without masses. Midline uvula.   Neck/Lymphatic: No LAD I-VI bilaterally.  No thyromegaly.  No masses noted on exam.    Mirror laryngoscopy/nasopharyngoscopy: Active gag reflex.  Unable to perform.    Neuro/Psychiatric: AOx3.  Normal mood and affect.   Cardiovascular: Normal  carotid pulses bilaterally, no increasing jugular venous distention noted at cervical region bilaterally.    Respiratory: Normal respiratory effort, no stridor, no retractions noted.        Audiogram reviewed personally by myself and in detail with the patient today.       Assessment:    ICD-10-CM ICD-9-CM    1. Chronic nonseasonal allergic rhinitis due to other allergen J30.89     2. ETD (eustachian tube dysfunction), bilateral H69.83 381.81    3. Sensorineural hearing loss (SNHL), bilateral H90.3 389.18      The primary encounter diagnosis was Chronic nonseasonal allergic rhinitis due to other allergen. Diagnoses of ETD (eustachian tube dysfunction), bilateral and Sensorineural hearing loss (SNHL), bilateral were also pertinent to this visit.      Plan:  No orders of the defined types were placed in this encounter.        1.  I discussed the documented hearing loss in this setting, as well as candidacy for amplification.   2.  The patient is interested in discussing with audiologist and appointment as well as contact information was given.  3.   Annual audiograms recommended, as well as ear protection when exposed to loud sounds.  4. We had a long discussion regarding the anatomy and function of the eustachian tube and the role allergic rhinitis plays into this dysfunction.  We discussed that the eustachian tube acts as a pump to keep the appropriate amount of pressure behind the ear drum.  I gave the patient a prescription for a nasal steroid spray to be used on a daily basis, and we discussed that it will take 2-3 weeks of daily use to achieve maximal effectiveness.  I also started xyzal and stopped zyrtec and she will receive a Depo-medrol injection IM today.     Thank you kindly for allowing me to participate in the patient's care.       Adeline Galvan MD           [facility]  Cardiology  [address] [place] [address]  Phone: [phone]  Fax: [phone]  Follow Up Time:     Mannie Mccarthy  Gastroenterology  [address] [place] [address]  Phone: [phone]  Fax: [phone]  Follow Up Time:     Robin Dykes  Surgery  [address] [place] [address]  Phone: [phone]  Fax: [phone]  Follow Up Time:

## 2023-11-20 ENCOUNTER — HOSPITAL ENCOUNTER (OUTPATIENT)
Facility: HOSPITAL | Age: 83
Discharge: HOME | End: 2023-11-21
Attending: EMERGENCY MEDICINE | Admitting: STUDENT IN AN ORGANIZED HEALTH CARE EDUCATION/TRAINING PROGRAM
Payer: MEDICARE

## 2023-11-20 DIAGNOSIS — K80.10 CALCULUS OF GALLBLADDER WITH CHRONIC CHOLECYSTITIS WITHOUT OBSTRUCTION: Primary | ICD-10-CM

## 2023-11-20 DIAGNOSIS — R11.14 BILIOUS VOMITING WITH NAUSEA: ICD-10-CM

## 2023-11-20 DIAGNOSIS — D50.0 ANEMIA DUE TO GASTROINTESTINAL BLOOD LOSS: ICD-10-CM

## 2023-11-20 DIAGNOSIS — R10.10 PAIN OF UPPER ABDOMEN: ICD-10-CM

## 2023-11-20 DIAGNOSIS — R50.9 FEVER: ICD-10-CM

## 2023-11-20 DIAGNOSIS — K55.20 ANGIODYSPLASIA OF GASTROINTESTINAL TRACT: ICD-10-CM

## 2023-11-20 DIAGNOSIS — H25.12 NUCLEAR SCLEROTIC CATARACT OF LEFT EYE: ICD-10-CM

## 2023-11-20 LAB
ALBUMIN SERPL BCP-MCNC: 3.7 G/DL (ref 3.5–5.2)
ALP SERPL-CCNC: 70 U/L (ref 55–135)
ALT SERPL W/O P-5'-P-CCNC: 31 U/L (ref 10–44)
ANION GAP SERPL CALC-SCNC: 12 MMOL/L (ref 8–16)
AST SERPL-CCNC: 29 U/L (ref 10–40)
BASOPHILS # BLD AUTO: 0.03 K/UL (ref 0–0.2)
BASOPHILS NFR BLD: 0.5 % (ref 0–1.9)
BILIRUB SERPL-MCNC: 1.5 MG/DL (ref 0.1–1)
BILIRUB UR QL STRIP: NEGATIVE
BUN SERPL-MCNC: 16 MG/DL (ref 8–23)
CALCIUM SERPL-MCNC: 8.5 MG/DL (ref 8.7–10.5)
CHLORIDE SERPL-SCNC: 106 MMOL/L (ref 95–110)
CLARITY UR: CLEAR
CO2 SERPL-SCNC: 18 MMOL/L (ref 23–29)
COLOR UR: YELLOW
CREAT SERPL-MCNC: 0.8 MG/DL (ref 0.5–1.4)
CTP QC/QA: YES
DIFFERENTIAL METHOD: ABNORMAL
EOSINOPHIL # BLD AUTO: 0 K/UL (ref 0–0.5)
EOSINOPHIL NFR BLD: 0.4 % (ref 0–8)
ERYTHROCYTE [DISTWIDTH] IN BLOOD BY AUTOMATED COUNT: 16.9 % (ref 11.5–14.5)
EST. GFR  (NO RACE VARIABLE): >60 ML/MIN/1.73 M^2
GLUCOSE SERPL-MCNC: 136 MG/DL (ref 70–110)
GLUCOSE UR QL STRIP: NEGATIVE
HCT VFR BLD AUTO: 40.5 % (ref 37–48.5)
HGB BLD-MCNC: 13.5 G/DL (ref 12–16)
HGB UR QL STRIP: ABNORMAL
IMM GRANULOCYTES # BLD AUTO: 0.01 K/UL (ref 0–0.04)
IMM GRANULOCYTES NFR BLD AUTO: 0.2 % (ref 0–0.5)
INFLUENZA A, MOLECULAR: NEGATIVE
INFLUENZA B, MOLECULAR: NEGATIVE
KETONES UR QL STRIP: ABNORMAL
LACTATE SERPL-SCNC: 1.8 MMOL/L (ref 0.5–2.2)
LEUKOCYTE ESTERASE UR QL STRIP: ABNORMAL
LIPASE SERPL-CCNC: 20 U/L (ref 4–60)
LYMPHOCYTES # BLD AUTO: 0.2 K/UL (ref 1–4.8)
LYMPHOCYTES NFR BLD: 3.9 % (ref 18–48)
MCH RBC QN AUTO: 29.9 PG (ref 27–31)
MCHC RBC AUTO-ENTMCNC: 33.3 G/DL (ref 32–36)
MCV RBC AUTO: 90 FL (ref 82–98)
MICROSCOPIC COMMENT: ABNORMAL
MONOCYTES # BLD AUTO: 0.3 K/UL (ref 0.3–1)
MONOCYTES NFR BLD: 5.3 % (ref 4–15)
NEUTROPHILS # BLD AUTO: 5.1 K/UL (ref 1.8–7.7)
NEUTROPHILS NFR BLD: 89.7 % (ref 38–73)
NITRITE UR QL STRIP: NEGATIVE
NRBC BLD-RTO: 0 /100 WBC
PH UR STRIP: 6 [PH] (ref 5–8)
PLATELET # BLD AUTO: 93 K/UL (ref 150–450)
PMV BLD AUTO: 10.8 FL (ref 9.2–12.9)
POTASSIUM SERPL-SCNC: 4.3 MMOL/L (ref 3.5–5.1)
PROCALCITONIN SERPL IA-MCNC: 0.22 NG/ML
PROT SERPL-MCNC: 7.1 G/DL (ref 6–8.4)
PROT UR QL STRIP: ABNORMAL
RBC # BLD AUTO: 4.52 M/UL (ref 4–5.4)
RBC #/AREA URNS HPF: 5 /HPF (ref 0–4)
SARS-COV-2 RDRP RESP QL NAA+PROBE: NEGATIVE
SODIUM SERPL-SCNC: 136 MMOL/L (ref 136–145)
SP GR UR STRIP: 1.03 (ref 1–1.03)
SPECIMEN SOURCE: NORMAL
URN SPEC COLLECT METH UR: ABNORMAL
UROBILINOGEN UR STRIP-ACNC: NEGATIVE EU/DL
WBC # BLD AUTO: 5.63 K/UL (ref 3.9–12.7)
WBC #/AREA URNS HPF: 3 /HPF (ref 0–5)

## 2023-11-20 PROCEDURE — G0378 HOSPITAL OBSERVATION PER HR: HCPCS

## 2023-11-20 PROCEDURE — 81000 URINALYSIS NONAUTO W/SCOPE: CPT | Performed by: EMERGENCY MEDICINE

## 2023-11-20 PROCEDURE — 87040 BLOOD CULTURE FOR BACTERIA: CPT | Mod: 59 | Performed by: STUDENT IN AN ORGANIZED HEALTH CARE EDUCATION/TRAINING PROGRAM

## 2023-11-20 PROCEDURE — 96365 THER/PROPH/DIAG IV INF INIT: CPT

## 2023-11-20 PROCEDURE — 99285 EMERGENCY DEPT VISIT HI MDM: CPT | Mod: 25

## 2023-11-20 PROCEDURE — 63600175 PHARM REV CODE 636 W HCPCS: Performed by: STUDENT IN AN ORGANIZED HEALTH CARE EDUCATION/TRAINING PROGRAM

## 2023-11-20 PROCEDURE — 25000003 PHARM REV CODE 250: Performed by: STUDENT IN AN ORGANIZED HEALTH CARE EDUCATION/TRAINING PROGRAM

## 2023-11-20 PROCEDURE — 85025 COMPLETE CBC W/AUTO DIFF WBC: CPT | Performed by: EMERGENCY MEDICINE

## 2023-11-20 PROCEDURE — 87635 SARS-COV-2 COVID-19 AMP PRB: CPT | Performed by: EMERGENCY MEDICINE

## 2023-11-20 PROCEDURE — 83605 ASSAY OF LACTIC ACID: CPT | Performed by: STUDENT IN AN ORGANIZED HEALTH CARE EDUCATION/TRAINING PROGRAM

## 2023-11-20 PROCEDURE — 80053 COMPREHEN METABOLIC PANEL: CPT | Performed by: EMERGENCY MEDICINE

## 2023-11-20 PROCEDURE — 63600175 PHARM REV CODE 636 W HCPCS: Performed by: EMERGENCY MEDICINE

## 2023-11-20 PROCEDURE — 25000003 PHARM REV CODE 250: Performed by: EMERGENCY MEDICINE

## 2023-11-20 PROCEDURE — 96361 HYDRATE IV INFUSION ADD-ON: CPT

## 2023-11-20 PROCEDURE — 25500020 PHARM REV CODE 255: Performed by: SURGERY

## 2023-11-20 PROCEDURE — 83690 ASSAY OF LIPASE: CPT | Performed by: EMERGENCY MEDICINE

## 2023-11-20 PROCEDURE — 84145 PROCALCITONIN (PCT): CPT | Performed by: STUDENT IN AN ORGANIZED HEALTH CARE EDUCATION/TRAINING PROGRAM

## 2023-11-20 PROCEDURE — 96375 TX/PRO/DX INJ NEW DRUG ADDON: CPT

## 2023-11-20 PROCEDURE — 87502 INFLUENZA DNA AMP PROBE: CPT | Performed by: EMERGENCY MEDICINE

## 2023-11-20 RX ORDER — IBUPROFEN 200 MG
16 TABLET ORAL
Status: CANCELLED | OUTPATIENT
Start: 2023-11-20

## 2023-11-20 RX ORDER — CARVEDILOL 6.25 MG/1
6.25 TABLET ORAL 2 TIMES DAILY WITH MEALS
Status: CANCELLED | OUTPATIENT
Start: 2023-11-21

## 2023-11-20 RX ORDER — ONDANSETRON HYDROCHLORIDE 4 MG/5ML
4 SOLUTION ORAL ONCE
Status: DISCONTINUED | OUTPATIENT
Start: 2023-11-20 | End: 2023-11-21 | Stop reason: HOSPADM

## 2023-11-20 RX ORDER — ONDANSETRON 2 MG/ML
4 INJECTION INTRAMUSCULAR; INTRAVENOUS
Status: COMPLETED | OUTPATIENT
Start: 2023-11-20 | End: 2023-11-20

## 2023-11-20 RX ORDER — MUPIROCIN 20 MG/G
OINTMENT TOPICAL
Status: CANCELLED | OUTPATIENT
Start: 2023-11-20

## 2023-11-20 RX ORDER — HYDROMORPHONE HYDROCHLORIDE 1 MG/ML
0.2 INJECTION, SOLUTION INTRAMUSCULAR; INTRAVENOUS; SUBCUTANEOUS EVERY 4 HOURS PRN
Status: DISCONTINUED | OUTPATIENT
Start: 2023-11-20 | End: 2023-11-21 | Stop reason: HOSPADM

## 2023-11-20 RX ORDER — LEVOTHYROXINE SODIUM 25 UG/1
25 TABLET ORAL
Status: CANCELLED | OUTPATIENT
Start: 2023-11-21

## 2023-11-20 RX ORDER — GLUCAGON 1 MG
1 KIT INJECTION
Status: CANCELLED | OUTPATIENT
Start: 2023-11-20

## 2023-11-20 RX ORDER — PANTOPRAZOLE SODIUM 40 MG/1
40 TABLET, DELAYED RELEASE ORAL DAILY
Status: CANCELLED | OUTPATIENT
Start: 2023-11-21

## 2023-11-20 RX ORDER — CELECOXIB 100 MG/1
100 CAPSULE ORAL 2 TIMES DAILY
Status: CANCELLED | OUTPATIENT
Start: 2023-11-20

## 2023-11-20 RX ORDER — IBUPROFEN 200 MG
24 TABLET ORAL
Status: CANCELLED | OUTPATIENT
Start: 2023-11-20

## 2023-11-20 RX ORDER — TOBRAMYCIN 3 MG/ML
1 SOLUTION/ DROPS OPHTHALMIC EVERY 4 HOURS
Status: CANCELLED | OUTPATIENT
Start: 2023-11-21

## 2023-11-20 RX ORDER — FLUTICASONE PROPIONATE 50 MCG
1 SPRAY, SUSPENSION (ML) NASAL DAILY
Status: CANCELLED | OUTPATIENT
Start: 2023-11-21

## 2023-11-20 RX ORDER — BETAMETHASONE VALERATE 1.2 MG/G
CREAM TOPICAL DAILY
Status: CANCELLED | OUTPATIENT
Start: 2023-11-21

## 2023-11-20 RX ORDER — GLIPIZIDE 5 MG/1
5 TABLET ORAL DAILY
Status: CANCELLED | OUTPATIENT
Start: 2023-11-21

## 2023-11-20 RX ORDER — DEXTROSE MONOHYDRATE, SODIUM CHLORIDE, AND POTASSIUM CHLORIDE 50; .745; 4.5 G/1000ML; G/1000ML; G/1000ML
INJECTION, SOLUTION INTRAVENOUS CONTINUOUS
Status: DISCONTINUED | OUTPATIENT
Start: 2023-11-20 | End: 2023-11-21 | Stop reason: HOSPADM

## 2023-11-20 RX ADMIN — SODIUM CHLORIDE 500 ML: 9 INJECTION, SOLUTION INTRAVENOUS at 08:11

## 2023-11-20 RX ADMIN — IOHEXOL 75 ML: 350 INJECTION, SOLUTION INTRAVENOUS at 08:11

## 2023-11-20 RX ADMIN — SODIUM CHLORIDE 500 ML: 9 INJECTION, SOLUTION INTRAVENOUS at 07:11

## 2023-11-20 RX ADMIN — PIPERACILLIN AND TAZOBACTAM 4.5 G: 4; .5 INJECTION, POWDER, LYOPHILIZED, FOR SOLUTION INTRAVENOUS; PARENTERAL at 08:11

## 2023-11-20 RX ADMIN — ONDANSETRON 4 MG: 2 INJECTION INTRAMUSCULAR; INTRAVENOUS at 07:11

## 2023-11-21 VITALS
OXYGEN SATURATION: 93 % | WEIGHT: 174.81 LBS | DIASTOLIC BLOOD PRESSURE: 67 MMHG | SYSTOLIC BLOOD PRESSURE: 138 MMHG | BODY MASS INDEX: 28.09 KG/M2 | TEMPERATURE: 99 F | RESPIRATION RATE: 17 BRPM | HEART RATE: 75 BPM | HEIGHT: 66 IN

## 2023-11-21 PROBLEM — R11.14 BILIOUS VOMITING WITH NAUSEA: Status: ACTIVE | Noted: 2023-11-21

## 2023-11-21 PROBLEM — K80.10 CALCULUS OF GALLBLADDER WITH CHRONIC CHOLECYSTITIS WITHOUT OBSTRUCTION: Status: ACTIVE | Noted: 2023-11-21

## 2023-11-21 PROBLEM — R10.10 PAIN OF UPPER ABDOMEN: Status: ACTIVE | Noted: 2023-11-21

## 2023-11-21 PROCEDURE — 90471 IMMUNIZATION ADMIN: CPT | Performed by: SURGERY

## 2023-11-21 PROCEDURE — G0378 HOSPITAL OBSERVATION PER HR: HCPCS

## 2023-11-21 PROCEDURE — 63600175 PHARM REV CODE 636 W HCPCS: Performed by: SURGERY

## 2023-11-21 PROCEDURE — 90694 VACC AIIV4 NO PRSRV 0.5ML IM: CPT | Performed by: SURGERY

## 2023-11-21 PROCEDURE — G0008 ADMIN INFLUENZA VIRUS VAC: HCPCS | Performed by: SURGERY

## 2023-11-21 RX ORDER — AMOXICILLIN AND CLAVULANATE POTASSIUM 875; 125 MG/1; MG/1
1 TABLET, FILM COATED ORAL 2 TIMES DAILY
Qty: 30 TABLET | Refills: 1 | Status: SHIPPED | OUTPATIENT
Start: 2023-11-21

## 2023-11-21 RX ADMIN — INFLUENZA A VIRUS A/VICTORIA/4897/2022 IVR-238 (H1N1) ANTIGEN (FORMALDEHYDE INACTIVATED), INFLUENZA A VIRUS A/DARWIN/6/2021 IVR-227 (H3N2) ANTIGEN (FORMALDEHYDE INACTIVATED), INFLUENZA B VIRUS B/AUSTRIA/1359417/2021 BVR-26 ANTIGEN (FORMALDEHYDE INACTIVATED), INFLUENZA B VIRUS B/PHUKET/3073/2013 BVR-1B ANTIGEN (FORMALDEHYDE INACTIVATED) 0.5 ML: 15; 15; 15; 15 INJECTION, SUSPENSION INTRAMUSCULAR at 11:11

## 2023-11-21 RX ADMIN — DEXTROSE, SODIUM CHLORIDE, AND POTASSIUM CHLORIDE: 5; .45; .075 INJECTION INTRAVENOUS at 12:11

## 2023-11-21 NOTE — PLAN OF CARE
Problem: Adult Inpatient Plan of Care  Goal: Plan of Care Review  11/21/2023 0626 by Kem Antoine RN  Outcome: Ongoing, Progressing  Flowsheets (Taken 11/21/2023 0626)  Plan of Care Reviewed With: patient  11/21/2023 0530 by Kem Antoine RN  Outcome: Ongoing, Progressing  Flowsheets (Taken 11/21/2023 0530)  Plan of Care Reviewed With: patient     Problem: Diabetes Comorbidity  Goal: Blood Glucose Level Within Targeted Range  Outcome: Ongoing, Progressing  Intervention: Monitor and Manage Glycemia  Flowsheets (Taken 11/21/2023 0626)  Glycemic Management: blood glucose monitored     Problem: Fall Injury Risk  Goal: Absence of Fall and Fall-Related Injury  Outcome: Ongoing, Progressing  Intervention: Promote Injury-Free Environment  Flowsheets (Taken 11/21/2023 0530)  Safety Promotion/Fall Prevention: medications reviewed     Problem: Pain Acute  Goal: Acceptable Pain Control and Functional Ability  11/21/2023 0626 by Kem Antoine RN  Outcome: Ongoing, Progressing  11/21/2023 0530 by Kem Antoine RN  Outcome: Ongoing, Progressing  Intervention: Optimize Psychosocial Wellbeing  Flowsheets (Taken 11/21/2023 0626)  Supportive Measures: active listening utilized     Problem: Nausea and Vomiting  Goal: Fluid and Electrolyte Balance  11/21/2023 0626 by Kem Antoine RN  Outcome: Ongoing, Progressing  11/21/2023 0530 by Kem Antoine RN  Outcome: Ongoing, Progressing  Intervention: Prevent and Manage Nausea and Vomiting  Flowsheets (Taken 11/21/2023 0530)  Environmental Support: calm environment promoted

## 2023-11-21 NOTE — PLAN OF CARE
Problem: Adult Inpatient Plan of Care  Goal: Plan of Care Review  Outcome: Ongoing, Progressing     VIRTUAL NURSE:  Cued into patient's room.  Permission received per patient to turn camera to view patient.  Introduced as VN for night shift that will be working with floor nurse and nursing assistant.  Educated patient on VN's role in patient care and  [facility] model.  Plan of care reviewed with patient.  Education per flowsheet.   Informed patient that staff will round on them every 2 hours but to use call light for any other needs they may have; informed of fall risk and fall precautions.  Patient verbalized understanding.  Call light within reach; bed siderails up x3.  Opportunity given for questions and questions answered.  Admission assessment questions answered.  Patient denies complaints or any needs at this time. Instructed to call for assistance.  Will cont to monitor and intervene as needed.    Labs, notes, orders, and careplan initiated.       11/21/23 0620   Patient Request   Patient Requested no complaints or needs currently; family at bedside   Admission   Initial VN Admission Questions Complete   Communication Issues? None   Shift   Virtual Nurse - Rounding Complete   Pain Management Interventions pain management plan reviewed with patient/caregiver   Virtual Nurse - Patient Verbalized Approval Of Camera Use;VN Rounding   Type of Frequent Check   Type Patient Rounds   Safety/Activity   Patient Rounds bed in low position;placement of personal items at bedside;bed wheels locked;call light in patient/parent reach;visualized patient;clutter free environment maintained   Safety Promotion/Fall Prevention assistive device/personal item within reach;bed alarm set;commode/urinal/bedpan at bedside;high risk medications identified;Fall Risk reviewed with patient/family;diversional activities provided;medications reviewed;nonskid shoes/socks when out of bed;room near unit station;side rails raised x 3;supervised  activity;Supervised toileting - stay within arms reach;instructed to call staff for mobility   Safety Precautions emergency equipment at bedside   Positioning   Body Position neutral body alignment;neutral head position;position changed independently   Head of Bed (HOB) Positioning HOB at 60-90 degrees   Pain/Comfort/Sleep   Preferred Pain Scale number (Numeric Rating Pain Scale)   Comfort/Acceptable Pain Level 2   Pain Rating (0-10): Rest 0   Sleep/Rest/Relaxation no problem identified;awake

## 2023-11-21 NOTE — NURSING
11/21/23 0134   Patient Request   Patient Requested patient asleep; will attempt admit assessment later   Nurse Notification   Bedside Nurse Notified? Yes   Name of Bedside Nurse JASON Brownlee   Nurse Notfication Method Secure Chat   Nurse Notified Of Patient Request

## 2023-11-21 NOTE — H&P
Today`s Date: 11/20/2023     Admit Date: 11/20/2023    Admitting Physician: Nikolai Price MD    Patient`s Name: Ning Rodriguez , 83 y.o. female    HISTORY AND CHIEF COMPLAINT  Admitted with nausea and vomiting associated with abdominal pian, w/u revealed gall stones with gall bladder wall thickening , possilbe cholecystitis   Patient Active Problem List   Diagnosis    Nuclear sclerotic cataract of left eye    COVID    Fear of falling    Weakness of both hips    Angiodysplasia of gastrointestinal tract    Anemia due to gastrointestinal blood loss       Past Medical History:   Diagnosis Date    Allergy     Arthritis     Cataract     Diabetes mellitus, type 2     Hypertension     Thyroid disease        Past Surgical History:   Procedure Laterality Date    CATARACT EXTRACTION W/  INTRAOCULAR LENS IMPLANT Right 05/16/2022    Procedure: EXTRACTION, CATARACT, WITH IOL INSERTION;  Surgeon: Tami Zarate MD;  Location: [facility];  Service: Ophthalmology;  Laterality: Right;  ORA ONLY FOR CYL MEASUREMENT/TORIC DECISION    CATARACT EXTRACTION W/  INTRAOCULAR LENS IMPLANT Left 06/20/2022    Procedure: EXTRACTION, CATARACT, WITH IOL INSERTION;  Surgeon: Tami Zarate MD;  Location: [facility];  Service: Ophthalmology;  Laterality: Left;  ORA ONLY FOR CYL MEASUREMENT/TORIC DECISION    COLONOSCOPY      COLONOSCOPY N/A 9/18/2023    Procedure: COLONOSCOPY;  Surgeon: Dov Michele MD;  Location: [facility];  Service: Endoscopy;  Laterality: N/A;    ESOPHAGOGASTRODUODENOSCOPY N/A 9/18/2023    Procedure: EGD (ESOPHAGOGASTRODUODENOSCOPY);  Surgeon: Dov Michele MD;  Location: [facility];  Service: Endoscopy;  Laterality: N/A;    HEMORRHOID SURGERY         Prior to Admission medications    Medication Sig Start Date End Date Taking? Authorizing Provider   augmented betamethasone (DIPROLENE) 0.05 % gel Apply topically to the affected area 4 to 5 times a day while having symptoms 11/15/22      blood sugar diagnostic  (CONTOUR TEST STRIPS) Strp Check blood sugar twice weekly plus as directed by MD 18   Gume Rodriguez MD   carvediloL (COREG) 6.25 MG tablet Take 1 tablet (6.25 mg total) by mouth 2 (two) times daily with meals. 23  Kenna Medrano MD   celecoxib (CELEBREX) 100 MG capsule take 1 Capsule by mouth  twice daily as needed 6/15/21      cevimeline (EVOXAC) 30 mg capsule Take 1 capsule (30 mg total) by mouth 3 (three) times daily. 22  Gume Rodriguez MD   flu vac  65up-[id number],PF, (FLUAD QUAD ,65Y UP,,PF,) 60 mcg (15 mcg x 4)/0.5 mL Syrg inject by [facility] 12/15/21   Jeffry Cornell, PharmD   glipiZIDE (GLUCOTROL) 5 MG tablet Take 1 tablet (5 mg total) by mouth 2 (two) times daily. 22      glipiZIDE (GLUCOTROL) 5 MG tablet Take 1 tablet (5 mg total) by mouth 2 (two) times daily. 23      glipiZIDE (GLUCOTROL) 5 MG tablet Take 1 tablet (5 mg total) by mouth once daily. 23      hydrALAZINE (APRESOLINE) 25 MG tablet Take 1 tablet (25 mg total) by mouth 3 (three) times daily. 10/2/23   Gume Rodriguez MD   levothyroxine (SYNTHROID) 25 MCG tablet Take 1 tablet (25 mcg total) by mouth before breakfast. 11/3/23      omeprazole (PRILOSEC) 20 MG capsule Take 1 capsule by mouth daily 3/3/23   Gume Rodriguez MD   promethazine-dextromethorphan (PROMETHAZINE-DM) 6.25-15 mg/5 mL Syrp Take 5 mLs by mouth nightly as needed (cough). 22   Anu Mitchell, NP   semaglutide (RYBELSUS) 3 mg tablet Take 1 tablet (3 mg total) by mouth once daily. 23  Gume Rodriguez MD   sodium,potassium,mag sulfates (SUPREP BOWEL PREP KIT) 17.5-3.13-1.6 gram SolR use as directed 23      tobramycin sulfate 0.3% (TOBREX) 0.3 % ophthalmic solution SMARTSI-2 Drop(s) In Eye(s) Every 4 Hours 23   Provider, Historical   triamcinolone (NASACORT) 55 mcg nasal inhaler 2 sprays by Nasal route once daily. 17   Adeline Washington MD   amLODIPine (NORVASC) 10 MG tablet Take 1  tablet (10 mg total) by mouth once daily. 3/23/23 8/15/23  Gume Rodriguez MD   lisinopriL (PRINIVIL,ZESTRIL) 20 MG tablet Take 1 tablet (20 mg total) by mouth once daily. 1/9/22 4/5/22  Gume Rodriguez MD   metFORMIN (GLUCOPHAGE) 500 MG tablet Take 1 tablet (500 mg total) by mouth 2 (two) times daily with meals. 10/15/19 2/13/20  Gume Rodriguez MD     No current facility-administered medications on file prior to encounter.     Current Outpatient Medications on File Prior to Encounter   Medication Sig Dispense Refill    augmented betamethasone (DIPROLENE) 0.05 % gel Apply topically to the affected area 4 to 5 times a day while having symptoms 50 g 0    blood sugar diagnostic (CONTOUR TEST STRIPS) Strp Check blood sugar twice weekly plus as directed by  each 6    carvediloL (COREG) 6.25 MG tablet Take 1 tablet (6.25 mg total) by mouth 2 (two) times daily with meals. 60 tablet 11    celecoxib (CELEBREX) 100 MG capsule take 1 Capsule by mouth  twice daily as needed 180 capsule 1    cevimeline (EVOXAC) 30 mg capsule Take 1 capsule (30 mg total) by mouth 3 (three) times daily. 90 capsule 11    flu vac 2021 65up-[id number],PF, (FLUAD QUAD 2021-22,65Y UP,,PF,) 60 mcg (15 mcg x 4)/0.5 mL Syrg inject by [facility] 0.5 mL 0    glipiZIDE (GLUCOTROL) 5 MG tablet Take 1 tablet (5 mg total) by mouth 2 (two) times daily. 180 tablet 1    glipiZIDE (GLUCOTROL) 5 MG tablet Take 1 tablet (5 mg total) by mouth 2 (two) times daily. 180 tablet 1    glipiZIDE (GLUCOTROL) 5 MG tablet Take 1 tablet (5 mg total) by mouth once daily. 180 tablet 1    hydrALAZINE (APRESOLINE) 25 MG tablet Take 1 tablet (25 mg total) by mouth 3 (three) times daily. 270 tablet 6    levothyroxine (SYNTHROID) 25 MCG tablet Take 1 tablet (25 mcg total) by mouth before breakfast. 30 tablet 5    omeprazole (PRILOSEC) 20 MG capsule Take 1 capsule by mouth daily 90 capsule 3    promethazine-dextromethorphan (PROMETHAZINE-DM) 6.25-15 mg/5 mL Syrp Take 5 mLs by mouth nightly  as needed (cough). 180 mL 0    semaglutide (RYBELSUS) 3 mg tablet Take 1 tablet (3 mg total) by mouth once daily. 90 tablet 4    sodium,potassium,mag sulfates (SUPREP BOWEL PREP KIT) 17.5-3.13-1.6 gram SolR use as directed 354 mL 0    tobramycin sulfate 0.3% (TOBREX) 0.3 % ophthalmic solution SMARTSI-2 Drop(s) In Eye(s) Every 4 Hours      triamcinolone (NASACORT) 55 mcg nasal inhaler 2 sprays by Nasal route once daily. 10.8 g 0    [DISCONTINUED] amLODIPine (NORVASC) 10 MG tablet Take 1 tablet (10 mg total) by mouth once daily. 90 tablet 3    [DISCONTINUED] lisinopriL (PRINIVIL,ZESTRIL) 20 MG tablet Take 1 tablet (20 mg total) by mouth once daily. 90 tablet 3    [DISCONTINUED] metFORMIN (GLUCOPHAGE) 500 MG tablet Take 1 tablet (500 mg total) by mouth 2 (two) times daily with meals. 60 tablet 3        Review of patient's allergies indicates:  No Known Allergies    Social History:   reports that she has quit smoking. She has never used smokeless tobacco. She reports that she does not drink alcohol and does not use drugs.     History reviewed. No pertinent family history.    PHYSICAL EXAMINATION  Temp:  [98.9 °F (37.2 °C)-100.4 °F (38 °C)] 98.9 °F (37.2 °C)  Pulse:  [80-88] 82  Resp:  [18] 18  SpO2:  [92 %-96 %] 93 %  BP: (154-186)/(67-84) 154/67    General Condition:   Alert x 3     Head & Neck  Anemia: None  Jaundice: None  Neck vein: Not distended  Carotid Bruits: none  Lymph nodes: none palpable  Thyroid: normal    Chest: normal    Heart: normal    Rt. Breast: not examined  Lt. Breast: not examined  Axillary lymph nodes: none    Abdomen: Soft,  None tender with no palpable mass or organ  Hernia: none    Rectal: Defered    Extremities: normal    Vascular: normal    Specific focus Examination    Imp: Acute cholecystitis and cholelithiasis,     Plan: admit , iv antibiotics , hida scan   Possible surgery .

## 2023-11-21 NOTE — PLAN OF CARE
Problem: Adult Inpatient Plan of Care  Goal: Plan of Care Review  Outcome: Ongoing, Progressing  Flowsheets (Taken 11/21/2023 0530)  Plan of Care Reviewed With: patient     Problem: Fall Injury Risk  Goal: Absence of Fall and Fall-Related Injury  Outcome: Ongoing, Progressing  Intervention: Promote Injury-Free Environment  Flowsheets (Taken 11/21/2023 0530)  Safety Promotion/Fall Prevention: medications reviewed     Problem: Pain Acute  Goal: Acceptable Pain Control and Functional Ability  Outcome: Ongoing, Progressing     Problem: Nausea and Vomiting  Goal: Fluid and Electrolyte Balance  Outcome: Ongoing, Progressing  Intervention: Prevent and Manage Nausea and Vomiting  Flowsheets (Taken 11/21/2023 0530)  Environmental Support: calm environment promoted     Vitals:    11/21/23 0528   BP: (!) 117/57   Pulse: 78   Resp: 17   Temp: 98.8 °F (37.1 °C)

## 2023-11-21 NOTE — PROGRESS NOTES
"Surgery follow up  BP (!) 117/57 (BP Location: Right arm, Patient Position: Lying)   Pulse 78   Temp 98.8 °F (37.1 °C) (Oral)   Resp 17   Ht 5' 6" (1.676 m)   Wt 79.3 kg (174 lb 13.2 oz)   SpO2 (!) 92%   Breastfeeding No   BMI 28.22 kg/m²   No intake/output data recorded.  No intake/output data recorded.    Recent Results (from the past 336 hour(s))   CBC W/ AUTO DIFFERENTIAL    Collection Time: 11/20/23  7:14 PM   Result Value Ref Range    WBC 5.63 3.90 - 12.70 K/uL    Hemoglobin 13.5 12.0 - 16.0 g/dL    Hematocrit 40.5 37.0 - 48.5 %    Platelets 93 (L) 150 - 450 K/uL     No more abdominal pain , no nausea or vomiting  Abdomen soft non tender ,   Hida scan negative.    Plan: will observe for now possible food related nausea and vomiting,discharge home today , follow up in office one week Tuesday, to treat now with augelderin  "

## 2023-11-21 NOTE — PLAN OF CARE
[place] [facility] S[facility]  Initial Discharge Assessment       Primary Care Provider: Pam Soliman MD    Admission Diagnosis: Fever [R50.9]  Calculus of gallbladder with chronic cholecystitis without obstruction [K80.10]    Admission Date: 11/20/2023  Expected Discharge Date: 11/21/2023    DCA done with pt. Pt lives with spouse. Independent with ADLs. No DME. Prepares and administers own medication. Family to transport home today at [place].          Payor: [employer] MANAGED MEDICARE / Plan: [employer] CHOICES 65 / Product Type: Medicare Advantage /     Extended Emergency Contact Information  Primary Emergency Contact: Dwight Rodriguez  [address] [address] [facility]  Home Phone: [phone]  Mobile Phone: [phone]  Relation: Spouse    Discharge Plan A: (P) Home, Home with family         [facility]  [address]  [place] [address]  Phone: [phone] Fax: [phone]    [facility] [address]  [address]  [facility] [address]  Phone: [phone] Fax: [phone]       11/21/23 1030   Discharge Planning   Assessment Type Discharge Planning Brief Assessment   Resource/Environmental Concerns none   Support Systems Spouse/significant other;Family members   Equipment Currently Used at Home none   Current Living Arrangements home   Patient/Family Anticipates Transition to home;home with family   Patient/Family Anticipated Services at Transition none   DME Needed Upon Discharge  none   Discharge Plan A Home;Home with family   Financial Resource Strain   How hard is it for you to pay for the very basics like food, housing, medical care, and heating? Not hard   Housing Stability   In the last 12 months, was there a time when you were not able to pay the mortgage or rent on time? N   In the last 12 months, was there a time when you did not have a steady place to sleep or slept in a shelter (including  "now)? N   Transportation Needs   In the past 12 months, has lack of transportation kept you from medical appointments or from getting medications? no   In the past 12 months, has lack of transportation kept you from meetings, work, or from getting things needed for daily living? No   Food Insecurity   Within the past 12 months, you worried that your food would run out before you got the money to buy more. Never true   Within the past 12 months, the food you bought just didn't last and you didn't have money to get more. Never true   Social Connections   Are you , , , , never , or living with a partner?        Future Appointments   Date Time Provider Department Center   11/27/2023  2:00 PM Chance Mensah MD [facility] HEM ONC [facility]   12/4/2023  8:15 AM [facility] [place]   12/13/2023  3:30 PM Kenna Medrano MD [facility] HEPAT [address]     BP (!) 117/57 (BP Location: Right arm, Patient Position: Lying)   Pulse 78   Temp 98.8 °F (37.1 °C) (Oral)   Resp 17   Ht 5' 6" (1.676 m)   Wt 79.3 kg (174 lb 13.2 oz)   SpO2 (!) 92%   Breastfeeding No   BMI 28.22 kg/m²      ondansetron  4 mg Oral Once                        "

## 2023-11-21 NOTE — PLAN OF CARE
"[facility] - Med Surg  Discharge Final Note    Primary Care Provider: Pam Soliman MD    Expected Discharge Date: 11/21/2023    Pharmacist will go over home medications and reasons for medications. VN and bedside nurse to reiterate final discharge instructions.     Cleared from CM . Bedside Nurse and VN notified.    Pt to DC home with family. Family to transport at DC. Pt is active in MyChart/Pt Portal and to expedite DC, pt can follow up there to get hospital follow up date/time if they do not want to wait for it to be added to discharge paperwork at the time of discharge.       Final Discharge Note (most recent)       Final Note - 11/21/23 1035          Final Note    Assessment Type Final Discharge Note (P)      Anticipated Discharge Disposition Home or Self Care (P)      Hospital Resources/Appts/Education Provided Appointments scheduled and added to AVS (P)         Post-Acute Status    Post-Acute Authorization Other (P)      Other Status Awaiting f/u Appts (P)    FU with PCP and Gen Sx requested.    Discharge Delays None known at this time (P)                    Future Appointments   Date Time Provider Department Center   11/27/2023  2:00 PM Chance Mensah MD [facility] HEM ONC [facility]   12/4/2023  8:15 AM [facility] [place]   12/13/2023  3:30 PM Kenna Medrano MD [facility] HEPAT [place]     BP (!) 117/57 (BP Location: Right arm, Patient Position: Lying)   Pulse 78   Temp 98.8 °F (37.1 °C) (Oral)   Resp 17   Ht 5' 6" (1.676 m)   Wt 79.3 kg (174 lb 13.2 oz)   SpO2 (!) 92%   Breastfeeding No   BMI 28.22 kg/m²       Contact Info       Pam Soliman MD   Specialty: Endocrinology   Relationship: PCP - General    [facility] [facility] [address]  [facility] [place] [place] [address]   Phone: [phone]       Next Steps: Go in 1 week(s)    Instructions: FOLLOW UP WITH PCP, Appointment to be scheduled and added to Nikolai Mills MD   Specialty: General Surgery, Surgery    [address]  [address]  [place] [address]   Phone: [phone]       Next Steps: Go in 1 week(s)    Instructions: FOLLOW UP WITH GENERAL SURGERY AFTER DISCHARGE, [facility]             Medication List        START taking these medications      amoxicillin-clavulanate 875-125mg 875-125 mg per tablet  Commonly known as: AUGMENTIN  Take 1 tablet by mouth 2 (two) times daily.            CHANGE how you take these medications      glipiZIDE 5 MG tablet  Commonly known as: GLUCOTROL  Take 1 tablet (5 mg total) by mouth 2 (two) times daily.  What changed: Another medication with the same name was removed. Continue taking this medication, and follow the directions you see here.     omeprazole 20 MG capsule  Commonly known as: PRILOSEC  Take 1 capsule by mouth daily  What changed: additional instructions            CONTINUE taking these medications      blood sugar diagnostic Strp  Commonly known as: CONTOUR TEST STRIPS  Check blood sugar twice weekly plus as directed by MD     carvediloL 6.25 MG tablet  Commonly known as: COREG  Take 1 tablet (6.25 mg total) by mouth 2 (two) times daily with meals.     celecoxib 100 MG capsule  Commonly known as: CeleBREX  take 1 Capsule by mouth  twice daily as needed     cevimeline 30 mg capsule  Commonly known as: EVOXAC  Take 1 capsule (30 mg total) by mouth 3 (three) times daily.     FLUAD QUAD 2021-22(65Y UP)(PF) 60 mcg (15 mcg x 4)/0.5 mL Syrg  Generic drug: flu vac 2021 65up-[id number](PF)  inject by [facility]     hydrALAZINE 25 MG tablet  Commonly known as: APRESOLINE  Take 1 tablet (25 mg total) by mouth 3 (three) times daily.     levothyroxine 25 MCG tablet  Commonly known as: SYNTHROID  Take 1 tablet (25 mcg total) by mouth before breakfast.     promethazine-dextromethorphan 6.25-15 mg/5 mL Syrp  Commonly known as: PROMETHAZINE-DM  Take 5 mLs by mouth nightly as needed (cough).     RYBELSUS 3 mg tablet  Generic drug: semaglutide  Take 1 tablet (3 mg total) by mouth once  daily.     sodium,potassium,mag sulfates 17.5-3.13-1.6 gram Solr  Commonly known as: SUPREP BOWEL PREP KIT  use as directed     triamcinolone 55 mcg nasal inhaler  Commonly known as: NASACORT  2 sprays by Nasal route once daily.            ASK your doctor about these medications      augmented betamethasone 0.05 % gel  Commonly known as: DIPROLENE  Apply topically to the affected area 4 to 5 times a day while having symptoms     tobramycin sulfate 0.3% 0.3 % ophthalmic solution  Commonly known as: TOBREX               Where to Get Your Medications        These medications were sent to [facility] [place]  [address], [place] [address]      Hours: Mon-Fri, 8a-5:30p Phone: [phone]   amoxicillin-clavulanate 875-125mg 875-125 mg per tablet

## 2023-11-21 NOTE — ED NOTES
Patient presents to the ED with c/o abd pain, nausea and vomiting, fever, chills, and headache. Patient states symptoms began last night. States she was feeling very exhausted throughout the day yesterday; but awoke at 10pm with worsening symptoms. Patient denies any known ill contacts. Denies abd pain currently. States only complaint is nausea currently. Patient and family updated on care plan. Vitals stable. Aware of urine order; however, states unable to void at this time. Patient denies needs. Safety intact. Medicated per MAR. Awaiting further orders.

## 2023-11-21 NOTE — ED PROVIDER NOTES
Encounter Date: 11/20/2023       History     Chief Complaint   Patient presents with    Abdominal Pain     Intermittent epigastric discomfort with nausea and emesis with last episode of emesis pta. + fever with last dose approx. 6-8 hours ago. 100.4 on arrival.      The patient is an 83-year-old female who came to the emergency department with abdominal pain for the past 2 days.  She is started running a fever 24 hours ago and has had vomiting intermittently for the past 24 hours.  Pain is in the epigastric region.  Simply had new onset anemia and had an upper and lower GI scope.  She had a small area of bleeding cauterized in her stomach.        Review of patient's allergies indicates:  No Known Allergies  Past Medical History:   Diagnosis Date    Allergy     Arthritis     Cataract     Diabetes mellitus, type 2     Hypertension     Thyroid disease      Past Surgical History:   Procedure Laterality Date    CATARACT EXTRACTION W/  INTRAOCULAR LENS IMPLANT Right 05/16/2022    Procedure: EXTRACTION, CATARACT, WITH IOL INSERTION;  Surgeon: Tami Zarate MD;  Location: [facility] OR;  Service: Ophthalmology;  Laterality: Right;  ORA ONLY FOR CYL MEASUREMENT/TORIC DECISION    CATARACT EXTRACTION W/  INTRAOCULAR LENS IMPLANT Left 06/20/2022    Procedure: EXTRACTION, CATARACT, WITH IOL INSERTION;  Surgeon: Tami Zarate MD;  Location: [facility] OR;  Service: Ophthalmology;  Laterality: Left;  ORA ONLY FOR CYL MEASUREMENT/TORIC DECISION    COLONOSCOPY      COLONOSCOPY N/A 9/18/2023    Procedure: COLONOSCOPY;  Surgeon: Dov Michele MD;  Location: [facility];  Service: Endoscopy;  Laterality: N/A;    ESOPHAGOGASTRODUODENOSCOPY N/A 9/18/2023    Procedure: EGD (ESOPHAGOGASTRODUODENOSCOPY);  Surgeon: Dov Mihcele MD;  Location: [facility];  Service: Endoscopy;  Laterality: N/A;    HEMORRHOID SURGERY       History reviewed. No pertinent family history.  Social History     Tobacco Use    Smoking status: Former    Smokeless  tobacco: Never   Substance Use Topics    Alcohol use: No     Alcohol/week: 0.0 standard drinks of alcohol    Drug use: No     Review of Systems   Constitutional:  Negative for fever.   HENT:  Negative for sore throat.    Respiratory:  Negative for shortness of breath.    Cardiovascular:  Negative for chest pain.   Gastrointestinal:  Positive for abdominal pain, nausea and vomiting. Negative for constipation and diarrhea.   Genitourinary:  Negative for dysuria.   Musculoskeletal:  Negative for back pain.   Skin:  Negative for rash.   Neurological:  Negative for weakness.   Hematological:  Does not bruise/bleed easily.       Physical Exam     Initial Vitals   BP Pulse Resp Temp SpO2   11/20/23 1853 11/20/23 1853 11/20/23 1853 11/20/23 1854 11/20/23 1853   (!) 186/84 85 18 (!) 100.4 °F (38 °C) 95 %      MAP       --                Physical Exam    Nursing note and vitals reviewed.  Constitutional: She appears well-developed and well-nourished.   HENT:   Head: Normocephalic and atraumatic.   Mouth/Throat: Oropharynx is clear and moist.   Eyes: Conjunctivae and EOM are normal. Pupils are equal, round, and reactive to light.   Neck: Neck supple.   Normal range of motion.  Cardiovascular:  Normal rate, regular rhythm, normal heart sounds and intact distal pulses.     Exam reveals no gallop and no friction rub.       No murmur heard.  Pulmonary/Chest: Breath sounds normal.   Abdominal: Abdomen is soft. Bowel sounds are normal. She exhibits no distension. There is no abdominal tenderness. There is no rebound and no guarding.   Musculoskeletal:         General: No tenderness or edema. Normal range of motion.      Cervical back: Normal range of motion and neck supple.     Lymphadenopathy:     She has no cervical adenopathy.   Neurological: She is alert and oriented to person, place, and time. She has normal strength and normal reflexes.   Skin: Skin is warm and dry.   Psychiatric: She has a normal mood and affect. Her behavior  is normal. Judgment and thought content normal.         ED Course   Procedures  Labs Reviewed   CBC W/ AUTO DIFFERENTIAL - Abnormal; Notable for the following components:       Result Value    RDW 16.9 (*)     Platelets 93 (*)     Lymph # 0.2 (*)     Gran % 89.7 (*)     Lymph % 3.9 (*)     All other components within normal limits   COMPREHENSIVE METABOLIC PANEL - Abnormal; Notable for the following components:    CO2 18 (*)     Glucose 136 (*)     Calcium 8.5 (*)     Total Bilirubin 1.5 (*)     All other components within normal limits   URINALYSIS, REFLEX TO URINE CULTURE - Abnormal; Notable for the following components:    Protein, UA Trace (*)     Ketones, UA 1+ (*)     Occult Blood UA Trace (*)     Leukocytes, UA Trace (*)     All other components within normal limits    Narrative:     Specimen Source->Urine   URINALYSIS MICROSCOPIC - Abnormal; Notable for the following components:    RBC, UA 5 (*)     All other components within normal limits    Narrative:     Specimen Source->Urine   INFLUENZA A & B BY MOLECULAR   CULTURE, BLOOD   CULTURE, BLOOD   LIPASE   LACTIC ACID, PLASMA   PROCALCITONIN   SARS-COV-2 RDRP GENE          Imaging Results              CT Abdomen Pelvis With IV Contrast (Final result)  Result time 11/20/23 21:01:41      Final result by Warren Jones DO (11/20/23 21:01:41)                   Impression:      Cholelithiasis and gallbladder wall thickening/pericholecystic fluid which may be reactive to hepatic dysfunction.  If there is further concern for acute cholecystitis, consider HIDA scan.    Hepatic cirrhosis and findings of portal hypertension including mild ascites, upper abdominal varices, and splenomegaly.    Mild diffuse small bowel wall thickening, compatible with portal enteropathy.      Electronically signed by: Warren Jones  Date:    11/20/2023  Time:    21:01               Narrative:    EXAMINATION:  CT ABDOMEN PELVIS WITH IV CONTRAST    CLINICAL HISTORY:  Epigastric  pain;    TECHNIQUE:  Axial CT images with sagittal and coronal reformats were obtained of the abdomen and pelvis from the hemidiaphragms through the symphysis pubis after the administration of 75mL Omnipaque 350.    COMPARISON:  Abdominal ultrasound from earlier the same date.    FINDINGS:  Lung Bases: Clear.    Heart: Heart size is normal.  No pericardial effusion.    Liver: The liver demonstrates a nodular contour with enlargement of the caudate lobe, compatible with morphologic changes of hepatic cirrhosis.  There are findings of portal hypertension including mild abdominal ascites and upper abdominal varices (including paraesophageal varices).  There are no focal hepatic lesions.  The portal vasculature is patent.    Biliary tract: No intrahepatic or extrahepatic biliary ductal dilatation.    Gallbladder: No radiodense gallstone. No wall thickening or pericholecystic fluid.    Pancreas: Normal. No pancreatic ductal dilatation.    Spleen: Spleen is enlarged, measuring 14 cm.  No focal lesions.    Adrenals: Unremarkable.    Kidneys and urinary collecting systems: There are multiple simple cysts in the bilateral kidneys.  No hydronephrosis or urolithiasis.    Lymph nodes: None enlarged.    Stomach and bowel: The stomach is normal.  There is mild diffuse small bowel wall thickening, compatible with portal enteropathy.  The appendix is normal.  There is no bowel obstruction.    Peritoneum and mesentery: There is mild abdominopelvic ascites.  There is no focal fluid collection or free intraperitoneal air.    Vasculature: There is moderate atherosclerosis.  There is no aneurysm.    Urinary bladder: No wall thickening.    Reproductive organs: The uterus and adnexae are unremarkable.    Body wall: No abnormality.    Musculoskeletal: No aggressive osseous lesion.  There are multilevel degenerative changes.  There is grade 1 anterolisthesis of L4 on L5.                                       US Abdomen Limited (Final  result)  Result time 11/20/23 20:24:14      Final result by Warren Jones DO (11/20/23 20:24:14)                   Impression:      Cholelithiasis with gallbladder wall thickening and pericholecystic fluid.  Findings are concerning for acute, calculus cholecystitis.      Electronically signed by: Warren Jones  Date:    11/20/2023  Time:    20:24               Narrative:    EXAMINATION:  US ABDOMEN LIMITED    CLINICAL HISTORY:  Abdominal Pain - Gallbladder;    TECHNIQUE:  Limited ultrasound of the right upper quadrant of the abdomen (including pancreas, liver, gallbladder, common bile duct, and right kidney) was performed.    COMPARISON:  None.    FINDINGS:  Liver: Partially imaged portions are unremarkable.    Gallbladder: There are multiple gallstones, with gallbladder wall thickening and pericholecystic fluid.  Sonographic De Leon sign is negative.    Biliary system: The common duct is not dilated, measuring 2 mm.  No intrahepatic ductal dilatation.    Pancreas: Partially obscured by bowel gas.    Right kidney: No hydronephrosis.    Miscellaneous: No upper abdominal ascites.                                       X-Ray Chest 1 View (Final result)  Result time 11/20/23 19:45:13      Final result by Warren Jones DO (11/20/23 19:45:13)                   Impression:      No acute abnormality.      Electronically signed by: Warren Jones  Date:    11/20/2023  Time:    19:45               Narrative:    EXAMINATION:  XR CHEST 1 VIEW    CLINICAL HISTORY:  Fever, unspecified    TECHNIQUE:  Single frontal view of the chest was performed.    COMPARISON:  10/31/2022.    FINDINGS:  The lungs are well expanded and clear. No focal opacities are seen. The pleural spaces are clear. The cardiac silhouette is unremarkable.  There are calcifications of the aortic arch.  The visualized osseous structures demonstrate degenerative changes.                                       Medications   sodium chloride 0.9% bolus 500 mL  500 mL (500 mLs Intravenous New Bag 11/20/23 2058)   ondansetron 4 mg/5 mL solution 4 mg (has no administration in time range)   ondansetron injection 4 mg (4 mg Intravenous Given 11/20/23 1921)   sodium chloride 0.9% bolus 500 mL 500 mL (0 mLs Intravenous Stopped 11/20/23 2007)   piperacillin-tazobactam (ZOSYN) 4.5 g in dextrose 5 % in water (D5W) 100 mL IVPB (MB+) (4.5 g Intravenous New Bag 11/20/23 2058)   iohexoL (OMNIPAQUE 350) injection 75 mL (75 mLs Intravenous Given 11/20/23 2048)     Medical Decision Making  Differential diagnosis includes cholecystitis, COVID, influenza, gastroenteritis, UTI, pneumonia    Medical decision-making:  The patient is an 83-year-old female with fever for the past 24 hours and nausea vomiting for the past 24 hours.  Has had chills as well.  The patient will be worked up including labs, urinalysis, chest x-ray, gallbladder ultrasound, CT abdomen pelvis.  Awaiting creatinine prior to ordering the CT scan.  The patient will be signed out at 8:00 p.m. the oncoming physician.    Amount and/or Complexity of Data Reviewed  Labs: ordered.  Radiology: ordered. Decision-making details documented in ED Course.    Risk  Prescription drug management.               ED Course as of 11/20/23 2130 Mon Nov 20, 2023 2044 US Abdomen Limited  Right upper quadrant ultrasound concerning for cholecystitis, given patient was febrile will obtain blood cultures and start empiric antibiotics.  Discussed case with Dr. Swartz who accepted patient to his service for possible operative management.  [AS]   2104 After review of the patient's physical exam, ED testing, and history/symptoms, the patient requires additional care in the hospital for the treatment of cholecystitis . Discussed patients case with Dr. Price who will accept the patient and any pending labs/imaging/interventions.   I discussed the objective findings with the patient including laboratory studies, diagnostic imaging, and any  consultant involvement. The patient/ family was educated on their clinical presentation and all questions were answered. They acknowledged and verbally agreed to the treatment plan. The patient will be admitted to the hospital for further management.    [AS]      ED Course User Index  [AS] Apryl Caballero MD                        Clinical Impression:  Final diagnoses:  [R50.9] Fever          ED Disposition Condition    Observation                 Apryl Caballero MD  11/20/23 [id number]

## 2023-11-21 NOTE — PLAN OF CARE
VN reviewed discharge instructions with pt. Using teach back method.  AVS printed and handed to pt by bedside nurse.  Reviewed follow-up appointments, medications, diet, and importance of medication compliance.  Reviewed home care instructions, treatment plan, self-management, and when to seek medical attention.  Allowed time for questions.  All questions answered.  Patient verbalized complete understanding of discharge instructions and voices no concerns.     Discharge instructions complete.  Bedside delivery done. Waiting on flu vaccine..  Bedside nurse notified.

## 2023-11-22 NOTE — DISCHARGE SUMMARY
[facility] Surg  General Surgery  Discharge Summary      Patient Name: Ning Rodriguez  MRN: [id number]  Admission Date: 11/20/2023  Hospital Length of Stay: 0 days  Discharge Date and Time: 11/21/2023 12:26 PM  Attending Physician: No att. providers found   Discharging Provider: Nikolai Price MD  Primary Care Provider: Pam Soliman MD     HPI:  83-year-old female admitted through the emergency room with history of sudden onset of abdominal pain associated nausea and vomiting patient ate sushi and also ate fried chicken in a restaurant and followed by patient started having nausea and vomiting and mild fever patient was in [facility] patient flew back to to [facility] and came to the emergency room patient denies any hematemesis melena no other complaints surgery was only bilateral tubal ligation.    * No surgery found *     Hospital Course:  Patient admitted diet with the abdominal pain nausea and vomiting patient underwent workup patient underwent   CT scan abdominal ultrasound with possible consistency with cholelithiasis patient also gave history of cirrhosis liver with mild ascites with signs of portal hypertension patient on the CT scan did show some free fluid and some swelling of the small bowel with possible enteritis patient under was admitted the diagnoses of abdominal pain rule out acute cholecystitis patient underwent HIDA scan which was normal did not show any any dilatation of of common bile duct patient had cystic duct patent with normal visualization of the gallbladder patient was asymptomatic patient was hungry wanted to eat patient was given choice of cholecystectomy but with increased complication secondary to bleeding because of the cirrhosis of the liver since patient white count was normal HIDA scan was negative was decided to discharge the patient and follow as an outpatient patient to come to the emergency room if he had any recurrent symptoms.  Consults:      Significant Diagnostic Studies: Labs: CMP   Recent Labs   Lab 11/20/23 1914      K 4.3      CO2 18*   *   BUN 16   CREATININE 0.8   CALCIUM 8.5*   PROT 7.1   ALBUMIN 3.7   BILITOT 1.5*   ALKPHOS 70   AST 29   ALT 31   ANIONGAP 12    and CBC   Recent Labs   Lab 11/20/23 1914   WBC 5.63   HGB 13.5   HCT 40.5   PLT 93*     Radiology: CT scan: CT ABDOMEN PELVIS WITH CONTRAST:   Results for orders placed or performed during the hospital encounter of 11/20/23   CT Abdomen Pelvis With IV Contrast    Narrative    EXAMINATION:  CT ABDOMEN PELVIS WITH IV CONTRAST    CLINICAL HISTORY:  Epigastric pain;    TECHNIQUE:  Axial CT images with sagittal and coronal reformats were obtained of the abdomen and pelvis from the hemidiaphragms through the symphysis pubis after the administration of 75mL Omnipaque 350.    COMPARISON:  Abdominal ultrasound from earlier the same date.    FINDINGS:  Lung Bases: Clear.    Heart: Heart size is normal.  No pericardial effusion.    Liver: The liver demonstrates a nodular contour with enlargement of the caudate lobe, compatible with morphologic changes of hepatic cirrhosis.  There are findings of portal hypertension including mild abdominal ascites and upper abdominal varices (including paraesophageal varices).  There are no focal hepatic lesions.  The portal vasculature is patent.    Biliary tract: No intrahepatic or extrahepatic biliary ductal dilatation.    Gallbladder: No radiodense gallstone. No wall thickening or pericholecystic fluid.    Pancreas: Normal. No pancreatic ductal dilatation.    Spleen: Spleen is enlarged, measuring 14 cm.  No focal lesions.    Adrenals: Unremarkable.    Kidneys and urinary collecting systems: There are multiple simple cysts in the bilateral kidneys.  No hydronephrosis or urolithiasis.    Lymph nodes: None enlarged.    Stomach and bowel: The stomach is normal.  There is mild diffuse small bowel wall thickening, compatible with portal  enteropathy.  The appendix is normal.  There is no bowel obstruction.    Peritoneum and mesentery: There is mild abdominopelvic ascites.  There is no focal fluid collection or free intraperitoneal air.    Vasculature: There is moderate atherosclerosis.  There is no aneurysm.    Urinary bladder: No wall thickening.    Reproductive organs: The uterus and adnexae are unremarkable.    Body wall: No abnormality.    Musculoskeletal: No aggressive osseous lesion.  There are multilevel degenerative changes.  There is grade 1 anterolisthesis of L4 on L5.      Impression    Cholelithiasis and gallbladder wall thickening/pericholecystic fluid which may be reactive to hepatic dysfunction.  If there is further concern for acute cholecystitis, consider HIDA scan.    Hepatic cirrhosis and findings of portal hypertension including mild ascites, upper abdominal varices, and splenomegaly.    Mild diffuse small bowel wall thickening, compatible with portal enteropathy.      Electronically signed by: Warren Jones  Date:    11/20/2023  Time:    21:01     Ultrasound:   cholelithiasis  Nuclear Medicine: normal hidascna    Pending Diagnostic Studies:       None          Final Active Diagnoses:    Diagnosis Date Noted POA    PRINCIPAL PROBLEM:  Calculus of gallbladder with chronic cholecystitis without obstruction [K80.10] 11/21/2023 Yes    Pain of upper abdomen [R10.10] 11/21/2023 Yes    Bilious vomiting with nausea [R11.14] 11/21/2023 Yes      Problems Resolved During this Admission:      Discharged Condition: good    Disposition: Home or Self Care    Follow Up:   Follow-up Information       Pam Soliman MD. Go in 1 week(s).    Specialty: Endocrinology  Why: FOLLOW UP WITH PCP, Appointment to be scheduled and added to MyChart  Contact information:  [address] [place] [address] [id number] [phone]               Nikolai Price MD. Go in 1 week(s).    Specialties: General Surgery, Surgery  Why: FOLLOW UP WITH GENERAL  SURGERY AFTER DISCHARGE, Appointment to be scheduled and added to [facility]  Contact information:  [address]  [address]  [place] [address] [phone]                           Patient Instructions:   No discharge procedures on file.  Medications:  Reconciled Home Medications:      Medication List        START taking these medications      amoxicillin-clavulanate 875-125mg 875-125 mg per tablet  Commonly known as: AUGMENTIN  Take 1 tablet by mouth 2 (two) times daily.            CHANGE how you take these medications      glipiZIDE 5 MG tablet  Commonly known as: GLUCOTROL  Take 1 tablet (5 mg total) by mouth 2 (two) times daily.  What changed: Another medication with the same name was removed. Continue taking this medication, and follow the directions you see here.     omeprazole 20 MG capsule  Commonly known as: PRILOSEC  Take 1 capsule by mouth daily  What changed: additional instructions            CONTINUE taking these medications      blood sugar diagnostic Strp  Commonly known as: CONTOUR TEST STRIPS  Check blood sugar twice weekly plus as directed by MD     carvediloL 6.25 MG tablet  Commonly known as: COREG  Take 1 tablet (6.25 mg total) by mouth 2 (two) times daily with meals.     celecoxib 100 MG capsule  Commonly known as: CeleBREX  take 1 Capsule by mouth  twice daily as needed     cevimeline 30 mg capsule  Commonly known as: EVOXAC  Take 1 capsule (30 mg total) by mouth 3 (three) times daily.     FLUAD QUAD 2021-22(65Y UP)(PF) 60 mcg (15 mcg x 4)/0.5 mL Syrg  Generic drug: flu vac 2021 65up-cvlAE99B(PF)  inject by [facility]     hydrALAZINE 25 MG tablet  Commonly known as: APRESOLINE  Take 1 tablet (25 mg total) by mouth 3 (three) times daily.     levothyroxine 25 MCG tablet  Commonly known as: SYNTHROID  Take 1 tablet (25 mcg total) by mouth before breakfast.     promethazine-dextromethorphan 6.25-15 mg/5 mL Syrp  Commonly known as: PROMETHAZINE-DM  Take 5 mLs by mouth nightly as needed (cough).      RYBELSUS 3 mg tablet  Generic drug: semaglutide  Take 1 tablet (3 mg total) by mouth once daily.     sodium,potassium,mag sulfates 17.5-3.13-1.6 gram Solr  Commonly known as: SUPREP BOWEL PREP KIT  use as directed     triamcinolone 55 mcg nasal inhaler  Commonly known as: NASACORT  2 sprays by Nasal route once daily.            ASK your doctor about these medications      augmented betamethasone 0.05 % gel  Commonly known as: DIPROLENE  Apply topically to the affected area 4 to 5 times a day while having symptoms     tobramycin sulfate 0.3% 0.3 % ophthalmic solution  Commonly known as: TOBREX  SMARTSI-2 Drop(s) In Eye(s) Every 4 Hours              Nikolai Price MD  General Surgery  [facility] Surg

## 2023-11-26 LAB
BACTERIA BLD CULT: NORMAL
BACTERIA BLD CULT: NORMAL

## 2023-11-26 NOTE — PROGRESS NOTES
PATIENT: Ning Rodriguez  MRN: [id number]  DATE: 11/27/2023    Diagnosis:   1. Iron deficiency    2. Secondary thrombocytopenia    3. Cirrhosis of liver without ascites, unspecified hepatic cirrhosis type    4. Type 2 diabetes mellitus with hyperglycemia, without long-term current use of insulin    5. Hyperbilirubinemia      Chief Complaint: thrombocytopenia      Subjective:    History of Present Illness: Ms. Rodriguez is a 83 y.o. female who presented in September 2023 for evaluation and management of pancytopenia. She was referred by Dr. Rodriguez.    - labs on 8/23/23 reveal a neutropenia, mild anemia, and thrombocytopenia.  - iron studies (8/23/23) revealed a low-normal ferritin with elevated total iron binding capacity.  - today, she endorses intermittent cough, constipation, gastritis-type symptoms.  - she denies fever, unintentional weight loss.  - she denies gastrointestinal bleeding.  - she has been taking oral iron for the past 1-2 weeks.      Interval history:  - she presents for a follow-up appointment for her cytopenias.  - she underwent upper GI endoscopy and colonoscopy on 9/18/23.  - she was hospitalized in November 2023 with severe abdominal pain. She underwent CT scan on 11/20/23.  - today, she is doing well. She denies shortness of breath, chest pain, nausea, vomiting, diarrhea.        Past medical, surgical, family, and social histories have been reviewed and updated below.    Past Medical History:   Past Medical History:   Diagnosis Date    Allergy     Arthritis     Cataract     Diabetes mellitus, type 2     Hypertension     Thyroid disease        Past Surgical History:   Past Surgical History:   Procedure Laterality Date    CATARACT EXTRACTION W/  INTRAOCULAR LENS IMPLANT Right 05/16/2022    Procedure: EXTRACTION, CATARACT, WITH IOL INSERTION;  Surgeon: Tami Zarate MD;  [facility]: [facility];  Service: Ophthalmology;  Laterality: Right;  ORA ONLY FOR CYL MEASUREMENT/TORIC DECISION    CATARACT EXTRACTION  W/  INTRAOCULAR LENS IMPLANT Left 06/20/2022    Procedure: EXTRACTION, CATARACT, WITH IOL INSERTION;  Surgeon: Tami Zarate MD;  Location: [facility] [place], operated by [facility] OR;  Service: Ophthalmology;  Laterality: Left;  ORA ONLY FOR CYL MEASUREMENT/TORIC DECISION    COLONOSCOPY      COLONOSCOPY N/A 9/18/2023    Procedure: COLONOSCOPY;  Surgeon: Dov Michele MD;  Location: [facility] ENDO;  Service: Endoscopy;  Laterality: N/A;    ESOPHAGOGASTRODUODENOSCOPY N/A 9/18/2023    Procedure: EGD (ESOPHAGOGASTRODUODENOSCOPY);  Surgeon: Dov Michele MD;  Location: [facility] ENDO;  Service: Endoscopy;  Laterality: N/A;    HEMORRHOID SURGERY         Family History: No family history on file.    Social History:  reports that she has quit smoking. She has never used smokeless tobacco. She reports that she does not drink alcohol and does not use drugs.    Allergies:  Review of patient's allergies indicates:  No Known Allergies    Medications:  Current Outpatient Medications   Medication Sig Dispense Refill    amoxicillin-clavulanate 875-125mg (AUGMENTIN) 875-125 mg per tablet Take 1 tablet by mouth 2 (two) times daily. 30 tablet 1    augmented betamethasone (DIPROLENE) 0.05 % gel Apply topically to the affected area 4 to 5 times a day while having symptoms 50 g 0    blood sugar diagnostic (CONTOUR TEST STRIPS) Strp Check blood sugar twice weekly plus as directed by  each 6    carvediloL (COREG) 6.25 MG tablet Take 1 tablet (6.25 mg total) by mouth 2 (two) times daily with meals. 60 tablet 11    celecoxib (CELEBREX) 100 MG capsule take 1 Capsule by mouth  twice daily as needed 180 capsule 1    cevimeline (EVOXAC) 30 mg capsule Take 1 capsule (30 mg total) by mouth 3 (three) times daily. 90 capsule 11    flu vac 2021 65up-bcsDY79G,PF, (FLUAD QUAD 2021-22,65Y UP,,PF,) 60 mcg (15 mcg x 4)/0.5 mL Syrg inject by [facility] 0.5 mL 0    glipiZIDE (GLUCOTROL) 5 MG tablet Take 1 tablet (5 mg total) by mouth 2 (two) times daily. 180 tablet 1    hydrALAZINE  (APRESOLINE) 25 MG tablet Take 1 tablet (25 mg total) by mouth 3 (three) times daily. 270 tablet 6    levothyroxine (SYNTHROID) 25 MCG tablet Take 1 tablet (25 mcg total) by mouth before breakfast. 30 tablet 5    omeprazole (PRILOSEC) 20 MG capsule Take 1 capsule by mouth daily 90 capsule 3    promethazine-dextromethorphan (PROMETHAZINE-DM) 6.25-15 mg/5 mL Syrp Take 5 mLs by mouth nightly as needed (cough). 180 mL 0    semaglutide (RYBELSUS) 3 mg tablet Take 1 tablet (3 mg total) by mouth once daily. 90 tablet 4    sodium,potassium,mag sulfates (SUPREP BOWEL PREP KIT) 17.5-3.13-1.6 gram SolR use as directed 354 mL 0    tobramycin sulfate 0.3% (TOBREX) 0.3 % ophthalmic solution SMARTSI-2 Drop(s) In Eye(s) Every 4 Hours      triamcinolone (NASACORT) 55 mcg nasal inhaler 2 sprays by Nasal route once daily. 10.8 g 0     Current Facility-Administered Medications   Medication Dose Route Frequency Provider Last Rate Last Admin    sodium chloride 0.9% flush 10 mL  10 mL Intravenous PRN Tami Zarate MD           Review of Systems   Constitutional:  Negative for fatigue.   HENT:  Negative for sore throat.    Eyes:  Negative for visual disturbance.   Respiratory:  Positive for cough. Negative for shortness of breath.    Cardiovascular:  Negative for chest pain.   Gastrointestinal:  Positive for constipation. Negative for diarrhea, nausea and vomiting.   Genitourinary:  Negative for dysuria.   Musculoskeletal:  Negative for back pain.   Skin:  Negative for rash.   Neurological:  Negative for headaches.   Hematological:  Negative for adenopathy.   Psychiatric/Behavioral:  The patient is not nervous/anxious.        ECOG Performance Status:   ECOG SCORE    1 - Restricted in strenuous activity-ambulatory and able to carry out work of a light nature         Objective:      Vitals:   Vitals:    23 1352   BP: (!) 115/53   BP Location: Right arm   Patient Position: Sitting   BP Method: Medium (Automatic)   Pulse: 62   Resp:  18   SpO2: 96%   Weight: 80.3 kg (177 lb 0.5 oz)     BMI: Body mass index is 28.57 kg/m².    Physical Exam  Vitals and nursing note reviewed.   Constitutional:       Appearance: She is well-developed.   HENT:      Head: Normocephalic and atraumatic.   Eyes:      Pupils: Pupils are equal, round, and reactive to light.   Cardiovascular:      Rate and Rhythm: Normal rate and regular rhythm.   Pulmonary:      Effort: Pulmonary effort is normal.      Breath sounds: Normal breath sounds.   Abdominal:      General: Bowel sounds are normal.      Palpations: Abdomen is soft.   Musculoskeletal:         General: Normal range of motion.      Cervical back: Normal range of motion and neck supple.   Skin:     General: Skin is warm and dry.   Neurological:      Mental Status: She is alert and oriented to person, place, and time.   Psychiatric:         Behavior: Behavior normal.         Thought Content: Thought content normal.         Judgment: Judgment normal.         Laboratory Data:  Labs have been reviewed.    Lab Results   Component Value Date    WBC 5.63 11/20/2023    HGB 13.5 11/20/2023    HCT 40.5 11/20/2023    MCV 90 11/20/2023    PLT 93 (L) 11/20/2023               Diagnostics:    CT abdomen/pelvis (11/20/23): I have personally reviewed the images    Cholelithiasis and gallbladder wall thickening/pericholecystic fluid which may be reactive to hepatic dysfunction.  If there is further concern for acute cholecystitis, consider HIDA scan.     Hepatic cirrhosis and findings of portal hypertension including mild ascites, upper abdominal varices, and splenomegaly.     Mild diffuse small bowel wall thickening, compatible with portal enteropathy.      upper GI endoscopy (9/18/23):  - Esophageal and gastric varices   - Portal hypertensive gastropathy   - Two small angioectasias in the gastric body - ablated with APC     colonoscopy (9/18/23):  - Multiple (15) angioectasia distributed throughout the colon - these lesions are  somewhat diffuse and not compelling as the primary source of GI blood loss   - Otherwise unrevealing colonoscopy including the TI in a patient with a new diagnosis of AUDELIA       Assessment:       1. Iron deficiency    2. Secondary thrombocytopenia    3. Cirrhosis of liver without ascites, unspecified hepatic cirrhosis type    4. Type 2 diabetes mellitus with hyperglycemia, without long-term current use of insulin           Plan:     Iron deficiency / thrombocytopenia / cirrhosis  - I have reviewed her chart  - labs on 8/23/23 reveal a neutropenia, mild anemia, and thrombocytopenia.  - iron studies (8/23/23) revealed a low-normal ferritin with elevated total iron binding capacity, consistent with iron deficiency.  - she has been taking oral iron  - upper GI endoscopy (9/18/23) revealed gastric body angiectasias, varices, and portal hypertensive gastropathy  - colonoscopy (9/18/23) revealed multiple colonic angiectasias.  - CT abdomen/pelvis (11/20/23) revealed cirrhosis and splenomegaly  - her thrombocytopenia is likely secondary to cirrhosis and portal hypertension/splenomegaly.  - iron deficiency likely related to angiectasias  - defer management of cirrhosis to hepatology.  - continue oral iron  - return to clinic as needed.    2. Type 2 diabetes  - elevated glucose noted on metabolic panels. I do not see a hemoglobin A1c in our records.  - continue diabetic medications.  - defer management to primary care    3. Hyperbilirubinemia  - likely related to recent cholestasis/cholecystitis  - repeat CMP next week.    4. Advance Care Planning     Power of   After our discussion (at previous visit), the patient decided to complete a HCPOA and appointed her   Gume Rodriguez ([phone]) and daughters Renae Andrew ([phone]) and Susana Rodriguez ([phone]) .           - return to clinic as needed.    Chance Mensah M.D.  Hematology/Oncology  [facility] [address],  [address]  [place] [address]  Phone: [phone]  Fax: [phone]

## 2023-11-27 ENCOUNTER — OFFICE VISIT (OUTPATIENT)
Dept: HEMATOLOGY/ONCOLOGY | Facility: CLINIC | Age: 83
End: 2023-11-27
Payer: MEDICARE

## 2023-11-27 VITALS
WEIGHT: 177 LBS | DIASTOLIC BLOOD PRESSURE: 53 MMHG | BODY MASS INDEX: 28.57 KG/M2 | OXYGEN SATURATION: 96 % | RESPIRATION RATE: 18 BRPM | SYSTOLIC BLOOD PRESSURE: 115 MMHG | HEART RATE: 62 BPM

## 2023-11-27 DIAGNOSIS — K74.60 CIRRHOSIS OF LIVER WITHOUT ASCITES, UNSPECIFIED HEPATIC CIRRHOSIS TYPE: ICD-10-CM

## 2023-11-27 DIAGNOSIS — E61.1 IRON DEFICIENCY: Primary | ICD-10-CM

## 2023-11-27 DIAGNOSIS — E11.65 TYPE 2 DIABETES MELLITUS WITH HYPERGLYCEMIA, WITHOUT LONG-TERM CURRENT USE OF INSULIN: ICD-10-CM

## 2023-11-27 DIAGNOSIS — D69.59 SECONDARY THROMBOCYTOPENIA: ICD-10-CM

## 2023-11-27 DIAGNOSIS — E80.6 HYPERBILIRUBINEMIA: ICD-10-CM

## 2023-11-27 PROCEDURE — 99999 PR PBB SHADOW E&M-EST. PATIENT-LVL IV: ICD-10-PCS | Mod: PBBFAC,,, | Performed by: INTERNAL MEDICINE

## 2023-11-27 PROCEDURE — 3078F PR MOST RECENT DIASTOLIC BLOOD PRESSURE < 80 MM HG: ICD-10-PCS | Mod: CPTII,S$GLB,, | Performed by: INTERNAL MEDICINE

## 2023-11-27 PROCEDURE — 3078F DIAST BP <80 MM HG: CPT | Mod: CPTII,S$GLB,, | Performed by: INTERNAL MEDICINE

## 2023-11-27 PROCEDURE — 3288F FALL RISK ASSESSMENT DOCD: CPT | Mod: CPTII,S$GLB,, | Performed by: INTERNAL MEDICINE

## 2023-11-27 PROCEDURE — 1101F PR PT FALLS ASSESS DOC 0-1 FALLS W/OUT INJ PAST YR: ICD-10-PCS | Mod: CPTII,S$GLB,, | Performed by: INTERNAL MEDICINE

## 2023-11-27 PROCEDURE — 1157F ADVNC CARE PLAN IN RCRD: CPT | Mod: CPTII,S$GLB,, | Performed by: INTERNAL MEDICINE

## 2023-11-27 PROCEDURE — 99214 PR OFFICE/OUTPT VISIT, EST, LEVL IV, 30-39 MIN: ICD-10-PCS | Mod: S$GLB,,, | Performed by: INTERNAL MEDICINE

## 2023-11-27 PROCEDURE — 1160F RVW MEDS BY RX/DR IN RCRD: CPT | Mod: CPTII,S$GLB,, | Performed by: INTERNAL MEDICINE

## 2023-11-27 PROCEDURE — 1159F PR MEDICATION LIST DOCUMENTED IN MEDICAL RECORD: ICD-10-PCS | Mod: CPTII,S$GLB,, | Performed by: INTERNAL MEDICINE

## 2023-11-27 PROCEDURE — 1126F AMNT PAIN NOTED NONE PRSNT: CPT | Mod: CPTII,S$GLB,, | Performed by: INTERNAL MEDICINE

## 2023-11-27 PROCEDURE — 99214 OFFICE O/P EST MOD 30 MIN: CPT | Mod: S$GLB,,, | Performed by: INTERNAL MEDICINE

## 2023-11-27 PROCEDURE — 1160F PR REVIEW ALL MEDS BY PRESCRIBER/CLIN PHARMACIST DOCUMENTED: ICD-10-PCS | Mod: CPTII,S$GLB,, | Performed by: INTERNAL MEDICINE

## 2023-11-27 PROCEDURE — 1126F PR PAIN SEVERITY QUANTIFIED, NO PAIN PRESENT: ICD-10-PCS | Mod: CPTII,S$GLB,, | Performed by: INTERNAL MEDICINE

## 2023-11-27 PROCEDURE — 3074F PR MOST RECENT SYSTOLIC BLOOD PRESSURE < 130 MM HG: ICD-10-PCS | Mod: CPTII,S$GLB,, | Performed by: INTERNAL MEDICINE

## 2023-11-27 PROCEDURE — 3288F PR FALLS RISK ASSESSMENT DOCUMENTED: ICD-10-PCS | Mod: CPTII,S$GLB,, | Performed by: INTERNAL MEDICINE

## 2023-11-27 PROCEDURE — 1157F PR ADVANCE CARE PLAN OR EQUIV PRESENT IN MEDICAL RECORD: ICD-10-PCS | Mod: CPTII,S$GLB,, | Performed by: INTERNAL MEDICINE

## 2023-11-27 PROCEDURE — 1159F MED LIST DOCD IN RCRD: CPT | Mod: CPTII,S$GLB,, | Performed by: INTERNAL MEDICINE

## 2023-11-27 PROCEDURE — 1101F PT FALLS ASSESS-DOCD LE1/YR: CPT | Mod: CPTII,S$GLB,, | Performed by: INTERNAL MEDICINE

## 2023-11-27 PROCEDURE — 99999 PR PBB SHADOW E&M-EST. PATIENT-LVL IV: CPT | Mod: PBBFAC,,, | Performed by: INTERNAL MEDICINE

## 2023-11-27 PROCEDURE — 3074F SYST BP LT 130 MM HG: CPT | Mod: CPTII,S$GLB,, | Performed by: INTERNAL MEDICINE

## 2023-11-28 PROBLEM — K52.9 GASTROENTERITIS: Status: ACTIVE | Noted: 2023-11-28

## 2023-12-04 ENCOUNTER — HOSPITAL ENCOUNTER (OUTPATIENT)
Dept: RADIOLOGY | Facility: HOSPITAL | Age: 83
Discharge: HOME | End: 2023-12-04
Attending: INTERNAL MEDICINE
Payer: MEDICARE

## 2023-12-04 DIAGNOSIS — I85.00 ESOPHAGEAL VARICES WITHOUT BLEEDING, UNSPECIFIED ESOPHAGEAL VARICES TYPE: ICD-10-CM

## 2023-12-04 PROCEDURE — 76700 US EXAM ABDOM COMPLETE: CPT | Mod: TC

## 2023-12-04 PROCEDURE — 76700 US EXAM ABDOM COMPLETE: CPT | Mod: 26,,, | Performed by: RADIOLOGY

## 2023-12-04 PROCEDURE — 76700 US ABDOMEN COMPLETE: ICD-10-PCS | Mod: 26,,, | Performed by: RADIOLOGY

## 2023-12-05 ENCOUNTER — TELEPHONE (OUTPATIENT)
Dept: HEMATOLOGY/ONCOLOGY | Facility: CLINIC | Age: 83
End: 2023-12-05
Payer: MEDICARE

## 2023-12-05 NOTE — TELEPHONE ENCOUNTER
I called and reviewed her labs. Iron studies have improved.    Chance Mensah M.D.  Hematology/Oncology  [facility] [place]  [address], [address]  [place] [address]  Phone: [phone]  Fax: [phone]

## 2023-12-13 ENCOUNTER — OFFICE VISIT (OUTPATIENT)
Dept: HEPATOLOGY | Facility: CLINIC | Age: 83
End: 2023-12-13
Payer: MEDICARE

## 2023-12-13 VITALS
BODY MASS INDEX: 28.6 KG/M2 | RESPIRATION RATE: 18 BRPM | TEMPERATURE: 97 F | SYSTOLIC BLOOD PRESSURE: 138 MMHG | HEART RATE: 66 BPM | OXYGEN SATURATION: 97 % | HEIGHT: 66 IN | WEIGHT: 177.94 LBS | DIASTOLIC BLOOD PRESSURE: 64 MMHG

## 2023-12-13 DIAGNOSIS — I85.10 SECONDARY ESOPHAGEAL VARICES WITHOUT BLEEDING: ICD-10-CM

## 2023-12-13 DIAGNOSIS — K74.69 COMPENSATED LIVER DISEASE: ICD-10-CM

## 2023-12-13 DIAGNOSIS — K76.6 PORTAL HYPERTENSION: ICD-10-CM

## 2023-12-13 DIAGNOSIS — K75.81 NASH (NONALCOHOLIC STEATOHEPATITIS): ICD-10-CM

## 2023-12-13 DIAGNOSIS — R60.0 LOWER EXTREMITY EDEMA: ICD-10-CM

## 2023-12-13 DIAGNOSIS — I86.4 GASTRIC VARICES WITHOUT BLEEDING: ICD-10-CM

## 2023-12-13 PROCEDURE — 1159F MED LIST DOCD IN RCRD: CPT | Mod: CPTII,S$GLB,, | Performed by: INTERNAL MEDICINE

## 2023-12-13 PROCEDURE — 99214 OFFICE O/P EST MOD 30 MIN: CPT | Mod: S$GLB,,, | Performed by: INTERNAL MEDICINE

## 2023-12-13 PROCEDURE — 3078F PR MOST RECENT DIASTOLIC BLOOD PRESSURE < 80 MM HG: ICD-10-PCS | Mod: CPTII,S$GLB,, | Performed by: INTERNAL MEDICINE

## 2023-12-13 PROCEDURE — 1126F PR PAIN SEVERITY QUANTIFIED, NO PAIN PRESENT: ICD-10-PCS | Mod: CPTII,S$GLB,, | Performed by: INTERNAL MEDICINE

## 2023-12-13 PROCEDURE — 1157F PR ADVANCE CARE PLAN OR EQUIV PRESENT IN MEDICAL RECORD: ICD-10-PCS | Mod: CPTII,S$GLB,, | Performed by: INTERNAL MEDICINE

## 2023-12-13 PROCEDURE — 1160F RVW MEDS BY RX/DR IN RCRD: CPT | Mod: CPTII,S$GLB,, | Performed by: INTERNAL MEDICINE

## 2023-12-13 PROCEDURE — 1101F PR PT FALLS ASSESS DOC 0-1 FALLS W/OUT INJ PAST YR: ICD-10-PCS | Mod: CPTII,S$GLB,, | Performed by: INTERNAL MEDICINE

## 2023-12-13 PROCEDURE — 99999 PR PBB SHADOW E&M-EST. PATIENT-LVL V: ICD-10-PCS | Mod: PBBFAC,,, | Performed by: INTERNAL MEDICINE

## 2023-12-13 PROCEDURE — 3288F FALL RISK ASSESSMENT DOCD: CPT | Mod: CPTII,S$GLB,, | Performed by: INTERNAL MEDICINE

## 2023-12-13 PROCEDURE — 1160F PR REVIEW ALL MEDS BY PRESCRIBER/CLIN PHARMACIST DOCUMENTED: ICD-10-PCS | Mod: CPTII,S$GLB,, | Performed by: INTERNAL MEDICINE

## 2023-12-13 PROCEDURE — 3078F DIAST BP <80 MM HG: CPT | Mod: CPTII,S$GLB,, | Performed by: INTERNAL MEDICINE

## 2023-12-13 PROCEDURE — 1126F AMNT PAIN NOTED NONE PRSNT: CPT | Mod: CPTII,S$GLB,, | Performed by: INTERNAL MEDICINE

## 2023-12-13 PROCEDURE — 99214 PR OFFICE/OUTPT VISIT, EST, LEVL IV, 30-39 MIN: ICD-10-PCS | Mod: S$GLB,,, | Performed by: INTERNAL MEDICINE

## 2023-12-13 PROCEDURE — 1159F PR MEDICATION LIST DOCUMENTED IN MEDICAL RECORD: ICD-10-PCS | Mod: CPTII,S$GLB,, | Performed by: INTERNAL MEDICINE

## 2023-12-13 PROCEDURE — 99999 PR PBB SHADOW E&M-EST. PATIENT-LVL V: CPT | Mod: PBBFAC,,, | Performed by: INTERNAL MEDICINE

## 2023-12-13 PROCEDURE — 1101F PT FALLS ASSESS-DOCD LE1/YR: CPT | Mod: CPTII,S$GLB,, | Performed by: INTERNAL MEDICINE

## 2023-12-13 PROCEDURE — 3288F PR FALLS RISK ASSESSMENT DOCUMENTED: ICD-10-PCS | Mod: CPTII,S$GLB,, | Performed by: INTERNAL MEDICINE

## 2023-12-13 PROCEDURE — 3075F PR MOST RECENT SYSTOLIC BLOOD PRESS GE 130-139MM HG: ICD-10-PCS | Mod: CPTII,S$GLB,, | Performed by: INTERNAL MEDICINE

## 2023-12-13 PROCEDURE — 3075F SYST BP GE 130 - 139MM HG: CPT | Mod: CPTII,S$GLB,, | Performed by: INTERNAL MEDICINE

## 2023-12-13 PROCEDURE — 1157F ADVNC CARE PLAN IN RCRD: CPT | Mod: CPTII,S$GLB,, | Performed by: INTERNAL MEDICINE

## 2023-12-13 NOTE — PROGRESS NOTES
Hepatology Follow Up Note    Referring provider: No ref. provider found  PCP: Pam Soliman MD    Chief complaint: follow up MASH cirrhosis    Last seen in clinic on: 9/19/2023    Brief HPI:   Ning Rodriguez is a 83 y.o. 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 She is accompanied by her , Dr. Joshuae.     Interval Events:  - Feels well today.   - Reports recent gastrointestinal illness requiring admission to [facility], now recovered. This was followed by Henoch Scholein purpura, which was treated with topical steroids.  - Reports new LE edema.  - The patient denies abdominal distension, encephalopathy, jaundice, gross GI bleeding.  - This is the extent of the patient's complaints at this time.    Past Medical History:   Diagnosis Date    Allergy     Arthritis     Cataract     Diabetes mellitus, type 2     Hypertension     Thyroid disease        Past Surgical History:   Procedure Laterality Date    CATARACT EXTRACTION W/  INTRAOCULAR LENS IMPLANT Right 05/16/2022    Procedure: EXTRACTION, CATARACT, WITH IOL INSERTION;  Surgeon: Tami Zarate MD;  Location: [facility];  Service: Ophthalmology;  Laterality: Right;  ORA ONLY FOR CYL MEASUREMENT/TORIC DECISION    CATARACT EXTRACTION W/  INTRAOCULAR LENS IMPLANT Left 06/20/2022    Procedure: EXTRACTION, CATARACT, WITH IOL INSERTION;  Surgeon: Tami Zarate MD;  Location: [facility];  Service: Ophthalmology;  Laterality: Left;  ORA ONLY FOR CYL MEASUREMENT/TORIC DECISION    COLONOSCOPY      COLONOSCOPY N/A 9/18/2023    Procedure: COLONOSCOPY;  Surgeon: Dov Michele MD;  Location: [facility];  Service: Endoscopy;  Laterality: N/A;    ESOPHAGOGASTRODUODENOSCOPY N/A 9/18/2023    Procedure: EGD (ESOPHAGOGASTRODUODENOSCOPY);  Surgeon: Dov Michele MD;  Location: [facility];  Service: Endoscopy;  Laterality: N/A;    HEMORRHOID SURGERY         No family  history on file.    Social History     Tobacco Use    Smoking status: Former    Smokeless tobacco: Never   Substance Use Topics    Alcohol use: No     Alcohol/week: 0.0 standard drinks of alcohol    Drug use: No       Current Outpatient Medications   Medication Sig Dispense Refill    augmented betamethasone (DIPROLENE) 0.05 % gel Apply topically to the affected area 4 to 5 times a day while having symptoms 50 g 0    blood sugar diagnostic (CONTOUR TEST STRIPS) Strp Check blood sugar twice weekly plus as directed by  each 6    carvediloL (COREG) 6.25 MG tablet Take 1 tablet (6.25 mg total) by mouth 2 (two) times daily with meals. 60 tablet 11    celecoxib (CELEBREX) 100 MG capsule take 1 Capsule by mouth  twice daily as needed 180 capsule 1    clobetasoL (TEMOVATE) 0.05 % cream APPLY TWICE A DAY TO AFFECTED AEAS OF LEGS 60 g 1    flu vac  [id number],PF, (FLUAD QUAD -,65Y UP,,PF,) 60 mcg (15 mcg x 4)/0.5 mL Syrg inject by [facility] 0.5 mL 0    glipiZIDE (GLUCOTROL) 5 MG tablet Take 1 Tablet(s) Oral two times a day 180 tablet 1    hydrALAZINE (APRESOLINE) 25 MG tablet Take 1 tablet (25 mg total) by mouth 3 (three) times daily. 270 tablet 6    levothyroxine (SYNTHROID) 25 MCG tablet Take 1 tablet (25 mcg total) by mouth before breakfast. 30 tablet 5    omeprazole (PRILOSEC) 20 MG capsule Take 1 capsule by mouth daily 90 capsule 3    promethazine-dextromethorphan (PROMETHAZINE-DM) 6.25-15 mg/5 mL Syrp Take 5 mLs by mouth nightly as needed (cough). 180 mL 0    sodium,potassium,mag sulfates (SUPREP BOWEL PREP KIT) 17.5-3.13-1.6 gram SolR use as directed 354 mL 0    tobramycin sulfate 0.3% (TOBREX) 0.3 % ophthalmic solution SMARTSI-2 Drop(s) In Eye(s) Every 4 Hours      triamcinolone (NASACORT) 55 mcg nasal inhaler 2 sprays by Nasal route once daily. 10.8 g 0    amoxicillin-clavulanate 875-125mg (AUGMENTIN) 875-125 mg per tablet Take 1 tablet by mouth 2 (two) times daily. (Patient not taking: Reported on  "12/13/2023) 30 tablet 1    cevimeline (EVOXAC) 30 mg capsule Take 1 capsule (30 mg total) by mouth 3 (three) times daily. 90 capsule 11    semaglutide (RYBELSUS) 3 mg tablet Take 1 tablet (3 mg total) by mouth once daily. (Patient not taking: Reported on 12/13/2023) 90 tablet 4     Current Facility-Administered Medications   Medication Dose Route Frequency Provider Last Rate Last Admin    sodium chloride 0.9% flush 10 mL  10 mL Intravenous PRN Tami Zarate MD           Review of patient's allergies indicates:   Allergen Reactions    Augmentin [amoxicillin-pot clavulanate] Rash     Rash in legs            Vitals:    12/13/23 1507   BP: 138/64   Pulse: 66   Resp: 18   Temp: 97 °F (36.1 °C)   TempSrc: Oral   SpO2: 97%   Weight: 80.7 kg (177 lb 14.6 oz)   Height: 5' 6" (1.676 m)   Body mass index is 28.72 kg/m².    Physical Exam  Constitutional:       General: She is not in acute distress.  Eyes:      General: No scleral icterus.  Cardiovascular:      Rate and Rhythm: Normal rate.   Pulmonary:      Effort: Pulmonary effort is normal.   Abdominal:      General: Abdomen is protuberant. There is no distension.      Palpations: Abdomen is soft. There is no fluid wave.      Tenderness: There is no abdominal tenderness.   Musculoskeletal:      Right lower leg: No edema.      Left lower leg: No edema.   Skin:     General: Skin is warm.      Coloration: Skin is not jaundiced.   Neurological:      General: No focal deficit present.      Mental Status: She is alert and oriented to person, place, and time.      Comments: No asterixis.   Psychiatric:         Behavior: Behavior is cooperative.         Thought Content: Thought content normal.         LABS: I personally reviewed pertinent laboratory findings.    Lab Results   Component Value Date    WBC 4.00 12/04/2023    HGB 12.6 12/04/2023    HCT 39.2 12/04/2023    MCV 93 12/04/2023     (L) 12/04/2023       Lab Results   Component Value Date     12/04/2023    K 4.7 " "12/04/2023     12/04/2023    CO2 24 12/04/2023    BUN 11 12/04/2023    CREATININE 0.7 12/04/2023    CALCIUM 9.0 12/04/2023    ANIONGAP 6 (L) 12/04/2023    ESTGFRAFRICA >60 01/31/2019    EGFRNONAA >60 01/31/2019       Lab Results   Component Value Date    ALT 27 12/04/2023    AST 27 12/04/2023    ALKPHOS 73 12/04/2023    BILITOT 1.6 (H) 12/04/2023       Lab Results   Component Value Date    HEPBCAB Non-reactive 09/19/2023    HEPCAB Non-reactive 08/23/2023       No results found for: "YAZMIN", "MITOAB", "SMOOTHMUSCAB", "IGG", "CERULOPLSM"     Computed MELD 3.0 unavailable. Necessary lab results were not found in the last year.  Computed MELD-Na unavailable. Necessary lab results were not found in the last year.       IMAGING: I personally reviewed imaging studies.    CT A/P with contrast 11/20/23  Impression:     Cholelithiasis and gallbladder wall thickening/pericholecystic fluid which may be reactive to hepatic dysfunction.  If there is further concern for acute cholecystitis, consider HIDA scan.     Hepatic cirrhosis and findings of portal hypertension including mild ascites, upper abdominal varices, and splenomegaly.     Mild diffuse small bowel wall thickening, compatible with portal enteropathy.    EGD 9/18/2023  Indications:           Iron deficiency anemia   Findings:       Esophageal varices were found in the distal esophagus.        Moderate portal hypertensive gastropathy was found in the entire        examined stomach.        A single angioectasia was found in the gastric fundus. Coagulation        for hemostasis using argon plasma was successful. Estimated blood        loss was minimal.        A single angioectasia with no bleeding was found in the gastric        antrum. Coagulation for tissue destruction using argon plasma was        successful.        Type 2 gastroesophageal varices (GOV2, esophageal varices which        extend along the fundus) with no bleeding were found in the stomach.        The " examined duodenum was normal.     Assessment:  Ning Rodriguez is a 83 y.o. 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    1. Compensated liver disease    2. LKEIN (nonalcoholic steatohepatitis)    3. Lower extremity edema    4. Secondary esophageal varices without bleeding    5. Gastric varices without bleeding    6. Portal hypertension      #Compensated MASH Cirrhosis    Volume: Minimal ascites, not clinically significant. Minimal LE edema. Na 2g diet. Consider diuretics.  Infection: No concern at this time.   Bleeding: Last EGD with EV, GOV2, PHG (9/18/23). Continue Coreg 6.25mg BID, goal HR 55-60 at rest. Repeat EGD not warranted.  Encephalopathy: No prior history.  Screening: Last AFP 5.5 (9/19/23). Contrasted CT without focal lesions (11/20/23). Repeat AFP, US q6 months.  Immunization: HAV immune. HBV not immune. Recommend HBV vaccination.  Transplant: Low MELD. Advanced age.    Return to clinic in April 2024.    Kenna Medrano MD  Staff Physician  Hepatology and Liver Transplant  [facility]  [facility]

## 2023-12-25 PROBLEM — D50.0 ANEMIA DUE TO GASTROINTESTINAL BLOOD LOSS: Status: RESOLVED | Noted: 2023-09-23 | Resolved: 2023-12-25

## 2024-03-01 RX ORDER — OMEPRAZOLE 20 MG/1
CAPSULE, DELAYED RELEASE ORAL
Qty: 90 CAPSULE | Refills: 3 | Status: CANCELLED | OUTPATIENT
Start: 2024-03-01

## 2024-03-04 RX ORDER — OMEPRAZOLE 20 MG/1
CAPSULE, DELAYED RELEASE ORAL
Qty: 90 CAPSULE | Refills: 3 | Status: CANCELLED | OUTPATIENT
Start: 2024-03-01

## 2024-03-10 RX ORDER — OMEPRAZOLE 20 MG/1
CAPSULE, DELAYED RELEASE ORAL
Qty: 90 CAPSULE | Refills: 3 | OUTPATIENT
Start: 2024-03-10

## 2024-04-15 ENCOUNTER — HOSPITAL ENCOUNTER (OUTPATIENT)
Dept: RADIOLOGY | Facility: HOSPITAL | Age: 84
Discharge: HOME | End: 2024-04-15
Attending: INTERNAL MEDICINE
Payer: MEDICARE

## 2024-04-15 DIAGNOSIS — K74.69 COMPENSATED LIVER DISEASE: ICD-10-CM

## 2024-04-15 PROCEDURE — 76705 ECHO EXAM OF ABDOMEN: CPT | Mod: TC

## 2024-04-15 PROCEDURE — 76705 ECHO EXAM OF ABDOMEN: CPT | Mod: 26,,, | Performed by: RADIOLOGY

## 2024-04-23 ENCOUNTER — OFFICE VISIT (OUTPATIENT)
Dept: HEPATOLOGY | Facility: CLINIC | Age: 84
End: 2024-04-23
Payer: MEDICARE

## 2024-04-23 VITALS
BODY MASS INDEX: 27.16 KG/M2 | DIASTOLIC BLOOD PRESSURE: 60 MMHG | HEART RATE: 58 BPM | SYSTOLIC BLOOD PRESSURE: 123 MMHG | HEIGHT: 66 IN | OXYGEN SATURATION: 98 % | WEIGHT: 169 LBS

## 2024-04-23 DIAGNOSIS — K75.81 METABOLIC DYSFUNCTION-ASSOCIATED STEATOHEPATITIS (MASH): ICD-10-CM

## 2024-04-23 DIAGNOSIS — I85.10 SECONDARY ESOPHAGEAL VARICES WITHOUT BLEEDING: ICD-10-CM

## 2024-04-23 DIAGNOSIS — K76.6 PORTAL HYPERTENSION: ICD-10-CM

## 2024-04-23 DIAGNOSIS — K74.69 COMPENSATED LIVER DISEASE: ICD-10-CM

## 2024-04-23 DIAGNOSIS — I86.4 GASTRIC VARICES WITHOUT BLEEDING: ICD-10-CM

## 2024-04-23 DIAGNOSIS — E11.69 TYPE 2 DIABETES MELLITUS WITH OTHER SPECIFIED COMPLICATION, WITHOUT LONG-TERM CURRENT USE OF INSULIN: ICD-10-CM

## 2024-04-23 PROBLEM — E11.9 TYPE 2 DIABETES MELLITUS, WITHOUT LONG-TERM CURRENT USE OF INSULIN: Status: ACTIVE | Noted: 2024-04-23

## 2024-04-23 PROCEDURE — 99214 OFFICE O/P EST MOD 30 MIN: CPT | Mod: S$PBB,,, | Performed by: INTERNAL MEDICINE

## 2024-04-23 PROCEDURE — 99999 PR PBB SHADOW E&M-EST. PATIENT-LVL V: CPT | Mod: PBBFAC,,, | Performed by: INTERNAL MEDICINE

## 2024-04-23 PROCEDURE — 99215 OFFICE O/P EST HI 40 MIN: CPT | Mod: PBBFAC | Performed by: INTERNAL MEDICINE

## 2024-04-23 NOTE — PROGRESS NOTES
Hepatology Follow Up Note    Referring provider: No ref. provider found  PCP: Pam Soliman MD    Chief complaint: follow up MASH cirrhosis    Last seen in clinic on: 12/13/2023    Brief HPI:   Ning Rodriguez is a 84 y.o. 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     She is accompanied by her , Dr. Joshuae.     Interval Events:  - Feels ok.   - Notes catching a recent cold after taking a flight to [place].   - Feels fine now, except for lingering mildly productive cough.  - Adhering to a low fat, low sodium diet.   - Trying to avoid weight gain.   - Continues on Coreg.  - The patient denies abdominal distension, encephalopathy, jaundice, gross GI bleeding.  - This is the extent of the patient's complaints at this time.    Past Medical History:   Diagnosis Date    Allergy     Arthritis     Cataract     Diabetes mellitus, type 2     Hypertension     Thyroid disease        Past Surgical History:   Procedure Laterality Date    CATARACT EXTRACTION W/  INTRAOCULAR LENS IMPLANT Right 05/16/2022    Procedure: EXTRACTION, CATARACT, WITH IOL INSERTION;  Surgeon: Tami Zarate MD;  Location: [facility];  Service: Ophthalmology;  Laterality: Right;  ORA ONLY FOR CYL MEASUREMENT/TORIC DECISION    CATARACT EXTRACTION W/  INTRAOCULAR LENS IMPLANT Left 06/20/2022    Procedure: EXTRACTION, CATARACT, WITH IOL INSERTION;  Surgeon: Tami Zarate MD;  Location: [facility];  Service: Ophthalmology;  Laterality: Left;  ORA ONLY FOR CYL MEASUREMENT/TORIC DECISION    COLONOSCOPY      COLONOSCOPY N/A 9/18/2023    Procedure: COLONOSCOPY;  Surgeon: Dov Michele MD;  Location: [facility];  Service: Endoscopy;  Laterality: N/A;    ESOPHAGOGASTRODUODENOSCOPY N/A 9/18/2023    Procedure: EGD (ESOPHAGOGASTRODUODENOSCOPY);  Surgeon: Dov Michele MD;  Location: [facility];  Service: Endoscopy;  Laterality: N/A;     HEMORRHOID SURGERY         No family history on file.    Social History     Tobacco Use    Smoking status: Former    Smokeless tobacco: Never   Substance Use Topics    Alcohol use: No     Alcohol/week: 0.0 standard drinks of alcohol    Drug use: No       Current Outpatient Medications   Medication Sig Dispense Refill    amoxicillin-clavulanate 875-125mg (AUGMENTIN) 875-125 mg per tablet Take 1 tablet by mouth 2 (two) times daily. 30 tablet 1    blood sugar diagnostic (CONTOUR TEST STRIPS) Strp Check blood sugar twice weekly plus as directed by  each 6    carvediloL (COREG) 6.25 MG tablet Take 1 tablet (6.25 mg total) by mouth 2 (two) times daily with meals. 60 tablet 11    celecoxib (CELEBREX) 100 MG capsule take 1 Capsule by mouth  twice daily as needed 180 capsule 1    ergocalciferol (VITAMIN D2) 50,000 unit Cap Take 1 capsule (50,000 Units total) by mouth every 7 days. 4 capsule 2    flu vac  [id number],PF, (FLUAD QUAD -,65Y UP,,PF,) 60 mcg (15 mcg x 4)/0.5 mL Syrg inject by [facility] 0.5 mL 0    glipiZIDE (GLUCOTROL) 5 MG tablet Take 1 Tablet(s) Oral two times a day 180 tablet 1    hydrALAZINE (APRESOLINE) 25 MG tablet Take 1 tablet (25 mg total) by mouth 3 (three) times daily. 270 tablet 6    levothyroxine (SYNTHROID) 25 MCG tablet Take 1 tablet (25 mcg total) by mouth before breakfast. 30 tablet 5    omeprazole (PRILOSEC) 20 MG capsule Take 1 capsule by mouth daily 90 capsule 3    pneumoc 20-tahir conj-dip cr,PF, (PREVNAR-20, PF,) 0.5 mL Syrg injection Inject into the muscle. 0.5 mL 0    promethazine-dextromethorphan (PROMETHAZINE-DM) 6.25-15 mg/5 mL Syrp Take 5 mLs by mouth nightly as needed (cough). 180 mL 0    tobramycin sulfate 0.3% (TOBREX) 0.3 % ophthalmic solution SMARTSI-2 Drop(s) In Eye(s) Every 4 Hours      tretinoin (RETIN-A) 0.05 % cream APPLY TO THE AFFECTED AREA(S) ON FACE AT BEDTIME 20 g 2    triamcinolone (NASACORT) 55 mcg nasal inhaler 2 sprays by Nasal route once daily. 10.8 g 0  "   cevimeline (EVOXAC) 30 mg capsule Take 1 capsule (30 mg total) by mouth 3 (three) times daily. 90 capsule 11     Current Facility-Administered Medications   Medication Dose Route Frequency Provider Last Rate Last Admin    sodium chloride 0.9% flush 10 mL  10 mL Intravenous PRN Tami Zarate MD           Review of patient's allergies indicates:   Allergen Reactions    Augmentin [amoxicillin-pot clavulanate] Rash     Rash in legs            Vitals:    04/23/24 1024   BP: 123/60   Pulse: (!) 58   SpO2: 98%   Weight: 76.7 kg (169 lb)   Height: 5' 6" (1.676 m)   Body mass index is 27.28 kg/m².    Physical Exam  Constitutional:       General: She is not in acute distress.  Eyes:      General: No scleral icterus.  Cardiovascular:      Rate and Rhythm: Normal rate.   Pulmonary:      Effort: Pulmonary effort is normal.   Abdominal:      General: Abdomen is protuberant. There is no distension.      Palpations: Abdomen is soft. There is no fluid wave.      Tenderness: There is no abdominal tenderness.   Musculoskeletal:      Right lower leg: No edema.      Left lower leg: No edema.   Skin:     General: Skin is warm.      Coloration: Skin is not jaundiced.   Neurological:      General: No focal deficit present.      Mental Status: She is alert and oriented to person, place, and time.      Comments: No asterixis.   Psychiatric:         Behavior: Behavior is cooperative.         Thought Content: Thought content normal.         LABS: I personally reviewed pertinent laboratory findings.    Lab Results   Component Value Date    WBC 5.08 04/15/2024    HGB 13.9 04/15/2024    HCT 43.8 04/15/2024    MCV 97 04/15/2024     04/15/2024       Lab Results   Component Value Date     04/15/2024    K 5.0 04/15/2024     04/15/2024    CO2 24 04/15/2024    BUN 18 04/15/2024    CREATININE 0.8 04/15/2024    CALCIUM 9.3 04/15/2024    ANIONGAP 8 04/15/2024    ESTGFRAFRICA >60 01/31/2019    EGFRNONAA >60 01/31/2019       Lab " "Results   Component Value Date    ALT 32 04/15/2024    AST 33 04/15/2024    ALKPHOS 79 04/15/2024    BILITOT 0.8 04/15/2024       Lab Results   Component Value Date    HEPBCAB Non-reactive 09/19/2023    HEPCAB Non-reactive 08/23/2023       No results found for: "YAZMIN", "MITOAB", "SMOOTHMUSCAB", "IGG", "CERULOPLSM"     MELD 3.0: 9 at 4/15/2024  8:23 AM  MELD-Na: 8 at 4/15/2024  8:23 AM  Calculated from:  Serum Creatinine: 0.8 mg/dL (Using min of 1 mg/dL) at 4/15/2024  8:23 AM  Serum Sodium: 140 mmol/L (Using max of 137 mmol/L) at 4/15/2024  8:23 AM  Total Bilirubin: 0.8 mg/dL (Using min of 1 mg/dL) at 4/15/2024  8:23 AM  Serum Albumin: 3.6 g/dL (Using max of 3.5 g/dL) at 4/15/2024  8:23 AM  INR(ratio): 1.2 at 4/15/2024  8:23 AM  Age at listing (hypothetical): 84 years  Sex: Female at 4/15/2024  8:23 AM       IMAGING: I personally reviewed imaging studies.    US Abdomen Limited 4/15/2024  FINDINGS:  Liver: Normal in size, measuring 12.2 cm. Heterogeneous parenchymal echotexture.  No focal hepatic lesions. HRI: 1.0, suggesting less than 5% steatosis.     Gallbladder: 2 mobile gallstones, the largest measuring 8 mm.  No wall thickening or pericholecystic fluid.  No wall hyperemia.  Negative sonographic De Leon's sign.     Biliary system: The common duct is not dilated, measuring 4 mm.  No intrahepatic ductal dilatation.     Spleen: Enlarged with a homogeneous echotexture, measuring 13.1 x 5.7 cm.     Pancreas: The visualized portions of pancreas appear normal.     IVC: Normal     Miscellaneous: No ascites.     Impression:     Heterogeneous hepatic parenchymal echotexture.  No focal hepatic lesions.     Splenomegaly.     Cholelithiasis without evidence of acute cholecystitis.    CT A/P with contrast 11/20/23  Impression:     Cholelithiasis and gallbladder wall thickening/pericholecystic fluid which may be reactive to hepatic dysfunction.  If there is further concern for acute cholecystitis, consider HIDA scan.     Hepatic " cirrhosis and findings of portal hypertension including mild ascites, upper abdominal varices, and splenomegaly.     Mild diffuse small bowel wall thickening, compatible with portal enteropathy.    EGD 9/18/2023  Indications:           Iron deficiency anemia   Findings:       Esophageal varices were found in the distal esophagus.        Moderate portal hypertensive gastropathy was found in the entire        examined stomach.        A single angioectasia was found in the gastric fundus. Coagulation        for hemostasis using argon plasma was successful. Estimated blood        loss was minimal.        A single angioectasia with no bleeding was found in the gastric        antrum. Coagulation for tissue destruction using argon plasma was        successful.        Type 2 gastroesophageal varices (GOV2, esophageal varices which        extend along the fundus) with no bleeding were found in the stomach.        The examined duodenum was normal.     Assessment:  Ning Rodriguez is a 84 y.o. 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    1. Compensated liver disease    2. Metabolic dysfunction-associated steatohepatitis (MASH)    3. Portal hypertension    4. Gastric varices without bleeding    5. Secondary esophageal varices without bleeding    6. Type 2 diabetes mellitus with other specified complication, without long-term current use of insulin        #Compensated MASH Cirrhosis    Volume: No ascites. Minimal LE edema. Na 2g diet. Consider diuretics.  Infection: No concern at this time.   Bleeding: Last EGD with EV, GOV2, PHG (9/18/23). Continue Coreg 6.25mg BID, goal HR 55-60 at rest.   Encephalopathy: No prior history.  Screening: Last AFP 5.1 (4/15/24). US abdomen w/o focal lesions (4/15/24). Repeat AFP, US q6 months.  Immunization: HAV immune.  Recommend HBV vaccination.  Transplant: Low MELD. Advanced  age.    #MASH: Dr. Rodriguez inquired re: resmetirom for treatment of MASH. I informed him resmetirom is only indicated for F2-F3 fibrosis, but not cirrhosis.     Return to clinic in 6 months, sooner if needed.    Kenna Medrano MD  Staff Physician  Hepatology and Liver Transplant  [facility]  [facility]

## 2024-05-14 RX ORDER — ERGOCALCIFEROL 1.25 MG/1
50000 CAPSULE ORAL
Qty: 4 CAPSULE | Refills: 2 | Status: CANCELLED | OUTPATIENT
Start: 2024-05-14 | End: 2024-08-12

## 2024-05-23 ENCOUNTER — TELEPHONE (OUTPATIENT)
Dept: PRIMARY CARE CLINIC | Facility: CLINIC | Age: 84
End: 2024-05-23
Payer: MEDICARE

## 2024-05-23 NOTE — TELEPHONE ENCOUNTER
----- Message from Antoinette Durand sent at 5/23/2024  3:51 PM CDT -----  Contact: Serg/Gume/[phone]  Patient is returning a phone call.  Who left a message for the patient: Chiara   Does patient know what this is regarding:  yes  Would you like a call back, or a response through your [facility] portal?:   call back   Comments:

## 2024-05-23 NOTE — TELEPHONE ENCOUNTER
----- Message from Antoinette Durand sent at 5/23/2024  2:47 PM CDT -----  Contact: Spouse/Gume/[phone]  Reason for the appointment:  Establish care   Patient preference of timeframe to be scheduled:  8/1 with her 's 9:00 appt   Would the patient like a call back, or a response through their [facility] portal?:   call back   Comments:  pt's spouse(Gume) said that he would like for patient to get an appt along with his . Please advise

## 2024-05-25 ENCOUNTER — HOSPITAL ENCOUNTER (EMERGENCY)
Facility: HOSPITAL | Age: 84
Discharge: HOME | End: 2024-05-25
Attending: EMERGENCY MEDICINE
Payer: MEDICARE

## 2024-05-25 VITALS
SYSTOLIC BLOOD PRESSURE: 165 MMHG | HEART RATE: 61 BPM | RESPIRATION RATE: 17 BRPM | OXYGEN SATURATION: 97 % | BODY MASS INDEX: 26.84 KG/M2 | WEIGHT: 167 LBS | TEMPERATURE: 98 F | DIASTOLIC BLOOD PRESSURE: 73 MMHG | HEIGHT: 66 IN

## 2024-05-25 DIAGNOSIS — K85.90 ACUTE PANCREATITIS WITHOUT INFECTION OR NECROSIS, UNSPECIFIED PANCREATITIS TYPE: Primary | ICD-10-CM

## 2024-05-25 DIAGNOSIS — R10.13 EPIGASTRIC ABDOMINAL PAIN: ICD-10-CM

## 2024-05-25 DIAGNOSIS — K74.60 HEPATIC CIRRHOSIS, UNSPECIFIED HEPATIC CIRRHOSIS TYPE, UNSPECIFIED WHETHER ASCITES PRESENT: ICD-10-CM

## 2024-05-25 DIAGNOSIS — K80.20 CALCULUS OF GALLBLADDER WITHOUT CHOLECYSTITIS WITHOUT OBSTRUCTION: ICD-10-CM

## 2024-05-25 DIAGNOSIS — I85.00 ESOPHAGEAL VARICES WITHOUT BLEEDING, UNSPECIFIED ESOPHAGEAL VARICES TYPE: ICD-10-CM

## 2024-05-25 DIAGNOSIS — R10.9 ABDOMINAL PAIN: ICD-10-CM

## 2024-05-25 LAB
ALBUMIN SERPL BCP-MCNC: 3.4 G/DL (ref 3.5–5.2)
ALP SERPL-CCNC: 74 U/L (ref 55–135)
ALT SERPL W/O P-5'-P-CCNC: 20 U/L (ref 10–44)
ANION GAP SERPL CALC-SCNC: 9 MMOL/L (ref 8–16)
AST SERPL-CCNC: 22 U/L (ref 10–40)
BASOPHILS # BLD AUTO: 0.03 K/UL (ref 0–0.2)
BASOPHILS NFR BLD: 0.7 % (ref 0–1.9)
BILIRUB SERPL-MCNC: 1.3 MG/DL (ref 0.1–1)
BILIRUB UR QL STRIP: NEGATIVE
BUN SERPL-MCNC: 15 MG/DL (ref 8–23)
CALCIUM SERPL-MCNC: 9 MG/DL (ref 8.7–10.5)
CHLORIDE SERPL-SCNC: 108 MMOL/L (ref 95–110)
CLARITY UR: CLEAR
CO2 SERPL-SCNC: 20 MMOL/L (ref 23–29)
COLOR UR: YELLOW
CREAT SERPL-MCNC: 0.8 MG/DL (ref 0.5–1.4)
DIFFERENTIAL METHOD BLD: ABNORMAL
EOSINOPHIL # BLD AUTO: 0.1 K/UL (ref 0–0.5)
EOSINOPHIL NFR BLD: 3 % (ref 0–8)
ERYTHROCYTE [DISTWIDTH] IN BLOOD BY AUTOMATED COUNT: 14.6 % (ref 11.5–14.5)
EST. GFR  (NO RACE VARIABLE): >60 ML/MIN/1.73 M^2
GLUCOSE SERPL-MCNC: 123 MG/DL (ref 70–110)
GLUCOSE UR QL STRIP: NEGATIVE
HCT VFR BLD AUTO: 37.4 % (ref 37–48.5)
HGB BLD-MCNC: 12.6 G/DL (ref 12–16)
HGB UR QL STRIP: NEGATIVE
IMM GRANULOCYTES # BLD AUTO: 0.01 K/UL (ref 0–0.04)
IMM GRANULOCYTES NFR BLD AUTO: 0.2 % (ref 0–0.5)
KETONES UR QL STRIP: NEGATIVE
LEUKOCYTE ESTERASE UR QL STRIP: NEGATIVE
LIPASE SERPL-CCNC: 146 U/L (ref 4–60)
LYMPHOCYTES # BLD AUTO: 0.6 K/UL (ref 1–4.8)
LYMPHOCYTES NFR BLD: 14.9 % (ref 18–48)
MAGNESIUM SERPL-MCNC: 1.7 MG/DL (ref 1.6–2.6)
MCH RBC QN AUTO: 31.4 PG (ref 27–31)
MCHC RBC AUTO-ENTMCNC: 33.7 G/DL (ref 32–36)
MCV RBC AUTO: 93 FL (ref 82–98)
MONOCYTES # BLD AUTO: 0.5 K/UL (ref 0.3–1)
MONOCYTES NFR BLD: 11.4 % (ref 4–15)
NEUTROPHILS # BLD AUTO: 2.8 K/UL (ref 1.8–7.7)
NEUTROPHILS NFR BLD: 69.8 % (ref 38–73)
NITRITE UR QL STRIP: NEGATIVE
NRBC BLD-RTO: 0 /100 WBC
PH UR STRIP: 7 [PH] (ref 5–8)
PLATELET # BLD AUTO: 85 K/UL (ref 150–450)
PMV BLD AUTO: 11 FL (ref 9.2–12.9)
POTASSIUM SERPL-SCNC: 4.2 MMOL/L (ref 3.5–5.1)
PROT SERPL-MCNC: 6.8 G/DL (ref 6–8.4)
PROT UR QL STRIP: NEGATIVE
RBC # BLD AUTO: 4.01 M/UL (ref 4–5.4)
SODIUM SERPL-SCNC: 137 MMOL/L (ref 136–145)
SP GR UR STRIP: 1.02 (ref 1–1.03)
TROPONIN I SERPL DL<=0.01 NG/ML-MCNC: <0.006 NG/ML (ref 0–0.03)
URN SPEC COLLECT METH UR: NORMAL
UROBILINOGEN UR STRIP-ACNC: NEGATIVE EU/DL
WBC # BLD AUTO: 4.04 K/UL (ref 3.9–12.7)

## 2024-05-25 PROCEDURE — 81003 URINALYSIS AUTO W/O SCOPE: CPT | Performed by: EMERGENCY MEDICINE

## 2024-05-25 PROCEDURE — 93010 ELECTROCARDIOGRAM REPORT: CPT | Mod: ,,, | Performed by: INTERNAL MEDICINE

## 2024-05-25 PROCEDURE — 93005 ELECTROCARDIOGRAM TRACING: CPT

## 2024-05-25 PROCEDURE — 85025 COMPLETE CBC W/AUTO DIFF WBC: CPT | Performed by: EMERGENCY MEDICINE

## 2024-05-25 PROCEDURE — 83690 ASSAY OF LIPASE: CPT | Performed by: EMERGENCY MEDICINE

## 2024-05-25 PROCEDURE — 84484 ASSAY OF TROPONIN QUANT: CPT | Performed by: EMERGENCY MEDICINE

## 2024-05-25 PROCEDURE — 80053 COMPREHEN METABOLIC PANEL: CPT | Performed by: EMERGENCY MEDICINE

## 2024-05-25 PROCEDURE — 99285 EMERGENCY DEPT VISIT HI MDM: CPT | Mod: 25

## 2024-05-25 PROCEDURE — 25500020 PHARM REV CODE 255: Performed by: EMERGENCY MEDICINE

## 2024-05-25 PROCEDURE — 83735 ASSAY OF MAGNESIUM: CPT | Performed by: EMERGENCY MEDICINE

## 2024-05-25 RX ORDER — HYDROCODONE BITARTRATE AND ACETAMINOPHEN 5; 325 MG/1; MG/1
1 TABLET ORAL EVERY 4 HOURS PRN
Qty: 15 TABLET | Refills: 0 | Status: SHIPPED | OUTPATIENT
Start: 2024-05-25

## 2024-05-25 RX ORDER — ONDANSETRON 4 MG/1
4 TABLET, ORALLY DISINTEGRATING ORAL EVERY 6 HOURS PRN
Qty: 20 TABLET | Refills: 0 | Status: SHIPPED | OUTPATIENT
Start: 2024-05-25

## 2024-05-25 RX ADMIN — IOHEXOL 75 ML: 350 INJECTION, SOLUTION INTRAVENOUS at 11:05

## 2024-05-25 NOTE — ED NOTES
pt resting in bed comfortably with no complaints. VSS, NAD noted, RR even and unlabored. O2 sats remain above 97% on RA.   pt connected to monitors, bed locked and in lowest position, call light within reach. will continue to monitor, pending further orders at this time.

## 2024-05-25 NOTE — ED NOTES
pt presents to ED c/o mid gastric abdominal pain radiating to her back x2days. Pt reports no strenuous activity or heavy lifting. abdomen soft, non tender and non distended. pt currently has no other complaints, denies  n/v, fevers and chills.     AAOx3, GCS15, answers questions appropriately and in complete sentences.PERRLA. currently denies CP and SOB. RR even and unlabored, NAD noted. pt o2 sats 97% on RA.   full ROM and sensation present to all extremities bilaterally. +2 bilateral radial and pedal pulses present. skin warm and dry, capillary refill less than 3 seconds.      pt placed on all monitors, bed locked and in lowest position and call light within reach. ED workup in process, will continue to closely monitor.

## 2024-05-25 NOTE — ED PROVIDER NOTES
Encounter Date: 5/25/2024       History     Chief Complaint   Patient presents with    Abdominal Pain     Patient presents to ED from home with c/o epigastric pain and pain to middle of back that started yesterday morning. Patient denies chest pain or SOB. Denies N/V/D. Patient has a hx of non-alcoholic sorosis, acid reflex and gallstones. Patient takes prescription omeprazole daily, and took Pepcid for pain with some relief.      Patient is an 84-year-old female who complains of epigastric abdominal pain that began yesterday morning.  Pain is worsened since onset, with radiation to her back.  She denies nausea or vomiting.  No chest pain or shortness of breath.  No fever, chills or cough.  She has been diagnosed with gallstones in the past.      Review of patient's allergies indicates:   Allergen Reactions    Augmentin [amoxicillin-pot clavulanate] Rash     Rash in legs     Past Medical History:   Diagnosis Date    Allergy     Arthritis     Cataract     Diabetes mellitus, type 2     Hypertension     Thyroid disease      Past Surgical History:   Procedure Laterality Date    CATARACT EXTRACTION W/  INTRAOCULAR LENS IMPLANT Right 05/16/2022    Procedure: EXTRACTION, CATARACT, WITH IOL INSERTION;  Surgeon: Tami Zarate MD;  Location: [facility] OR;  Service: Ophthalmology;  Laterality: Right;  ORA ONLY FOR CYL MEASUREMENT/TORIC DECISION    CATARACT EXTRACTION W/  INTRAOCULAR LENS IMPLANT Left 06/20/2022    Procedure: EXTRACTION, CATARACT, WITH IOL INSERTION;  Surgeon: Tami Zarate MD;  Location: [facility] OR;  Service: Ophthalmology;  Laterality: Left;  ORA ONLY FOR CYL MEASUREMENT/TORIC DECISION    COLONOSCOPY      COLONOSCOPY N/A 9/18/2023    Procedure: COLONOSCOPY;  Surgeon: Dov Michele MD;  Location: [facility];  Service: Endoscopy;  Laterality: N/A;    ESOPHAGOGASTRODUODENOSCOPY N/A 9/18/2023    Procedure: EGD (ESOPHAGOGASTRODUODENOSCOPY);  Surgeon: Dov Michele MD;  Location: [facility];  Service: Endoscopy;   Laterality: N/A;    HEMORRHOID SURGERY       No family history on file.  Social History     Tobacco Use    Smoking status: Former    Smokeless tobacco: Never   Substance Use Topics    Alcohol use: No     Alcohol/week: 0.0 standard drinks of alcohol    Drug use: No     Review of Systems   Constitutional:  Negative for fever.   Respiratory:  Negative for shortness of breath.    Cardiovascular:  Negative for chest pain.   Gastrointestinal:  Positive for abdominal pain. Negative for diarrhea, nausea and vomiting.   Genitourinary:  Negative for dysuria.   All other systems reviewed and are negative.      Physical Exam     Initial Vitals [05/25/24 1000]   BP Pulse Resp Temp SpO2   (!) 116/91 71 18 98.2 °F (36.8 °C) 97 %      MAP       --         Physical Exam    Nursing note and vitals reviewed.  Constitutional: No distress.   Cardiovascular:  Normal rate, regular rhythm and normal heart sounds.           Pulmonary/Chest: Breath sounds normal. No respiratory distress.   Abdominal: Abdomen is soft. Bowel sounds are normal.   There is midepigastric tenderness with voluntary guarding.  No rebound.   Musculoskeletal:         General: No edema. Normal range of motion.     Neurological: She is alert and oriented to person, place, and time.   Skin: Skin is warm and dry.   Psychiatric: Thought content normal.         ED Course   Procedures  Labs Reviewed   CBC W/ AUTO DIFFERENTIAL - Abnormal; Notable for the following components:       Result Value    MCH 31.4 (*)     RDW 14.6 (*)     Platelets 85 (*)     Lymph # 0.6 (*)     Lymph % 14.9 (*)     All other components within normal limits   COMPREHENSIVE METABOLIC PANEL - Abnormal; Notable for the following components:    CO2 20 (*)     Glucose 123 (*)     Albumin 3.4 (*)     Total Bilirubin 1.3 (*)     All other components within normal limits   LIPASE - Abnormal; Notable for the following components:    Lipase 146 (*)     All other components within normal limits   URINALYSIS    TROPONIN I   MAGNESIUM     EKG Readings: (Independently Interpreted)   Initial Reading: No STEMI. Rhythm: Sinus Arrhythmia. Heart Rate: 60. ST Segments: Normal ST Segments. Axis: Left Axis Deviation.       Imaging Results              CT Abdomen Pelvis With IV Contrast NO Oral Contrast (Final result)  Result time 05/25/24 12:11:56      Final result by Brad Diamond MD (05/25/24 12:11:56)                   Impression:      Trace peripancreatic fat stranding which may represent mild pancreatitis.    Cirrhotic morphology of the liver and findings consistent with portal venous hypertension including splenomegaly, esophageal and gastric varices as well as small volume of ascites.    Cholelithiasis.    Bilateral renal cysts.    Additional findings as described above.      Electronically signed by: Brad Diamond MD  Date:    05/25/2024  Time:    12:11               Narrative:    EXAMINATION:  CT ABDOMEN PELVIS WITH IV CONTRAST    CLINICAL HISTORY:  Epigastric pain;    TECHNIQUE:  Axial images of the abdomen and pelvis were acquired  after the use of 75 cc [id number] IV contrast. No oral contrast.  Coronal and sagittal reconstructions were also obtained    COMPARISON:  CT abdomen pelvis from 11/20/2023    FINDINGS:  Heart: Normal in size. No pericardial effusion.    Lungs: Linear atelectasis of the lower lobes bilaterally.    Liver: Heterogeneous nodular contour of the liver suggestive of cirrhosis.  No focal hepatic lesion.    Gallbladder: A few calcified gallstones.    Bile Ducts: No evidence of dilated ducts.    Pancreas: No evidence of mass.  Trace peripancreatic fat stranding.  Normal enhancement.    Spleen: Enlarged in size measuring 14.8 cm.    Stomach and duodenum: Unremarkable.    Adrenals: Unremarkable.    Kidneys/ Ureters: Cortical thinning.  Normal enhancement.  Multiple bilateral hypodensities consistent with cysts or too small to characterize.  Findings are not significantly changed compared to prior  exam.  No hydronephrosis or nephrolithiasis. No ureteral dilatation.    Bladder: No evidence of wall thickening.    Reproductive organs: Unremarkable.    Bowel/Mesentery: Small bowel is normal in caliber with no evidence of obstruction. No evidence of inflammation or wall thickening.  Colon demonstrates no focal wall thickening.    Small volume of ascites adjacent to the liver and in the gallbladder fossa.  Ascites in the pelvis.    Lymph nodes: No lymphadenapathy.    Vasculature: No aneurysm. Moderate calcific atherosclerosis.  A few gastric varices.  Portal vein is patent.  A few esophageal varices.    Abdominal wall:  Unremarkable.    Bones: No acute fracture. No suspicious osseous lesions.                                       Medications   iohexoL (OMNIPAQUE 350) injection 75 mL (75 mLs Intravenous Given 5/25/24 2876)     Medical Decision Making  DDx :  Including but not limited to :  Gastritis, peptic ulcer disease, pancreatitis, small-bowel obstruction, gallstones, cholecystitis.    Emergency evaluation of an 84-year-old female with epigastric abdominal pain.  Lipase is mildly elevated at 146.  CT with signs suggestive of mild pancreatitis.  Also cirrhosis showing esophageal varices which the patient is aware of.  She sees a liver specialist as well as a gastroenterologist for this.  Liver enzymes are normal.  She has no vomiting, able to tolerate p.o. intake without difficulty.  Based on this I feel the patient may be safely treated at home and she has been supplied with instructions concerning diet.  She will be prescribed Zofran and a short course of Norco.  She should follow up with her physician as soon as able for recheck but most importantly return to the ED for any possible worsening.    Amount and/or Complexity of Data Reviewed  Labs: ordered.     Details: CBC is unremarkable.  CMP with a total bilirubin of 1.3.  Lipase is 146.  Radiology: ordered.     Details: Abdominal CT with peripancreatic fat  stranding, cirrhosis, gallstones and bilateral renal cysts.    Risk  Prescription drug management.                                      Clinical Impression:  Final diagnoses:  [R10.13] Epigastric abdominal pain  [R10.9] Abdominal pain  [K85.90] Acute pancreatitis without infection or necrosis, unspecified pancreatitis type (Primary)  [K80.20] Calculus of gallbladder without cholecystitis without obstruction  [K74.60] Hepatic cirrhosis, unspecified hepatic cirrhosis type, unspecified whether ascites present  [I85.00] Esophageal varices without bleeding, unspecified esophageal varices type          ED Disposition Condition    Discharge Stable          ED Prescriptions       Medication Sig Dispense Start Date End Date Auth. Provider    ondansetron (ZOFRAN-ODT) 4 MG TbDL Take 1 tablet (4 mg total) by mouth every 6 (six) hours as needed (Nausea or vomiting). 20 tablet 5/25/2024 -- Keyur Perez MD    HYDROcodone-acetaminophen (NORCO) 5-325 mg per tablet Take 1 tablet by mouth every 4 (four) hours as needed for Pain. 15 tablet 5/25/2024 -- Keyur Preez MD          Follow-up Information       Follow up With Specialties Details Why Contact Info    Pam Soliman MD Endocrinology  As needed [address] [facility] [address]  [facility] [id number] [phone]      [place] - Emergency Dept Emergency Medicine  If symptoms worsen [address] [facility] [id number] [phone]             Keyur Perez MD  05/26/24 4910

## 2024-05-27 ENCOUNTER — PATIENT MESSAGE (OUTPATIENT)
Dept: HEPATOLOGY | Facility: CLINIC | Age: 84
End: 2024-05-27
Payer: MEDICARE

## 2024-05-27 ENCOUNTER — TELEPHONE (OUTPATIENT)
Dept: HEPATOLOGY | Facility: CLINIC | Age: 84
End: 2024-05-27
Payer: MEDICARE

## 2024-05-27 DIAGNOSIS — K85.90 ACUTE PANCREATITIS, UNSPECIFIED COMPLICATION STATUS, UNSPECIFIED PANCREATITIS TYPE: Primary | ICD-10-CM

## 2024-05-27 LAB
OHS QRS DURATION: 92 MS
OHS QTC CALCULATION: 428 MS

## 2024-05-27 NOTE — TELEPHONE ENCOUNTER
LVM informing patient per Dr. Medrano she would like for patient to have blood work done 4/29/24. Appointment scheduled.

## 2024-05-29 ENCOUNTER — LAB VISIT (OUTPATIENT)
Dept: LAB | Facility: HOSPITAL | Age: 84
End: 2024-05-29
Attending: INTERNAL MEDICINE
Payer: MEDICARE

## 2024-05-29 DIAGNOSIS — R85.9 NONSPECIFIC ABNORMAL FINDING IN SALIVA: ICD-10-CM

## 2024-05-29 DIAGNOSIS — R10.9 STOMACH ACHE: ICD-10-CM

## 2024-05-29 DIAGNOSIS — K85.90 ACUTE PANCREATITIS, UNSPECIFIED COMPLICATION STATUS, UNSPECIFIED PANCREATITIS TYPE: ICD-10-CM

## 2024-05-29 LAB
ALBUMIN SERPL BCP-MCNC: 3.5 G/DL (ref 3.5–5.2)
ALBUMIN SERPL BCP-MCNC: 3.6 G/DL (ref 3.5–5.2)
ALP SERPL-CCNC: 65 U/L (ref 55–135)
ALP SERPL-CCNC: 67 U/L (ref 55–135)
ALT SERPL W/O P-5'-P-CCNC: 18 U/L (ref 10–44)
ALT SERPL W/O P-5'-P-CCNC: 19 U/L (ref 10–44)
AMYLASE SERPL-CCNC: 49 U/L (ref 20–110)
ANION GAP SERPL CALC-SCNC: 10 MMOL/L (ref 8–16)
ANION GAP SERPL CALC-SCNC: 12 MMOL/L (ref 8–16)
AST SERPL-CCNC: 22 U/L (ref 10–40)
AST SERPL-CCNC: 22 U/L (ref 10–40)
BASOPHILS # BLD AUTO: 0.03 K/UL (ref 0–0.2)
BASOPHILS NFR BLD: 0.9 % (ref 0–1.9)
BILIRUB DIRECT SERPL-MCNC: 0.5 MG/DL (ref 0.1–0.3)
BILIRUB SERPL-MCNC: 1.2 MG/DL (ref 0.1–1)
BILIRUB SERPL-MCNC: 1.2 MG/DL (ref 0.1–1)
BUN SERPL-MCNC: 11 MG/DL (ref 8–23)
BUN SERPL-MCNC: 11 MG/DL (ref 8–23)
CALCIUM SERPL-MCNC: 9.4 MG/DL (ref 8.7–10.5)
CALCIUM SERPL-MCNC: 9.5 MG/DL (ref 8.7–10.5)
CHLORIDE SERPL-SCNC: 103 MMOL/L (ref 95–110)
CHLORIDE SERPL-SCNC: 105 MMOL/L (ref 95–110)
CHOLEST SERPL-MCNC: 176 MG/DL (ref 120–199)
CHOLEST/HDLC SERPL: 3.4 {RATIO} (ref 2–5)
CO2 SERPL-SCNC: 21 MMOL/L (ref 23–29)
CO2 SERPL-SCNC: 21 MMOL/L (ref 23–29)
CREAT SERPL-MCNC: 0.8 MG/DL (ref 0.5–1.4)
CREAT SERPL-MCNC: 0.9 MG/DL (ref 0.5–1.4)
DIFFERENTIAL METHOD BLD: ABNORMAL
EOSINOPHIL # BLD AUTO: 0.1 K/UL (ref 0–0.5)
EOSINOPHIL NFR BLD: 3.6 % (ref 0–8)
ERYTHROCYTE [DISTWIDTH] IN BLOOD BY AUTOMATED COUNT: 14.1 % (ref 11.5–14.5)
EST. GFR  (NO RACE VARIABLE): >60 ML/MIN/1.73 M^2
EST. GFR  (NO RACE VARIABLE): >60 ML/MIN/1.73 M^2
GLUCOSE SERPL-MCNC: 109 MG/DL (ref 70–110)
GLUCOSE SERPL-MCNC: 111 MG/DL (ref 70–110)
HCT VFR BLD AUTO: 38.9 % (ref 37–48.5)
HDLC SERPL-MCNC: 52 MG/DL (ref 40–75)
HDLC SERPL: 29.5 % (ref 20–50)
HGB BLD-MCNC: 13 G/DL (ref 12–16)
IMM GRANULOCYTES # BLD AUTO: 0 K/UL (ref 0–0.04)
IMM GRANULOCYTES NFR BLD AUTO: 0 % (ref 0–0.5)
INR PPP: 1.2 (ref 0.8–1.2)
LDLC SERPL CALC-MCNC: 107.6 MG/DL (ref 63–159)
LIPASE SERPL-CCNC: 42 U/L (ref 4–60)
LYMPHOCYTES # BLD AUTO: 0.6 K/UL (ref 1–4.8)
LYMPHOCYTES NFR BLD: 16.7 % (ref 18–48)
MCH RBC QN AUTO: 31.7 PG (ref 27–31)
MCHC RBC AUTO-ENTMCNC: 33.4 G/DL (ref 32–36)
MCV RBC AUTO: 95 FL (ref 82–98)
MONOCYTES # BLD AUTO: 0.5 K/UL (ref 0.3–1)
MONOCYTES NFR BLD: 14.3 % (ref 4–15)
NEUTROPHILS # BLD AUTO: 2.2 K/UL (ref 1.8–7.7)
NEUTROPHILS NFR BLD: 64.5 % (ref 38–73)
NONHDLC SERPL-MCNC: 124 MG/DL
NRBC BLD-RTO: 0 /100 WBC
PLATELET # BLD AUTO: 116 K/UL (ref 150–450)
PMV BLD AUTO: 11.6 FL (ref 9.2–12.9)
POTASSIUM SERPL-SCNC: 4.8 MMOL/L (ref 3.5–5.1)
POTASSIUM SERPL-SCNC: 4.9 MMOL/L (ref 3.5–5.1)
PROT SERPL-MCNC: 7.3 G/DL (ref 6–8.4)
PROT SERPL-MCNC: 7.4 G/DL (ref 6–8.4)
PROTHROMBIN TIME: 12.9 SEC (ref 9–12.5)
RBC # BLD AUTO: 4.1 M/UL (ref 4–5.4)
SODIUM SERPL-SCNC: 136 MMOL/L (ref 136–145)
SODIUM SERPL-SCNC: 136 MMOL/L (ref 136–145)
TRIGL SERPL-MCNC: 82 MG/DL (ref 30–150)
WBC # BLD AUTO: 3.35 K/UL (ref 3.9–12.7)

## 2024-05-29 PROCEDURE — 80061 LIPID PANEL: CPT | Performed by: INTERNAL MEDICINE

## 2024-05-29 PROCEDURE — 80053 COMPREHEN METABOLIC PANEL: CPT | Performed by: INTERNAL MEDICINE

## 2024-05-29 PROCEDURE — 82150 ASSAY OF AMYLASE: CPT | Performed by: INTERNAL MEDICINE

## 2024-05-29 PROCEDURE — 85610 PROTHROMBIN TIME: CPT | Performed by: INTERNAL MEDICINE

## 2024-05-29 PROCEDURE — 83690 ASSAY OF LIPASE: CPT | Performed by: INTERNAL MEDICINE

## 2024-05-29 PROCEDURE — 36415 COLL VENOUS BLD VENIPUNCTURE: CPT | Performed by: INTERNAL MEDICINE

## 2024-05-29 PROCEDURE — 85025 COMPLETE CBC W/AUTO DIFF WBC: CPT | Performed by: INTERNAL MEDICINE

## 2024-05-29 PROCEDURE — 80048 BASIC METABOLIC PNL TOTAL CA: CPT | Mod: XB | Performed by: INTERNAL MEDICINE

## 2024-05-29 PROCEDURE — 80076 HEPATIC FUNCTION PANEL: CPT | Performed by: INTERNAL MEDICINE

## 2024-08-01 ENCOUNTER — PATIENT MESSAGE (OUTPATIENT)
Dept: PRIMARY CARE CLINIC | Facility: CLINIC | Age: 84
End: 2024-08-01

## 2024-08-01 ENCOUNTER — OFFICE VISIT (OUTPATIENT)
Dept: PRIMARY CARE CLINIC | Facility: CLINIC | Age: 84
End: 2024-08-01
Payer: MEDICARE

## 2024-08-01 VITALS
HEIGHT: 66 IN | DIASTOLIC BLOOD PRESSURE: 80 MMHG | SYSTOLIC BLOOD PRESSURE: 132 MMHG | BODY MASS INDEX: 28.33 KG/M2 | OXYGEN SATURATION: 97 % | HEART RATE: 71 BPM | WEIGHT: 176.25 LBS

## 2024-08-01 DIAGNOSIS — E11.22 TYPE 2 DIABETES MELLITUS WITH CHRONIC KIDNEY DISEASE, WITHOUT LONG-TERM CURRENT USE OF INSULIN, UNSPECIFIED CKD STAGE: ICD-10-CM

## 2024-08-01 DIAGNOSIS — Z76.89 ENCOUNTER TO ESTABLISH CARE: Primary | ICD-10-CM

## 2024-08-01 DIAGNOSIS — I10 ESSENTIAL HYPERTENSION: ICD-10-CM

## 2024-08-01 DIAGNOSIS — D69.6 THROMBOCYTOPENIA, UNSPECIFIED: ICD-10-CM

## 2024-08-01 DIAGNOSIS — D61.818 PANCYTOPENIA: ICD-10-CM

## 2024-08-01 DIAGNOSIS — E11.00 TYPE 2 DIABETES MELLITUS WITH HYPEROSMOLARITY WITHOUT COMA, WITHOUT LONG-TERM CURRENT USE OF INSULIN: ICD-10-CM

## 2024-08-01 DIAGNOSIS — E78.5 DYSLIPIDEMIA: ICD-10-CM

## 2024-08-01 DIAGNOSIS — Z78.0 POSTMENOPAUSE: ICD-10-CM

## 2024-08-01 DIAGNOSIS — E03.9 HYPOTHYROIDISM, UNSPECIFIED TYPE: ICD-10-CM

## 2024-08-01 DIAGNOSIS — D70.8 OTHER NEUTROPENIA: ICD-10-CM

## 2024-08-01 DIAGNOSIS — Z00.00 HEALTHCARE MAINTENANCE: ICD-10-CM

## 2024-08-01 DIAGNOSIS — E04.1 THYROID NODULE: ICD-10-CM

## 2024-08-01 DIAGNOSIS — K74.69 COMPENSATED LIVER DISEASE: ICD-10-CM

## 2024-08-01 PROCEDURE — 99999 PR PBB SHADOW E&M-EST. PATIENT-LVL V: CPT | Mod: PBBFAC,,, | Performed by: INTERNAL MEDICINE

## 2024-08-01 PROCEDURE — 99215 OFFICE O/P EST HI 40 MIN: CPT | Mod: PBBFAC,PN | Performed by: INTERNAL MEDICINE

## 2024-08-01 NOTE — PROGRESS NOTES
Subjective:       Patient ID: Ning Rodriguez is a 84 y.o. female.    Chief Complaint: Establish Care      HPI  Ning Rodriguez is a 84 y.o. female with  diabetes mellitus type 2,  hypothyroidism, hypertension, cirrhosis /compensated liver disease, MASH,  portal hypertension, thrombocytopenia /pancytopenia who presents today for Establish Care    Reports she has been followed by her endocrinologist outside [facility] and wants to get established with a primary care physician at [facility].    Has history of diabetes mellitus type 2 since 2005.  Currently treated with glipizide twice a day previously tried Rybelsus close to a year but had to stop it due to GI discomforts with nausea. Does not recall her A1c but has been controlled. With the   GLP1a medication lost weight but also due to a recent hospital stay.   She watches her diet,  stays active, but she is not exercising.  Appetite has been very good.    Has been having hypothyroidism for a long time and on the same medication or no need to change her prescription.  She reports cold intolerance and weight gain. No palpitations or leg swelling.    Hypertension has been well controlled on carvedilol and hydralazine.  Denies headaches, chest pains, palpitations, or  dizziness.  Lipids have been okay,  not on statins, unclear if due to steatosis/ Cirrhosis.    She was noted with anemia and low platelets.  On workup with colonoscopy and endoscopy was found with varices.  Her liver enzymes have been good and  lipids not that high.  Noted with gallstones and cholecystitis and had cholecystectomy.. She is being followed by hepatology and Gastroenterology.  Hematology also following pancytopenia.  Denies evidence of bleeding.  Occasionally may have a nosebleed with her history of allergies.    On December 20th she tripped on the sidewalk and fell hitting her face.  She had evaluation without complications.  Three-month after she was traveling to [place] and noted with  right arm and leg weakness.  She was brought to the ER and found with a subdural hematoma.  She had surgery and later needed rehab.  She has since recovered.  Lost weight during that hospital stay.    Has history of smoking but quit over 40 years ago.  Does not drink.  She is  a retired nephrologist.    Advance Care Planning     Date: 08/01/2024    Living Will  During this visit, I engaged the patient  in the voluntary advance care planning process.  The patient and I reviewed the role for advance directives and their purpose in directing future healthcare if the patient's unable to speak for him/herself.  At this point in time, the patient does have full decision-making capacity.  We discussed different extreme health states that she could experience, and reviewed what kind of medical care she would want in those situations.  The patient communicated that if she were comatose and had little chance of a meaningful recovery, she would want machines/life-sustaining treatments used. The patient has completed a living will to reflect these preferences and The patient has already designated a healthcare power of  to make decisions on her behalf.  I spent a total of <5 minutes engaging the patient in this advance care planning discussion.          Power of   I initiated the process of voluntary advance care planning today and explained the importance of this process to the patient.  I introduced the concept of advance directives to the patient, as well. Then the patient received detailed information about the importance of designating a Health Care Power of  (HCPOA). She was also instructed to communicate with this person about their wishes for future healthcare, should she become sick and lose decision-making capacity. The patient has previously appointed a HCPOA. After our discussion, the patient has decided to complete a HCPOA and has appointed her daughter and significant other, health  care agent:  OttoTSERING Rodriguez Renae Andrew, Teri [facility]  & health care agent number:  [id number], [phone], [phone] . I encouraged her to communicate with this person about their wishes for future healthcare, should she become sick and lose decision-making capacity.      A total of <5 min was spent on advance care planning. This discussion occurred on a fully voluntary basis with the verbal consent of the patient and/or family.      Health Maintenance:  Health Maintenance   Topic Date Due    Hemoglobin A1c  10/25/2022    Eye Exam  03/09/2023    Shingles Vaccine (2 of 2) 05/20/2024    DEXA Scan  11/22/2024    Lipid Panel  05/29/2025    TETANUS VACCINE  06/28/2032       Review of Systems   HENT:  Positive for nasal congestion.    Eyes:  Negative for visual disturbance.   Respiratory: Negative.     Cardiovascular: Negative.    Gastrointestinal: Negative.    Genitourinary: Negative.    Musculoskeletal: Negative.    Neurological:  Negative for memory loss.   Psychiatric/Behavioral:  Positive for sleep disturbance. The patient is nervous/anxious (will be moving).       Past Medical History:   Diagnosis Date    Allergy     Arthritis     Cataract     Diabetes mellitus, type 2     Hypertension     Portal hypertension 04/23/2024    Thyroid disease        Past Surgical History:   Procedure Laterality Date    CATARACT EXTRACTION W/  INTRAOCULAR LENS IMPLANT Right 05/16/2022    Procedure: EXTRACTION, CATARACT, WITH IOL INSERTION;  Surgeon: Tami Zarate MD;  Location: [facility], [place], operated by [facility] OR;  Service: Ophthalmology;  Laterality: Right;  ORA ONLY FOR CYL MEASUREMENT/TORIC DECISION    CATARACT EXTRACTION W/  INTRAOCULAR LENS IMPLANT Left 06/20/2022    Procedure: EXTRACTION, CATARACT, WITH IOL INSERTION;  Surgeon: Tami Zarate MD;  Location: [facility], [place], operated by [facility] OR;  Service: Ophthalmology;  Laterality: Left;  ORA ONLY FOR CYL MEASUREMENT/TORIC DECISION    COLONOSCOPY      COLONOSCOPY N/A 9/18/2023    Procedure: COLONOSCOPY;  Surgeon: Dov Michele  MD SUZANNE;  Location: [facility] ENDO;  Service: Endoscopy;  Laterality: N/A;    ESOPHAGOGASTRODUODENOSCOPY N/A 9/18/2023    Procedure: EGD (ESOPHAGOGASTRODUODENOSCOPY);  Surgeon: Dov Michele MD;  Location: [facility] ENDO;  Service: Endoscopy;  Laterality: N/A;    HEMORRHOID SURGERY         Family History   Problem Relation Name Age of Onset    Hypertension Mother      Diabetes Mother      COPD Mother      Heart disease Father  49        AMI    Cancer Brother 3         Prostate, cancer    Cirrhosis Brother 3     Mental illness Brother 3     Hypertension Maternal Grandmother      Hypertension Maternal Grandfather      No Known Problems Paternal Grandmother      No Known Problems Paternal Grandfather         Social History     Socioeconomic History    Marital status:    Tobacco Use    Smoking status: Former    Smokeless tobacco: Never   Substance and Sexual Activity    Alcohol use: No     Alcohol/week: 0.0 standard drinks of alcohol    Drug use: No     Social Determinants of Health     Financial Resource Strain: Low Risk  (11/21/2023)    Overall Financial Resource Strain (CARDIA)     Difficulty of Paying Living Expenses: Not hard at all   Food Insecurity: No Food Insecurity (11/21/2023)    Hunger Vital Sign     Worried About Running Out of Food in the Last Year: Never true     Ran Out of Food in the Last Year: Never true   Transportation Needs: No Transportation Needs (11/21/2023)    PRAPARE - Transportation     Lack of Transportation (Medical): No     Lack of Transportation (Non-Medical): No   Housing Stability: Unknown (11/21/2023)    Housing Stability Vital Sign     Unable to Pay for Housing in the Last Year: No     Unstable Housing in the Last Year: No       Current Outpatient Medications   Medication Sig Dispense Refill    blood sugar diagnostic (CONTOUR TEST STRIPS) Strp Check blood sugar twice weekly plus as directed by  each 6    carvediloL (COREG) 6.25 MG tablet Take 1 tablet (6.25 mg total) by mouth 2  (two) times daily with meals. 60 tablet 11    celecoxib (CELEBREX) 100 MG capsule take 1 Capsule by mouth  twice daily as needed 180 capsule 1    ergocalciferol (ERGOCALCIFEROL) 50,000 unit Cap take 1 capsule by mouth every 7 days 4 capsule 12    ferrous sulfate 324 mg (65 mg iron) TbEC Take 1 tablet (324 mg total) by mouth once daily. (Patient taking differently: Take 325 mg by mouth once a week.) 100 tablet 3    glipiZIDE (GLUCOTROL) 5 MG tablet Take 1 Tablet(s) Oral two times a day 180 tablet 1    glipiZIDE (GLUCOTROL) 5 MG tablet Take 1 Tablet by mouth two times a day 180 tablet 1    hydrALAZINE (APRESOLINE) 25 MG tablet Take 1 tablet (25 mg total) by mouth 3 (three) times daily. 270 tablet 6    levothyroxine (SYNTHROID) 25 MCG tablet Take 1 tablet (25 mcg total) by mouth before breakfast. 30 tablet 5    pantoprazole (PROTONIX) 40 MG tablet Take 1 Tablet(s) Oral every day 90 tablet 1    amoxicillin-clavulanate 875-125mg (AUGMENTIN) 875-125 mg per tablet Take 1 tablet by mouth 2 (two) times daily. 30 tablet 1    cevimeline (EVOXAC) 30 mg capsule Take 1 capsule (30 mg total) by mouth 3 (three) times daily. 90 capsule 11    flu vac 2021 65up-[id number],PF, (FLUAD QUAD 2021-22,65Y UP,,PF,) 60 mcg (15 mcg x 4)/0.5 mL Syrg inject by [facility] (Patient not taking: Reported on 8/1/2024) 0.5 mL 0    HYDROcodone-acetaminophen (NORCO) 5-325 mg per tablet Take 1 tablet by mouth every 4 (four) hours as needed for Pain. (Patient not taking: Reported on 8/1/2024) 15 tablet 0    omeprazole (PRILOSEC) 20 MG capsule Take 1 capsule by mouth daily (Patient not taking: Reported on 8/1/2024) 90 capsule 3    ondansetron (ZOFRAN-ODT) 4 MG TbDL Take 1 tablet (4 mg total) by mouth every 6 (six) hours as needed (Nausea or vomiting). 20 tablet 0    pneumoc 20-tahir conj-dip cr,PF, (PREVNAR-20, PF,) 0.5 mL Syrg injection Inject into the muscle. (Patient not taking: Reported on 8/1/2024) 0.5 mL 0    promethazine-dextromethorphan (PROMETHAZINE-DM)  "6.25-15 mg/5 mL Syrp Take 5 mLs by mouth nightly as needed (cough). (Patient not taking: Reported on 2024) 180 mL 0    tobramycin sulfate 0.3% (TOBREX) 0.3 % ophthalmic solution SMARTSI-2 Drop(s) In Eye(s) Every 4 Hours (Patient not taking: Reported on 2024)      tretinoin (RETIN-A) 0.05 % cream APPLY TO THE AFFECTED AREA(S) ON FACE AT BEDTIME (Patient not taking: Reported on 2024) 20 g 2    triamcinolone (NASACORT) 55 mcg nasal inhaler 2 sprays by Nasal route once daily. (Patient not taking: Reported on 2024) 10.8 g 0     Current Facility-Administered Medications   Medication Dose Route Frequency Provider Last Rate Last Admin    sodium chloride 0.9% flush 10 mL  10 mL Intravenous PRN Tami Zarate MD           Review of patient's allergies indicates:   Allergen Reactions    Augmentin [amoxicillin-pot clavulanate] Rash     Rash in legs         Objective:       Last 3 sets of Vitals        2024    10:24 AM 2024    10:00 AM 2024     2:09 PM   Vitals - 1 value per visit   SYSTOLIC 123 116 132   DIASTOLIC 60 91 80   Pulse 58 71 71   Temp  98.2 °F (36.8 °C)    Resp  18    SPO2 98 % 97 % 97 %   Weight (lb) 169 167 176.26   Weight (kg) 76.658 75.751 79.95   Height 5' 6" (1.676 m) 5' 6" (1.676 m) 5' 6" (1.676 m)   BMI (Calculated) 27.3 27 28.5   Pain Score Zero     Physical Exam  Constitutional:       General: She is not in acute distress.  HENT:      Head: Normocephalic.      Right Ear: Tympanic membrane, ear canal and external ear normal.      Left Ear: Tympanic membrane, ear canal and external ear normal.      Nose: Nose normal.      Mouth/Throat:      Mouth: Mucous membranes are moist.   Eyes:      General: No scleral icterus.     Extraocular Movements: Extraocular movements intact.      Conjunctiva/sclera: Conjunctivae normal.   Neck:      Vascular: No carotid bruit.      Comments: No goiter. Possible thyroid nodule on right lower side.  Cardiovascular:      Rate and Rhythm: Normal " rate and regular rhythm.      Pulses: Normal pulses.      Heart sounds: Normal heart sounds.   Pulmonary:      Effort: Pulmonary effort is normal.      Breath sounds: Normal breath sounds.   Abdominal:      General: Bowel sounds are normal. There is no distension.      Palpations: Abdomen is soft. There is no mass.      Tenderness: There is no abdominal tenderness.   Musculoskeletal:         General: No swelling.   Lymphadenopathy:      Cervical: No cervical adenopathy.   Skin:     General: Skin is warm and dry.   Neurological:      General: No focal deficit present.      Mental Status: She is alert and oriented to person, place, and time.   Psychiatric:         Mood and Affect: Mood normal.         Behavior: Behavior normal.       Protective Sensation (w/ 10 gram monofilament):  Right: Intact  Left: Intact    Visual Inspection:  Onychomycosis -  Bilateral    Pedal Pulses:   Right: Present  Left: Present    Posterior Tibialis Pulses:   Right:Present  Left: Present     CBC:  Recent Labs   Lab 04/15/24  0823 05/25/24  1039 05/29/24  0842   WBC 5.08 4.04 3.35 L   RBC 4.53 4.01 4.10   Hemoglobin 13.9 12.6 13.0   Hematocrit 43.8 37.4 38.9   Platelets 157 85 L 116 L   MCV 97 93 95   MCH 30.7 31.4 H 31.7 H   MCHC 31.7 L 33.7 33.4     CMP:  Recent Labs   Lab 04/15/24  0823 05/25/24  1039 05/29/24  0842   Glucose 102 123 H 109  111 H   Calcium 9.3 9.0 9.5  9.4   Albumin 3.6 3.4 L 3.5  3.6   Total Protein 7.3 6.8 7.3  7.4   Sodium 140 137 136  136   Potassium 5.0 4.2 4.9  4.8   CO2 24 20 L 21 L  21 L   Chloride 108 108 105  103   BUN 18 15 11  11   Creatinine 0.8 0.8 0.9  0.8   Alkaline Phosphatase 79 74 65  67   ALT 32 20 19  18   AST 33 22 22  22   Total Bilirubin 0.8 1.3 H 1.2 H  1.2 H     URINALYSIS:  Recent Labs   Lab 02/16/22  1042 11/20/23 2021 05/25/24  1154   Color, UA Yellow  Yellow   < > Yellow   Specific Gravity, UA 1.020  1.020   < > 1.025   pH, UA 5.0  5.0   < > 7.0   Protein, UA Negative   Negative   < > Negative   Bacteria Many A  --   --    Nitrite, UA Negative  Negative   < > Negative   Leukocytes, UA 3+ A  3+ A   < > Negative   Urobilinogen, UA  --    < > Negative    < > = values in this interval not displayed.      LIPIDS:  Recent Labs   Lab 05/29/24  0842   HDL 52   Cholesterol 176   Triglycerides 82   LDL Cholesterol 107.6   HDL/Cholesterol Ratio 29.5   Non-HDL Cholesterol 124   Total Cholesterol/HDL Ratio 3.4     TSH:        A1C:        Imaging:  CT Abdomen Pelvis With IV Contrast NO Oral Contrast  Narrative: EXAMINATION:  CT ABDOMEN PELVIS WITH IV CONTRAST    CLINICAL HISTORY:  Epigastric pain;    TECHNIQUE:  Axial images of the abdomen and pelvis were acquired  after the use of 75 cc Kurd709 IV contrast. No oral contrast.  Coronal and sagittal reconstructions were also obtained    COMPARISON:  CT abdomen pelvis from 11/20/2023    FINDINGS:  Heart: Normal in size. No pericardial effusion.    Lungs: Linear atelectasis of the lower lobes bilaterally.    Liver: Heterogeneous nodular contour of the liver suggestive of cirrhosis.  No focal hepatic lesion.    Gallbladder: A few calcified gallstones.    Bile Ducts: No evidence of dilated ducts.    Pancreas: No evidence of mass.  Trace peripancreatic fat stranding.  Normal enhancement.    Spleen: Enlarged in size measuring 14.8 cm.    Stomach and duodenum: Unremarkable.    Adrenals: Unremarkable.    Kidneys/ Ureters: Cortical thinning.  Normal enhancement.  Multiple bilateral hypodensities consistent with cysts or too small to characterize.  Findings are not significantly changed compared to prior exam.  No hydronephrosis or nephrolithiasis. No ureteral dilatation.    Bladder: No evidence of wall thickening.    Reproductive organs: Unremarkable.    Bowel/Mesentery: Small bowel is normal in caliber with no evidence of obstruction. No evidence of inflammation or wall thickening.  Colon demonstrates no focal wall thickening.    Small volume of ascites  adjacent to the liver and in the gallbladder fossa.  Ascites in the pelvis.    Lymph nodes: No lymphadenapathy.    Vasculature: No aneurysm. Moderate calcific atherosclerosis.  A few gastric varices.  Portal vein is patent.  A few esophageal varices.    Abdominal wall:  Unremarkable.    Bones: No acute fracture. No suspicious osseous lesions.  Impression: Trace peripancreatic fat stranding which may represent mild pancreatitis.    Cirrhotic morphology of the liver and findings consistent with portal venous hypertension including splenomegaly, esophageal and gastric varices as well as small volume of ascites.    Cholelithiasis.    Bilateral renal cysts.    Additional findings as described above.    Electronically signed by: Brad Diamond MD  Date:    05/25/2024  Time:    12:11      Assessment:       1. Encounter to establish care    2. Type 2 diabetes mellitus with chronic kidney disease, without long-term current use of insulin, unspecified CKD stage    3. Type 2 diabetes mellitus with hyperosmolarity without coma, without long-term current use of insulin    4. Essential hypertension    5. Hypothyroidism, unspecified type    6. Thyroid nodule    7. Dyslipidemia    8. Compensated liver disease    9. Pancytopenia    10. Other neutropenia    11. Thrombocytopenia, unspecified    12. Postmenopause    13. Healthcare maintenance            Plan:       1. Encounter to establish care    2. Type 2 diabetes mellitus with chronic kidney disease, without long-term current use of insulin, unspecified CKD stage  Overview:  Followed by endocrinologist outside [facility].  A1c apparently stable.  Has been on glipizide 5 mg twice a day without problems.  Previously tried Rybelsus 3 mg Daily and was able to  lose weight but was not tolerating side effects.    Assessment & Plan:  Apparently has been stable.  Glucose levels and previous metabolic panel have been acceptable.  Concerned she is  gaining weight.  Could consider different  GLP 1a or trial of Farxiga.      3. Type 2 diabetes mellitus with hyperosmolarity without coma, without long-term current use of insulin  Overview:  Followed by endocrinologist outside [facility].  A1c apparently stable.  Has been on glipizide 5 mg twice a day without problems.  Previously tried Rybelsus 3 mg Daily and was able to  lose weight but was not tolerating side effects.    Assessment & Plan:  Apparently has been stable.  Glucose levels and previous metabolic panel have been acceptable.  Concerned she is  gaining weight.  Could consider different GLP 1a or trial of Farxiga.    Orders:  -     Comprehensive Metabolic Panel; Future; Expected date: 08/01/2024  -     Hemoglobin A1C; Future; Expected date: 08/01/2024  -     Lipid Panel; Future; Expected date: 08/01/2024  -     Microalbumin/Creatinine Ratio, Urine; Future; Expected date: 08/01/2024  -     Ambulatory referral/consult to Podiatry; Future; Expected date: 08/01/2024  -     Ambulatory referral/consult to Ophthalmology; Future; Expected date: 08/01/2024    4. Essential hypertension  Overview:  On carvedilol 6.25 mg twice a day and hydralazine 25 mg 3 times a day.   ACE inhibitor caused cough.    Assessment & Plan:  Stable blood pressure.  Noted diastolic in the 80s but has been in the 60s and 70s in the last few visits.    Continue to monitor with same treatment.      5. Hypothyroidism, unspecified type  Overview:  Follows with endocrinologist outside [facility].    On levothyroxine 25 mcg daily.    Assessment & Plan:  Reports weight gain and cold intolerance.   On low-dose levothyroxine.  On exam possible thyroid nodule, check ultrasound.  Follow-up labs.      Orders:  -     TSH; Future; Expected date: 08/01/2024  -     T4, Free; Future; Expected date: 08/01/2024  -     US Thyroid; Future; Expected date: 08/01/2024    6. Thyroid nodule  -     US Thyroid; Future; Expected date: 08/01/2024    7. Dyslipidemia  Overview:  With history of diabetes mellitus type 2  her  goal LDL is 70mg/dl.    Assessment & Plan:  Last LDL was 103.  Follow-up labs.  If  considering aggressive prevention and will need to check with hepatology if statins are possible.      8. Compensated liver disease  Overview:  Noted with anemia and pancytopenia, on EGD noted with varices.  Found with compensated liver disease.  Hepatology following.  On beta-blocker,  using PPIs as needed.  Would try to avoid Celebrex with varices and liver disease.    Orders:  -     CBC Auto Differential; Future; Expected date: 08/01/2024    9. Pancytopenia  Overview:  Per hematology:  - her thrombocytopenia is likely secondary to cirrhosis and portal hypertension/splenomegaly.  - iron deficiency likely related to angiectasias  - continue oral iron    Assessment & Plan:   Labs have been stable.   Continue iron supplements.  Follow-up labs.       10. Other neutropenia    11. Thrombocytopenia, unspecified  Overview:  Likely due to chronic liver disease , portal hypertension, splenomegaly.  Followed by Hematology.    Assessment & Plan:  Labs have been stable.  No evidence of bleeding.  Continue to monitor.      12. Postmenopause  -     DXA Bone Density Axial Skeleton 1 or more sites; Future; Expected date: 08/01/2024    13. Healthcare maintenance  Assessment & Plan:  - Yearly labs-  5/29/2024 but pending A1c and urine microalbumin.   - Colon cancer screening-  09/18/2023  - ACP-  completed 8/1/24  - Eye exam-  2023, referral placed.  - Foot exam-  8/1/2024  - Vaccination-  due for RSV, COVID-19, shingles shot.         Health Maintenance Due   Topic Date Due    Diabetes Urine Screening  Never done    RSV Vaccine (Age 60+ and Pregnant patients) (1 - 1-dose 60+ series) Never done    Hemoglobin A1c  10/25/2022    Eye Exam  03/09/2023    COVID-19 Vaccine (5 - 2023-24 season) 09/01/2023    Shingles Vaccine (2 of 2) 05/20/2024        I spent a total of 50 minutes on the day of the visit.This includes face to face time and non-face to  face time preparing to see the patient (eg, review of tests), obtaining and/or reviewing separately obtained history, documenting clinical information in the electronic or other health record, independently interpreting results and communicating results to the patient/family/caregiver, or care coordinator.       Return to clinic in 1-3 months.  WS 2-3    May Meade MD  [facility]  Disclaimer:  This note has been generated using voice-recognition software. There may be grammatical or spelling errors that have been missed during proof-reading

## 2024-08-02 ENCOUNTER — PATIENT MESSAGE (OUTPATIENT)
Dept: PRIMARY CARE CLINIC | Facility: CLINIC | Age: 84
End: 2024-08-02
Payer: MEDICARE

## 2024-08-02 NOTE — ASSESSMENT & PLAN NOTE
- Yearly labs-  5/29/2024 but pending A1c and urine microalbumin.   - Colon cancer screening-  09/18/2023  - ACP-  completed 8/1/24  - Eye exam-  2023, referral placed.  - Foot exam-  8/1/2024  - Vaccination-  due for RSV, COVID-19, shingles shot.

## 2024-08-02 NOTE — ASSESSMENT & PLAN NOTE
Last LDL was 103.  Follow-up labs.  If  considering aggressive prevention and will need to check with hepatology if statins are possible.

## 2024-08-02 NOTE — ASSESSMENT & PLAN NOTE
Stable blood pressure.  Noted diastolic in the 80s but has been in the 60s and 70s in the last few visits.    Continue to monitor with same treatment.

## 2024-08-02 NOTE — ASSESSMENT & PLAN NOTE
Apparently has been stable.  Glucose levels and previous metabolic panel have been acceptable.  Concerned she is  gaining weight.  Could consider different GLP 1a or trial of Farxiga.

## 2024-08-02 NOTE — ASSESSMENT & PLAN NOTE
Reports weight gain and cold intolerance.   On low-dose levothyroxine.  On exam possible thyroid nodule, check ultrasound.  Follow-up labs.

## 2024-08-07 ENCOUNTER — HOSPITAL ENCOUNTER (OUTPATIENT)
Dept: RADIOLOGY | Facility: HOSPITAL | Age: 84
Discharge: HOME | End: 2024-08-07
Attending: INTERNAL MEDICINE
Payer: MEDICARE

## 2024-08-07 DIAGNOSIS — Z78.0 POSTMENOPAUSE: ICD-10-CM

## 2024-08-07 PROCEDURE — 77080 DXA BONE DENSITY AXIAL: CPT | Mod: TC

## 2024-08-08 ENCOUNTER — PATIENT MESSAGE (OUTPATIENT)
Dept: PRIMARY CARE CLINIC | Facility: CLINIC | Age: 84
End: 2024-08-08
Payer: MEDICARE

## 2024-08-08 ENCOUNTER — HOSPITAL ENCOUNTER (OUTPATIENT)
Dept: RADIOLOGY | Facility: HOSPITAL | Age: 84
Discharge: HOME | End: 2024-08-08
Attending: INTERNAL MEDICINE
Payer: MEDICARE

## 2024-08-08 DIAGNOSIS — E03.9 HYPOTHYROIDISM, UNSPECIFIED TYPE: ICD-10-CM

## 2024-08-08 DIAGNOSIS — E04.1 THYROID NODULE: ICD-10-CM

## 2024-08-08 PROCEDURE — 76536 US EXAM OF HEAD AND NECK: CPT | Mod: 26,,, | Performed by: RADIOLOGY

## 2024-08-08 PROCEDURE — 76536 US EXAM OF HEAD AND NECK: CPT | Mod: TC

## 2024-08-13 ENCOUNTER — PATIENT MESSAGE (OUTPATIENT)
Dept: PRIMARY CARE CLINIC | Facility: CLINIC | Age: 84
End: 2024-08-13
Payer: MEDICARE

## 2024-08-15 ENCOUNTER — TELEPHONE (OUTPATIENT)
Dept: PRIMARY CARE CLINIC | Facility: CLINIC | Age: 84
End: 2024-08-15
Payer: MEDICARE

## 2024-09-12 DIAGNOSIS — I85.00 ESOPHAGEAL VARICES WITHOUT BLEEDING, UNSPECIFIED ESOPHAGEAL VARICES TYPE: ICD-10-CM

## 2024-09-12 DIAGNOSIS — K75.81 METABOLIC DYSFUNCTION-ASSOCIATED STEATOHEPATITIS (MASH): ICD-10-CM

## 2024-09-12 DIAGNOSIS — K76.6 PORTAL HYPERTENSION: Primary | ICD-10-CM

## 2024-09-12 RX ORDER — CARVEDILOL 6.25 MG/1
6.25 TABLET ORAL 2 TIMES DAILY WITH MEALS
Qty: 60 TABLET | Refills: 11 | Status: SHIPPED | OUTPATIENT
Start: 2024-09-12 | End: 2025-09-12

## 2024-09-13 ENCOUNTER — TELEPHONE (OUTPATIENT)
Facility: CLINIC | Age: 84
End: 2024-09-13
Payer: MEDICARE

## 2024-10-03 RX ORDER — HYDRALAZINE HYDROCHLORIDE 25 MG/1
25 TABLET, FILM COATED ORAL 3 TIMES DAILY
Qty: 270 TABLET | Refills: 6 | Status: CANCELLED | OUTPATIENT
Start: 2024-10-03

## 2024-10-07 RX ORDER — HYDRALAZINE HYDROCHLORIDE 25 MG/1
25 TABLET, FILM COATED ORAL 3 TIMES DAILY
Qty: 270 TABLET | Refills: 6 | Status: SHIPPED | OUTPATIENT
Start: 2024-10-07

## 2024-10-14 ENCOUNTER — HOSPITAL ENCOUNTER (OUTPATIENT)
Dept: RADIOLOGY | Facility: HOSPITAL | Age: 84
Discharge: HOME | End: 2024-10-14
Attending: INTERNAL MEDICINE
Payer: MEDICARE

## 2024-10-14 DIAGNOSIS — K75.81 METABOLIC DYSFUNCTION-ASSOCIATED STEATOHEPATITIS (MASH): ICD-10-CM

## 2024-10-14 DIAGNOSIS — K74.69 COMPENSATED LIVER DISEASE: ICD-10-CM

## 2024-10-14 PROCEDURE — 76705 ECHO EXAM OF ABDOMEN: CPT | Mod: 26,,, | Performed by: RADIOLOGY

## 2024-10-14 PROCEDURE — 76705 ECHO EXAM OF ABDOMEN: CPT | Mod: TC

## 2024-10-15 ENCOUNTER — PATIENT MESSAGE (OUTPATIENT)
Dept: PRIMARY CARE CLINIC | Facility: CLINIC | Age: 84
End: 2024-10-15
Payer: MEDICARE

## 2024-11-06 ENCOUNTER — OFFICE VISIT (OUTPATIENT)
Dept: HEPATOLOGY | Facility: CLINIC | Age: 84
End: 2024-11-06
Payer: MEDICARE

## 2024-11-06 VITALS
RESPIRATION RATE: 18 BRPM | HEART RATE: 63 BPM | DIASTOLIC BLOOD PRESSURE: 87 MMHG | WEIGHT: 173.06 LBS | OXYGEN SATURATION: 96 % | BODY MASS INDEX: 27.81 KG/M2 | SYSTOLIC BLOOD PRESSURE: 138 MMHG | HEIGHT: 66 IN

## 2024-11-06 DIAGNOSIS — I85.10 SECONDARY ESOPHAGEAL VARICES WITHOUT BLEEDING: ICD-10-CM

## 2024-11-06 DIAGNOSIS — I86.4 GASTRIC VARICES WITHOUT BLEEDING: ICD-10-CM

## 2024-11-06 DIAGNOSIS — K74.69 COMPENSATED LIVER DISEASE: ICD-10-CM

## 2024-11-06 DIAGNOSIS — K31.89 PORTAL HYPERTENSIVE GASTROPATHY: ICD-10-CM

## 2024-11-06 DIAGNOSIS — K75.81 METABOLIC DYSFUNCTION-ASSOCIATED STEATOHEPATITIS (MASH): ICD-10-CM

## 2024-11-06 DIAGNOSIS — K76.6 PORTAL HYPERTENSION: ICD-10-CM

## 2024-11-06 DIAGNOSIS — K76.6 PORTAL HYPERTENSIVE GASTROPATHY: ICD-10-CM

## 2024-11-06 PROCEDURE — 99214 OFFICE O/P EST MOD 30 MIN: CPT | Mod: S$PBB,,, | Performed by: INTERNAL MEDICINE

## 2024-11-06 PROCEDURE — 99215 OFFICE O/P EST HI 40 MIN: CPT | Mod: PBBFAC | Performed by: INTERNAL MEDICINE

## 2024-11-06 PROCEDURE — 99999 PR PBB SHADOW E&M-EST. PATIENT-LVL V: CPT | Mod: PBBFAC,,, | Performed by: INTERNAL MEDICINE

## 2024-11-06 NOTE — PROGRESS NOTES
Hepatology Follow Up Note    Referring provider: No ref. provider found  PCP: May Meade MD    Chief complaint: follow up MASH cirrhosis    Last seen in clinic on: 4/23/2024    Brief HPI:   Ning Rodriguez is a 84 y.o. 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     She is accompanied by her ,  Rodriguez.     Interval Events:  - Feels ok. Denies acute complaints.  - Continues on Coreg.  - The patient denies abdominal distension, encephalopathy, jaundice, gross GI bleeding.  - Denies recent ED visits, hospitalizations.  - This is the extent of the patient's complaints at this time.    Past Medical History:   Diagnosis Date    Allergy     Arthritis     Cataract     Diabetes mellitus, type 2     Hypertension     Portal hypertension 04/23/2024    Thyroid disease        Past Surgical History:   Procedure Laterality Date    CATARACT EXTRACTION W/  INTRAOCULAR LENS IMPLANT Right 05/16/2022    Procedure: EXTRACTION, CATARACT, WITH IOL INSERTION;  Surgeon: Tami Zarate MD;  Location: [facility];  Service: Ophthalmology;  Laterality: Right;  ORA ONLY FOR CYL MEASUREMENT/TORIC DECISION    CATARACT EXTRACTION W/  INTRAOCULAR LENS IMPLANT Left 06/20/2022    Procedure: EXTRACTION, CATARACT, WITH IOL INSERTION;  Surgeon: Tami Zarate MD;  Location: [facility];  Service: Ophthalmology;  Laterality: Left;  ORA ONLY FOR CYL MEASUREMENT/TORIC DECISION    COLONOSCOPY      COLONOSCOPY N/A 9/18/2023    Procedure: COLONOSCOPY;  Surgeon: Dov Michele MD;  Location: [facility];  Service: Endoscopy;  Laterality: N/A;    ESOPHAGOGASTRODUODENOSCOPY N/A 9/18/2023    Procedure: EGD (ESOPHAGOGASTRODUODENOSCOPY);  Surgeon: Dov Michele MD;  Location: [facility];  Service: Endoscopy;  Laterality: N/A;    HEMORRHOID SURGERY         Family History   Problem Relation Name Age of Onset    Hypertension Mother      Diabetes  Mother      COPD Mother      Heart disease Father  49        AMI    Cancer Brother 3         Prostate, cancer    Cirrhosis Brother 3     Mental illness Brother 3     Hypertension Maternal Grandmother      Hypertension Maternal Grandfather      No Known Problems Paternal Grandmother      No Known Problems Paternal Grandfather         Social History     Tobacco Use    Smoking status: Former    Smokeless tobacco: Never   Substance Use Topics    Alcohol use: No     Alcohol/week: 0.0 standard drinks of alcohol    Drug use: No       Current Outpatient Medications   Medication Sig Dispense Refill    amoxicillin-clavulanate 875-125mg (AUGMENTIN) 875-125 mg per tablet Take 1 tablet by mouth 2 (two) times daily. 30 tablet 1    blood sugar diagnostic (CONTOUR TEST STRIPS) Strp Check blood sugar twice weekly plus as directed by  each 6    carvediloL (COREG) 6.25 MG tablet Take 1 tablet (6.25 mg total) by mouth 2 (two) times daily with meals. 60 tablet 11    celecoxib (CELEBREX) 100 MG capsule take 1 Capsule by mouth  twice daily as needed 180 capsule 1    ergocalciferol (ERGOCALCIFEROL) 50,000 unit Cap take 1 capsule by mouth every 7 days 4 capsule 12    ferrous sulfate 324 mg (65 mg iron) TbEC Take 1 tablet (324 mg total) by mouth once daily. (Patient taking differently: Take 325 mg by mouth once a week.) 100 tablet 3    glipiZIDE (GLUCOTROL) 5 MG tablet Take 1 Tablet(s) Oral two times a day 180 tablet 1    glipiZIDE (GLUCOTROL) 5 MG tablet Take 1 Tablet by mouth two times a day 180 tablet 1    hydrALAZINE (APRESOLINE) 25 MG tablet Take 1 tablet (25 mg total) by mouth 3 (three) times daily. 270 tablet 6    levothyroxine (SYNTHROID) 25 MCG tablet Take 1 tablet (25 mcg total) by mouth before breakfast. 30 tablet 5    pantoprazole (PROTONIX) 40 MG tablet Take 1 Tablet(s) Oral every day 90 tablet 1    promethazine-dextromethorphan (PROMETHAZINE-DM) 6.25-15 mg/5 mL Syrp Take 5 mLs by mouth nightly as needed (cough). 180 mL 0  "   cevimeline (EVOXAC) 30 mg capsule Take 1 capsule (30 mg total) by mouth 3 (three) times daily. 90 capsule 11    flu vac  65up-ivcDV98T,PF, (FLUAD QUAD -,65Y UP,,PF,) 60 mcg (15 mcg x 4)/0.5 mL Syrg inject by [facility] (Patient not taking: Reported on 2024) 0.5 mL 0    omeprazole (PRILOSEC) 20 MG capsule Take 1 capsule by mouth daily (Patient not taking: Reported on 2024) 90 capsule 3    pneumoc 20-tahir conj-dip cr,PF, (PREVNAR-20, PF,) 0.5 mL Syrg injection Inject into the muscle. (Patient not taking: Reported on 2024) 0.5 mL 0    tobramycin sulfate 0.3% (TOBREX) 0.3 % ophthalmic solution SMARTSI-2 Drop(s) In Eye(s) Every 4 Hours (Patient not taking: Reported on 2024)      tretinoin (RETIN-A) 0.05 % cream APPLY TO THE AFFECTED AREA(S) ON FACE AT BEDTIME (Patient not taking: Reported on 2024) 20 g 2    triamcinolone (NASACORT) 55 mcg nasal inhaler 2 sprays by Nasal route once daily. (Patient not taking: Reported on 2024) 10.8 g 0     Current Facility-Administered Medications   Medication Dose Route Frequency Provider Last Rate Last Admin    sodium chloride 0.9% flush 10 mL  10 mL Intravenous PRN Tami Zarate MD           Review of patient's allergies indicates:   Allergen Reactions    Augmentin [amoxicillin-pot clavulanate] Rash     Rash in legs            Vitals:    24 1448   BP: 138/87   Pulse: 63   Resp: 18   SpO2: 96%   Weight: 78.5 kg (173 lb 1 oz)   Height: 5' 6" (1.676 m)     Body mass index is 27.93 kg/m².    Physical Exam  Constitutional:       General: She is not in acute distress.  Eyes:      General: No scleral icterus.  Cardiovascular:      Rate and Rhythm: Normal rate.   Pulmonary:      Effort: Pulmonary effort is normal.   Abdominal:      General: Abdomen is flat. There is no distension.      Palpations: Abdomen is soft. There is no fluid wave.      Tenderness: There is no abdominal tenderness.   Musculoskeletal:      Right lower leg: No edema.      Left " "lower leg: No edema.   Skin:     General: Skin is warm.      Coloration: Skin is not jaundiced.   Neurological:      General: No focal deficit present.      Mental Status: She is alert and oriented to person, place, and time.      Comments: No asterixis.   Psychiatric:         Behavior: Behavior is cooperative.         Thought Content: Thought content normal.         LABS: I personally reviewed pertinent laboratory findings.    Lab Results   Component Value Date    WBC 3.04 (L) 10/14/2024    WBC 2.91 (L) 10/14/2024    HGB 12.9 10/14/2024    HGB 12.8 10/14/2024    HCT 39.9 10/14/2024    HCT 39.1 10/14/2024    MCV 94 10/14/2024    MCV 94 10/14/2024     (L) 10/14/2024    PLT 93 (L) 10/14/2024       Lab Results   Component Value Date     10/14/2024     10/14/2024    K 4.4 10/14/2024    K 4.4 10/14/2024     (H) 10/14/2024     10/14/2024    CO2 23 10/14/2024    CO2 21 (L) 10/14/2024    BUN 15 10/14/2024    BUN 14 10/14/2024    CREATININE 0.8 10/14/2024    CREATININE 0.8 10/14/2024    CALCIUM 9.1 10/14/2024    CALCIUM 8.9 10/14/2024    ANIONGAP 6 (L) 10/14/2024    ANIONGAP 8 10/14/2024    ESTGFRAFRICA >60 01/31/2019    EGFRNONAA >60 01/31/2019       Lab Results   Component Value Date    ALT 30 10/14/2024    ALT 29 10/14/2024    AST 29 10/14/2024    AST 28 10/14/2024    ALKPHOS 86 10/14/2024    ALKPHOS 86 10/14/2024    BILITOT 1.0 10/14/2024    BILITOT 1.0 10/14/2024       Lab Results   Component Value Date    HEPBCAB Non-reactive 09/19/2023    HEPCAB Non-reactive 08/23/2023     Lab Results   Component Value Date    AFP 4.1 10/14/2024       No results found for: "YAZMIN", "MITOAB", "SMOOTHMUSCAB", "IGG", "CERULOPLSM"     MELD 3.0: 9 at 10/14/2024  8:29 AM  MELD-Na: 8 at 10/14/2024  8:29 AM  Calculated from:  Serum Creatinine: 0.8 mg/dL (Using min of 1 mg/dL) at 10/14/2024  8:29 AM  Serum Sodium: 140 mmol/L (Using max of 137 mmol/L) at 10/14/2024  8:29 AM  Total Bilirubin: 1 mg/dL at 10/14/2024  " 8:29 AM  Serum Albumin: 3.7 g/dL (Using max of 3.5 g/dL) at 10/14/2024  8:29 AM  INR(ratio): 1.2 at 10/14/2024  8:29 AM  Age at listing (hypothetical): 84 years  Sex: Female at 10/14/2024  8:29 AM       IMAGING: I personally reviewed imaging studies.    Assessment:  Ning Rodriguez is a 84 y.o. 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    1. Compensated liver disease    2. Metabolic dysfunction-associated steatohepatitis (MASH)    3. Portal hypertension    4. Portal hypertensive gastropathy    5. Secondary esophageal varices without bleeding    6. Gastric varices without bleeding      #Compensated MASH Cirrhosis    Volume: Not an active issue. Na 2g diet.   Infection: No concern at this time.   Bleeding: Last EGD with EV, GOV2, PHG (9/18/23). Continue Coreg 6.25mg BID.   Encephalopathy: No prior history.  Screening: Last AFP 4.1 (10/14/24). US abdomen w/o focal lesions (10/14/24). Repeat AFP, US q6 months.  Immunization: HAV immune. Recommend HBV vaccination.  Transplant: Low MELD. Advanced age.    Return to clinic in 6 months, sooner if needed.    Kenna Medrano MD  Staff Physician  Hepatology and Liver Transplant  [facility]  [facility] [place]

## 2024-11-11 ENCOUNTER — OFFICE VISIT (OUTPATIENT)
Dept: PRIMARY CARE CLINIC | Facility: CLINIC | Age: 84
End: 2024-11-11
Payer: MEDICARE

## 2024-11-11 ENCOUNTER — E-CONSULT (OUTPATIENT)
Dept: PHARMACY | Facility: CLINIC | Age: 84
End: 2024-11-11
Payer: MEDICARE

## 2024-11-11 ENCOUNTER — TELEPHONE (OUTPATIENT)
Dept: PRIMARY CARE CLINIC | Facility: CLINIC | Age: 84
End: 2024-11-11

## 2024-11-11 VITALS
OXYGEN SATURATION: 96 % | SYSTOLIC BLOOD PRESSURE: 110 MMHG | WEIGHT: 172.63 LBS | BODY MASS INDEX: 27.74 KG/M2 | HEIGHT: 66 IN | DIASTOLIC BLOOD PRESSURE: 66 MMHG | HEART RATE: 62 BPM

## 2024-11-11 DIAGNOSIS — Z00.00 HEALTHCARE MAINTENANCE: ICD-10-CM

## 2024-11-11 DIAGNOSIS — D61.818 PANCYTOPENIA: ICD-10-CM

## 2024-11-11 DIAGNOSIS — E03.9 HYPOTHYROIDISM, UNSPECIFIED TYPE: ICD-10-CM

## 2024-11-11 DIAGNOSIS — K74.69 COMPENSATED LIVER DISEASE: ICD-10-CM

## 2024-11-11 DIAGNOSIS — I10 ESSENTIAL HYPERTENSION: ICD-10-CM

## 2024-11-11 DIAGNOSIS — E11.22 TYPE 2 DIABETES MELLITUS WITH CHRONIC KIDNEY DISEASE, WITHOUT LONG-TERM CURRENT USE OF INSULIN, UNSPECIFIED CKD STAGE: Primary | ICD-10-CM

## 2024-11-11 DIAGNOSIS — M81.0 HIGH RISK FOR FRACTURE DUE TO OSTEOPOROSIS BY DEXA SCAN: ICD-10-CM

## 2024-11-11 DIAGNOSIS — E78.5 DYSLIPIDEMIA: ICD-10-CM

## 2024-11-11 DIAGNOSIS — J30.2 SEASONAL ALLERGIES: ICD-10-CM

## 2024-11-11 DIAGNOSIS — M81.0 HIGH RISK FOR FRACTURE DUE TO OSTEOPOROSIS BY DEXA SCAN: Primary | ICD-10-CM

## 2024-11-11 PROCEDURE — 99215 OFFICE O/P EST HI 40 MIN: CPT | Mod: S$PBB,,, | Performed by: INTERNAL MEDICINE

## 2024-11-11 PROCEDURE — 99999 PR PBB SHADOW E&M-EST. PATIENT-LVL IV: CPT | Mod: PBBFAC,,, | Performed by: INTERNAL MEDICINE

## 2024-11-11 PROCEDURE — 99214 OFFICE O/P EST MOD 30 MIN: CPT | Mod: PBBFAC,PN | Performed by: INTERNAL MEDICINE

## 2024-11-11 RX ORDER — GUAIFENESIN AND DEXTROMETHORPHAN HYDROBROMIDE 1200; 60 MG/1; MG/1
1 TABLET, EXTENDED RELEASE ORAL 2 TIMES DAILY PRN
Qty: 14 TABLET | Refills: 1 | Status: SHIPPED | OUTPATIENT
Start: 2024-11-11 | End: 2024-11-21

## 2024-11-11 RX ORDER — CETIRIZINE HYDROCHLORIDE 10 MG/1
10 TABLET ORAL DAILY
Qty: 14 TABLET | Refills: 0 | Status: SHIPPED | OUTPATIENT
Start: 2024-11-11 | End: 2025-11-11

## 2024-11-11 RX ORDER — ATORVASTATIN CALCIUM 10 MG/1
10 TABLET, FILM COATED ORAL DAILY
Qty: 30 TABLET | Refills: 6 | Status: SHIPPED | OUTPATIENT
Start: 2024-11-11 | End: 2025-11-11

## 2024-11-11 NOTE — TELEPHONE ENCOUNTER
Please contact this patient when you can for a three month follow up with labs before, thank you!

## 2024-11-12 ENCOUNTER — CLINICAL SUPPORT (OUTPATIENT)
Dept: OTOLARYNGOLOGY | Facility: CLINIC | Age: 84
End: 2024-11-12
Payer: MEDICARE

## 2024-11-12 ENCOUNTER — OFFICE VISIT (OUTPATIENT)
Dept: OTOLARYNGOLOGY | Facility: CLINIC | Age: 84
End: 2024-11-12
Payer: MEDICARE

## 2024-11-12 VITALS — SYSTOLIC BLOOD PRESSURE: 139 MMHG | HEART RATE: 57 BPM | DIASTOLIC BLOOD PRESSURE: 68 MMHG

## 2024-11-12 DIAGNOSIS — H61.23 BILATERAL IMPACTED CERUMEN: ICD-10-CM

## 2024-11-12 DIAGNOSIS — H90.3 SENSORINEURAL HEARING LOSS, BILATERAL: Primary | Chronic | ICD-10-CM

## 2024-11-12 DIAGNOSIS — H90.3 SENSORINEURAL HEARING LOSS, BILATERAL: Primary | ICD-10-CM

## 2024-11-12 DIAGNOSIS — H93.11 TINNITUS OF RIGHT EAR: ICD-10-CM

## 2024-11-12 DIAGNOSIS — H60.8X3 CHRONIC ECZEMATOUS OTITIS EXTERNA OF BOTH EARS: Chronic | ICD-10-CM

## 2024-11-12 DIAGNOSIS — J30.9 CHRONIC ALLERGIC RHINITIS: Chronic | ICD-10-CM

## 2024-11-12 DIAGNOSIS — H93.11 TINNITUS OF RIGHT EAR: Chronic | ICD-10-CM

## 2024-11-12 PROCEDURE — 92557 COMPREHENSIVE HEARING TEST: CPT | Mod: PBBFAC,PN | Performed by: AUDIOLOGIST

## 2024-11-12 PROCEDURE — 92567 TYMPANOMETRY: CPT | Mod: PBBFAC,PN | Performed by: AUDIOLOGIST

## 2024-11-12 PROCEDURE — G0268 REMOVAL OF IMPACTED WAX MD: HCPCS | Mod: S$PBB,,, | Performed by: OTOLARYNGOLOGY

## 2024-11-12 PROCEDURE — 69210 REMOVE IMPACTED EAR WAX UNI: CPT | Mod: PBBFAC,PN | Performed by: OTOLARYNGOLOGY

## 2024-11-12 PROCEDURE — 99213 OFFICE O/P EST LOW 20 MIN: CPT | Mod: PBBFAC,PN | Performed by: OTOLARYNGOLOGY

## 2024-11-12 PROCEDURE — 99999 PR PBB SHADOW E&M-EST. PATIENT-LVL III: CPT | Mod: PBBFAC,,, | Performed by: OTOLARYNGOLOGY

## 2024-11-12 PROCEDURE — 99214 OFFICE O/P EST MOD 30 MIN: CPT | Mod: 25,S$PBB,, | Performed by: OTOLARYNGOLOGY

## 2024-11-12 RX ORDER — AZELASTINE 1 MG/ML
1 SPRAY, METERED NASAL 2 TIMES DAILY
Qty: 30 ML | Refills: 3 | Status: SHIPPED | OUTPATIENT
Start: 2024-11-12 | End: 2025-11-12

## 2024-11-12 RX ORDER — LEVOCETIRIZINE DIHYDROCHLORIDE 5 MG/1
5 TABLET, FILM COATED ORAL NIGHTLY
Qty: 30 TABLET | Refills: 11 | Status: SHIPPED | OUTPATIENT
Start: 2024-11-12 | End: 2025-11-12

## 2024-11-12 NOTE — PROGRESS NOTES
Chief Complaint   Patient presents with    Hearing Loss     bilateral   .     HPI:  Ning Rodriguez is a very pleasant 84 y.o. female here to see me today for the first time for evaluation of hearing loss.  She reports hearing loss that has been gradually progressing over the last several  years. More recently she has noticed that the right ear hearing has been worse. She notes that she flew on airplane about 1 week ago and feels that her ear has been clogged since. She did have URI at the time. She has noted any tinnitus in both ears. Described as a hissing noise.   She has not had any recent issues with ear pain or ear drainage.  She  has not had any previous otologic surgery.  She denies any history of significant loud noise exposure.        Past Medical History:   Diagnosis Date    Allergy     Arthritis     Cataract     Diabetes mellitus, type 2     Hypertension     Portal hypertension 04/23/2024    Thyroid disease      Social History     Socioeconomic History    Marital status:    Tobacco Use    Smoking status: Former    Smokeless tobacco: Never   Substance and Sexual Activity    Alcohol use: No     Alcohol/week: 0.0 standard drinks of alcohol    Drug use: No     Social Drivers of Health     Financial Resource Strain: Low Risk  (11/21/2023)    Overall Financial Resource Strain (CARDIA)     Difficulty of Paying Living Expenses: Not hard at all   Food Insecurity: No Food Insecurity (11/21/2023)    Hunger Vital Sign     Worried About Running Out of Food in the Last Year: Never true     Ran Out of Food in the Last Year: Never true   Transportation Needs: No Transportation Needs (11/21/2023)    PRAPARE - Transportation     Lack of Transportation (Medical): No     Lack of Transportation (Non-Medical): No   Housing Stability: Unknown (11/21/2023)    Housing Stability Vital Sign     Unable to Pay for Housing in the Last Year: No     Unstable Housing in the Last Year: No     Past Surgical History:    Procedure Laterality Date    CATARACT EXTRACTION W/  INTRAOCULAR LENS IMPLANT Right 05/16/2022    Procedure: EXTRACTION, CATARACT, WITH IOL INSERTION;  Surgeon: Tami Zarate MD;  Location: [facility];  Service: Ophthalmology;  Laterality: Right;  ORA ONLY FOR CYL MEASUREMENT/TORIC DECISION    CATARACT EXTRACTION W/  INTRAOCULAR LENS IMPLANT Left 06/20/2022    Procedure: EXTRACTION, CATARACT, WITH IOL INSERTION;  Surgeon: Tami Zarate MD;  Location: [facility] OR;  Service: Ophthalmology;  Laterality: Left;  ORA ONLY FOR CYL MEASUREMENT/TORIC DECISION    COLONOSCOPY      COLONOSCOPY N/A 9/18/2023    Procedure: COLONOSCOPY;  Surgeon: Dov Michele MD;  Location: [facility] ENDO;  Service: Endoscopy;  Laterality: N/A;    ESOPHAGOGASTRODUODENOSCOPY N/A 9/18/2023    Procedure: EGD (ESOPHAGOGASTRODUODENOSCOPY);  Surgeon: Dov Michele MD;  Location: [facility];  Service: Endoscopy;  Laterality: N/A;    HEMORRHOID SURGERY       Family History   Problem Relation Name Age of Onset    Hypertension Mother      Diabetes Mother      COPD Mother      Heart disease Father  49        AMI    Cancer Brother 3         Prostate, cancer    Cirrhosis Brother 3     Mental illness Brother 3     Hypertension Maternal Grandmother      Hypertension Maternal Grandfather      No Known Problems Paternal Grandmother      No Known Problems Paternal Grandfather           Review of Systems  General: negative for chills, fever or weight loss  Psychological: negative for mood changes or depression  Ophthalmic: negative for blurry vision, photophobia or eye pain  ENT: see HPI  Respiratory: no cough, shortness of breath, or wheezing  Cardiovascular: no chest pain or dyspnea on exertion  Gastrointestinal: no abdominal pain, change in bowel habits, or black/ bloody stools  Musculoskeletal: negative for gait disturbance or muscular weakness  Neurological: no syncope or seizures; no ataxia  Dermatological: negative for puritis,  rash and  jaundice  Hematologic/lymphatic: no easy bruising, no new lumps or bumps      Physical Exam:    Vitals:    11/12/24 1446   BP: 139/68   Pulse: (!) 57       Constitutional: Well appearing / communicating without difficutly.  NAD.  Eyes: EOM I Bilaterally  Head/Face: Normocephalic.  Negative paranasal sinus pressure/tenderness.  Salivary glands WNL.  House Brackmann I Bilaterally.    Right Ear: Auricle normal appearance. External Auditory Canal within normal limits no lesions or masses,TM w/o masses/lesions/perforations. TM mobility noted.   Left Ear: Auricle normal appearance. External Auditory Canal within normal limits no lesions or masses,TM w/o masses/lesions/perforations. TM mobility noted.   Nose: No gross nasal septal deviation. Inferior Turbinates 3+ bilaterally. No septal perforation. No masses/lesions. External nasal skin appears normal without masses/lesions.  Oral Cavity: Gingiva/lips within normal limits.  Dentition/gingiva healthy appearing. Mucus membranes moist. Floor of mouth soft, no masses palpated. Oral Tongue mobile. Hard Palate appears normal.    Oropharynx: Base of tongue appears normal. No masses/lesions noted. Tonsillar fossa/pharyngeal wall without lesions. Posterior oropharynx WNL.  Soft palate without masses. Midline uvula.   Neck/Lymphatic: No LAD I-VI bilaterally.  No thyromegaly.  No masses noted on exam.      Diagnostic studies:  Audiogram interpreted personally by me and discussed in detail with the patient today. Tympanometry revealed at Type A tympanogram for both ears. Audiogram results revealed a mild to moderately severe sensorineural hearing loss bilaterally. Speech reception thresholds were obtained at 35dB for both ears and speech discrimination scores were 64% for the right ear and 56% for the left ear.       Assessment:    ICD-10-CM ICD-9-CM    1. Sensorineural hearing loss, bilateral  H90.3 389.18       2. Tinnitus of right ear  H93.11 388.30       3. Chronic eczematous  otitis externa of both ears  H60.8X3 380.23       4. Bilateral impacted cerumen  H61.23 380.4       5. Chronic allergic rhinitis  J30.9 477.9         The primary encounter diagnosis was Sensorineural hearing loss, bilateral. Diagnoses of Tinnitus of right ear, Chronic eczematous otitis externa of both ears, Bilateral impacted cerumen, and Chronic allergic rhinitis were also pertinent to this visit.      Plan:  No orders of the defined types were placed in this encounter.      We reviewed the patient's recent audiogram and hearing loss in detail.  We also discussed that she is a good candidate for hearing aids, if and when she the patient is motivated.    We also discussed the use hearing protection when exposed to loud noise, including lawn equipment. Recommend audiogram in 1 year.     Cerumen removed today. Advised to avoid the use of q-tips and that she may use an OTC removal aid such as Debrox.     Thank you kindly for allowing me to participate in the patient's care.       Adeline Galvan MD

## 2024-11-12 NOTE — PROGRESS NOTES
Ning Rodriguez was seen today in the clinic for an audiologic evaluation.  Her main complaints were constant tinnitus and a decrease in hearing in the right ear since taking a flight while congested approximately 10 days ago. She also reported that she wears a pair of hearing aids.    Tympanometry revealed at Type A tympanogram for both ears.  Audiogram results revealed a mild to moderately severe sensorineural hearing loss bilaterally.  Speech reception thresholds were obtained at 35dB for both ears and speech discrimination scores were 64% for the right ear and 56% for the left ear.    Recommendations:  Otologic evaluation  Annual evaluation  Continue use of hearing aids  Hearing protection in noise

## 2024-11-12 NOTE — PROGRESS NOTES
Subjective:       Patient ID: Ning Rodriguez is a 84 y.o. female.    Chief Complaint: Results      HPI  Ning Rodriguez is a 84 y.o. female with  diabetes mellitus type 2,  hypothyroidism, hypertension, cirrhosis /compensated liver disease, MASH,  portal hypertension, thrombocytopenia /pancytopenia who presents today for Results    She reports an ongoing upper respiratory infection for about a week with severe congestion, particularly affecting her ears. She experienced significant nasal congestion causing difficulty breathing. Her ears remain congested, causing her to feel woozy. She has a cough, which is improving but still present, and notes the presence of mucus.  Has use saline spray for her symptoms with little.  Tries to take medications with concern of her other medications and liver disease.  In the past had nosebleeds and she is careful with nasal sprays.  Has an appointment with an ENT.    She reports a history of allergies, previously managed with frequent Zyrtec use. Currently experiencing nasal symptoms that have gone through different stages. She denies significant nasal bleeding but mentions occasional burning sensation with nasal sprays.    Her current A1C is 6.5, below the target of 7.0, stable and controlled.  Her cholesterol levels are not significantly elevated, but still above the ideal goal for diabetes management ( LDL goal of less than 70). Noted borderline results from a recent bone density scan in August, acknowledging an osteoporosis diagnosis despite the bone density not being significantly low. She agrees to follow up with the endocrinologist.  It was discuss with hepatology earlier and would be okay to use statins.    Blood pressure has been controlled.  Has some dyspnea on exertion that found to be expected.  An EKG in May showed regular rhythm and normal sinus rhythm with possible anterior infarct. She denies palpitations, shortness of breath when lying down or walking, and  leg swelling.    She takes hydralazine, levothyroxine, glipizide, and carvedilol daily. Iron supplementation is taken 3 times per week. She uses pantoprazole for reflux but is unsure about continuing its frequency. Celebrex is used only for arthritis pain. Tylenol is used sparingly due to concerns about liver effects. She denies taking antibiotics and generally minimizes medication intake.    She reports no current exercise routine due to recent travel and ongoing relocation. She expresses a desire to begin an exercise regimen with her  once settled in their new apartment.    ROS:  General: -fever, -chills, -fatigue, -weight gain, -weight loss  Eyes: -vision changes, -redness, -discharge  ENT: -ear pain, +nasal congestion, -sore throat  Cardiovascular: -chest pain, -palpitations, -lower extremity edema  Respiratory: +cough, -shortness of breath, +difficulty breathing  Gastrointestinal: -abdominal pain, -nausea, -vomiting, -diarrhea, -constipation, -blood in stool  Genitourinary: -dysuria, -hematuria, -frequency  Musculoskeletal: -joint pain, -muscle pain  Skin: -rash, -lesion  Neurological: -headache, -dizziness, -numbness, -tingling  Psychiatric: -anxiety, -depression, -sleep difficulty          Past Medical History:   Diagnosis Date    Allergy     Arthritis     Cataract     Diabetes mellitus, type 2     Hypertension     Portal hypertension 04/23/2024    Thyroid disease        Past Surgical History:   Procedure Laterality Date    CATARACT EXTRACTION W/  INTRAOCULAR LENS IMPLANT Right 05/16/2022    Procedure: EXTRACTION, CATARACT, WITH IOL INSERTION;  Surgeon: Tami Zarate MD;  Location: [facility] OR;  Service: Ophthalmology;  Laterality: Right;  ORA ONLY FOR CYL MEASUREMENT/TORIC DECISION    CATARACT EXTRACTION W/  INTRAOCULAR LENS IMPLANT Left 06/20/2022    Procedure: EXTRACTION, CATARACT, WITH IOL INSERTION;  Surgeon: Tami Zarate MD;  Location: [facility] OR;  Service: Ophthalmology;  Laterality: Left;  ORA ONLY  FOR CYL MEASUREMENT/TORIC DECISION    COLONOSCOPY      COLONOSCOPY N/A 9/18/2023    Procedure: COLONOSCOPY;  Surgeon: Dov Michele MD;  Location: [facility] ENDO;  Service: Endoscopy;  Laterality: N/A;    ESOPHAGOGASTRODUODENOSCOPY N/A 9/18/2023    Procedure: EGD (ESOPHAGOGASTRODUODENOSCOPY);  Surgeon: Dov Michele MD;  Location: [facility] ENDO;  Service: Endoscopy;  Laterality: N/A;    HEMORRHOID SURGERY         Family History   Problem Relation Name Age of Onset    Hypertension Mother      Diabetes Mother      COPD Mother      Heart disease Father  49        AMI    Cancer Brother 3         Prostate, cancer    Cirrhosis Brother 3     Mental illness Brother 3     Hypertension Maternal Grandmother      Hypertension Maternal Grandfather      No Known Problems Paternal Grandmother      No Known Problems Paternal Grandfather         Social History     Socioeconomic History    Marital status:    Tobacco Use    Smoking status: Former    Smokeless tobacco: Never   Substance and Sexual Activity    Alcohol use: No     Alcohol/week: 0.0 standard drinks of alcohol    Drug use: No     Social Drivers of Health     Financial Resource Strain: Low Risk  (11/21/2023)    Overall Financial Resource Strain (CARDIA)     Difficulty of Paying Living Expenses: Not hard at all   Food Insecurity: No Food Insecurity (11/21/2023)    Hunger Vital Sign     Worried About Running Out of Food in the Last Year: Never true     Ran Out of Food in the Last Year: Never true   Transportation Needs: No Transportation Needs (11/21/2023)    PRAPARE - Transportation     Lack of Transportation (Medical): No     Lack of Transportation (Non-Medical): No   Housing Stability: Unknown (11/21/2023)    Housing Stability Vital Sign     Unable to Pay for Housing in the Last Year: No     Unstable Housing in the Last Year: No       Current Outpatient Medications   Medication Sig Dispense Refill    blood sugar diagnostic (CONTOUR TEST STRIPS) Strp Check blood  "sugar twice weekly plus as directed by  each 6    carvediloL (COREG) 6.25 MG tablet Take 1 tablet (6.25 mg total) by mouth 2 (two) times daily with meals. 60 tablet 11    celecoxib (CELEBREX) 100 MG capsule take 1 Capsule by mouth  twice daily as needed 180 capsule 1    ergocalciferol (ERGOCALCIFEROL) 50,000 unit Cap take 1 capsule by mouth every 7 days 4 capsule 12    ferrous sulfate 324 mg (65 mg iron) TbEC Take 1 tablet (324 mg total) by mouth once daily. 100 tablet 3    glipiZIDE (GLUCOTROL) 5 MG tablet Take 1 Tablet(s) Oral two times a day 180 tablet 1    hydrALAZINE (APRESOLINE) 25 MG tablet Take 1 tablet (25 mg total) by mouth 3 (three) times daily. 270 tablet 6    levothyroxine (SYNTHROID) 25 MCG tablet Take 1 tablet (25 mcg total) by mouth before breakfast. 30 tablet 5    pantoprazole (PROTONIX) 40 MG tablet Take 1 Tablet(s) Oral every day 90 tablet 1    atorvastatin (LIPITOR) 10 MG tablet Take 1 tablet (10 mg total) by mouth once daily. 30 tablet 6    cetirizine (ZYRTEC) 10 MG tablet Take 1 tablet (10 mg total) by mouth once daily. 14 tablet 0    dextromethorphan-guaiFENesin (MUCINEX DM) 60-1,200 mg per 12 hr tablet Take 1 tablet by mouth 2 (two) times daily as needed (congestion). 14 tablet 1     Current Facility-Administered Medications   Medication Dose Route Frequency Provider Last Rate Last Admin    sodium chloride 0.9% flush 10 mL  10 mL Intravenous PRN Tami Zarate MD           Review of patient's allergies indicates:   Allergen Reactions    Augmentin [amoxicillin-pot clavulanate] Rash     Rash in legs         Objective:       Last 3 sets of Vitals        8/1/2024     2:09 PM 11/6/2024     2:48 PM 11/11/2024    11:33 AM   Vitals - 1 value per visit   SYSTOLIC 132 138 110   DIASTOLIC 80 87 66   Pulse 71 63 62   Resp  18    SPO2 97 % 96 % 96 %   Weight (lb) 176.26 173.06 172.62   Weight (kg) 79.95 78.5 78.3   Height 5' 6" (1.676 m) 5' 6" (1.676 m) 5' 6" (1.676 m)   BMI (Calculated) 28.5 27.9 " 27.9   Pain Score  Zero Zero   Physical Exam  Constitutional:       General: She is not in acute distress.     Appearance: Normal appearance.   HENT:      Head: Normocephalic.      Right Ear: External ear normal.      Left Ear: External ear normal.      Mouth/Throat:      Pharynx: No oropharyngeal exudate.      Comments: Postnasal drip noted  Eyes:      General: No scleral icterus.     Extraocular Movements: Extraocular movements intact.      Conjunctiva/sclera: Conjunctivae normal.   Neck:      Comments: No goiter.  Cardiovascular:      Rate and Rhythm: Normal rate and regular rhythm.      Pulses: Normal pulses.      Heart sounds: Normal heart sounds.      Comments: Mild murmur noted in right and sternal border  Pulmonary:      Effort: Pulmonary effort is normal.      Breath sounds: Normal breath sounds.   Abdominal:      General: Bowel sounds are normal. There is no distension.      Palpations: Abdomen is soft.   Musculoskeletal:         General: No swelling. Normal range of motion.   Lymphadenopathy:      Cervical: No cervical adenopathy.   Skin:     General: Skin is warm and dry.   Neurological:      General: No focal deficit present.      Mental Status: She is alert and oriented to person, place, and time.   Psychiatric:         Mood and Affect: Mood normal.         Behavior: Behavior normal.           CBC:  Recent Labs   Lab 05/25/24  1039 05/29/24  0842 10/14/24  0829   WBC 4.04 3.35 L 3.04 L  2.91 L   RBC 4.01 4.10 4.23  4.16   Hemoglobin 12.6 13.0 12.9  12.8   Hematocrit 37.4 38.9 39.9  39.1   Platelets 85 L 116 L 101 L  93 L   MCV 93 95 94  94   MCH 31.4 H 31.7 H 30.5  30.8   MCHC 33.7 33.4 32.3  32.7     CMP:  Recent Labs   Lab 05/25/24  1039 05/29/24  0842 10/14/24  0829   Glucose 123 H 109  111 H 110  110   Calcium 9.0 9.5  9.4 8.9  9.1   Albumin 3.4 L 3.5  3.6 3.6  3.7   Total Protein 6.8 7.3  7.4 6.7  6.8   Sodium 137 136  136 139  140   Potassium 4.2 4.9  4.8 4.4  4.4   CO2 20 L  21 L  21 L 21 L  23   Chloride 108 105  103 110  111 H   BUN 15 11  11 14  15   Creatinine 0.8 0.9  0.8 0.8  0.8   Alkaline Phosphatase 74 65  67 86  86   ALT 20 19  18 29  30   AST 22 22  22 28  29   Total Bilirubin 1.3 H 1.2 H  1.2 H 1.0  1.0     URINALYSIS:  Recent Labs   Lab 02/16/22  1042 11/20/23 2021 05/25/24  1154   Color, UA Yellow  Yellow   < > Yellow   Specific Gravity, UA 1.020  1.020   < > 1.025   pH, UA 5.0  5.0   < > 7.0   Protein, UA Negative  Negative   < > Negative   Bacteria Many A  --   --    Nitrite, UA Negative  Negative   < > Negative   Leukocytes, UA 3+ A  3+ A   < > Negative   Urobilinogen, UA  --    < > Negative    < > = values in this interval not displayed.      LIPIDS:  Recent Labs   Lab 05/29/24  0842 10/14/24  0829   TSH  --  1.920   HDL 52 73   Cholesterol 176 175   Triglycerides 82 44   LDL Cholesterol 107.6 93.2   HDL/Cholesterol Ratio 29.5 41.7   Non-HDL Cholesterol 124 102   Total Cholesterol/HDL Ratio 3.4 2.4     TSH:  Recent Labs   Lab 10/14/24  0829   TSH 1.920       A1C:  Recent Labs   Lab 10/14/24  0829   Hemoglobin A1C 6.5 H       Imaging:  US Abdomen Limited  Narrative: EXAMINATION:  US ABDOMEN LIMITED    CLINICAL HISTORY:  .    Other cirrhosis of liver    TECHNIQUE:  Limited ultrasound of the right upper quadrant of the abdomen including pancreas, liver, gallbladder, common bile duct was performed.    COMPARISON:  CT 05/25/2024    FINDINGS:  Pancreas: The visualized portions of pancreas appear normal.    Liver: Normal in size, measuring 12.9 cm. Heterogeneously increased echotexture with subtle nodular contour.  No focal hepatic lesions. Hepatorenal index 0.9.    Gallbladder: Multiple gallstones are seen.  Gallbladder sludge present.  There is no gallbladder wall thickening or pericholecystic fluid.  No sonographic De Leon's sign.    Biliary system: The common duct is not dilated, measuring 2 mm.  No intrahepatic ductal dilatation.    Spleen: Normal  in size with a homogeneous echotexture, measuring 13.1 x 5.5 cm.    Miscellaneous: No ascites.  Impression: Heterogeneous hepatic parenchymal echotexture.  No liver lesions.    Cholelithiasis.    Splenomegaly.    Electronically signed by resident: Paul Camacho  Date:    10/14/2024  Time:    09:10    Electronically signed by: Brad Diamond MD  Date:    10/14/2024  Time:    11:28      Assessment:       1. Type 2 diabetes mellitus with chronic kidney disease, without long-term current use of insulin, unspecified CKD stage    2. Compensated liver disease    3. Dyslipidemia    4. High risk for fracture due to osteoporosis by DEXA scan    5. Pancytopenia    6. Essential hypertension    7. Hypothyroidism, unspecified type    8. Seasonal allergies    9. Healthcare maintenance            Plan:       1. Type 2 diabetes mellitus with chronic kidney disease, without long-term current use of insulin, unspecified CKD stage  Overview:  Followed by endocrinologist outside [facility].  A1c apparently stable.  Has been on glipizide 5 mg twice a day without problems.  Previously tried Rybelsus 3 mg Daily and was able to  lose weight but was not tolerating side effects.    Assessment & Plan:  A1c found to be 6.5%  Explained rationale for statin therapy in diabetes management.  Continued glipizide 5 mg twice daily.      Orders:  -     CBC Auto Differential; Future; Expected date: 11/11/2024  -     Comprehensive Metabolic Panel; Future; Expected date: 11/11/2024  -     Lipid Panel; Future; Expected date: 11/11/2024  -     Hemoglobin A1C; Future; Expected date: 11/11/2024    2. Compensated liver disease  Overview:  Noted with anemia and pancytopenia, on EGD noted with varices.  Found with compensated liver disease.  Hepatology following.  On beta-blocker,  using PPIs as needed.  Would try to avoid Celebrex with varices and liver disease.    Orders:  -     Comprehensive Metabolic Panel; Future; Expected date: 11/11/2024    3.  Dyslipidemia  Overview:  With history of diabetes mellitus type 2 her  goal LDL is 70mg/dl.    Assessment & Plan:  Evaluated cholesterol levels; LDL at 93 mg/dL. The LDL goal is <70 mg/dL for diabetes management  Started atorvastatin 10 mg daily for cholesterol management.  Will discuss with her endocrinologist as she was in cholesterol medication in the past and discontinued.  Ordered cholesterol panel for late February (8-12 weeks).  Contact office if experiencing muscle aches or weakness while on atorvastatin.  consider meeting with health  to establish exercise routine once settled in new apartment.    Orders:  -     atorvastatin (LIPITOR) 10 MG tablet; Take 1 tablet (10 mg total) by mouth once daily.  Dispense: 30 tablet; Refill: 6  -     Comprehensive Metabolic Panel; Future; Expected date: 11/11/2024    4. High risk for fracture due to osteoporosis by DEXA scan  Assessment & Plan:  - Considered osteoporosis treatment options; weighing Reclast (yearly infusion) vs. Prolia (6-month injection)  - on vitamin-D and to keep good source of calcium in diet  - will discuss with her endocrinologist treatment options.  - will discuss with clinical pharmacist treatment recommendations with her history of liver disease.  - Discussed potential side effects of osteoporosis medications, including rare risks of osteonecrosis and atypical fractures.      5. Pancytopenia  Overview:  Per hematology:  - her thrombocytopenia is likely secondary to cirrhosis and portal hypertension/splenomegaly.  - iron deficiency likely related to angiectasias  - continue oral iron    Assessment & Plan:  Labs have been stable.     Continued iron supplementation 3 times weekly.  Follow-up labs.     Orders:  -     CBC Auto Differential; Future; Expected date: 11/11/2024    6. Essential hypertension  Overview:  On carvedilol 6.25 mg twice a day and hydralazine 25 mg 3 times a day.   ACE inhibitor caused cough.    Assessment & Plan:  Stable blood  pressure.    Noted mild heart murmur on exam; no immediate intervention needed given lack of associated symptoms  Continue to monitor with same treatment.    Orders:  -     Comprehensive Metabolic Panel; Future; Expected date: 11/11/2024  -     Lipid Panel; Future; Expected date: 11/11/2024    7. Hypothyroidism, unspecified type  Overview:  Follows with endocrinologist outside [facility].    On levothyroxine 25 mcg daily.    Assessment & Plan:  Reports weight gain and cold intolerance.   On low-dose levothyroxine.  Follows with endocrinologist outside [facility].  Ultrasound with small nodules not needing FNA.  Labs with normal TSH      Orders:  -     TSH; Future; Expected date: 11/11/2024  -     T4, Free; Future; Expected date: 11/11/2024    8. Seasonal allergies  Comments:  Recent viral syndrome vs allergies  Started Zyrtec as needed.  Started Mucinex DM 1200 mg extended-release every 12 hours as needed for congestion and cough.  Orders:  -     cetirizine (ZYRTEC) 10 MG tablet; Take 1 tablet (10 mg total) by mouth once daily.  Dispense: 14 tablet; Refill: 0  -     dextromethorphan-guaiFENesin (MUCINEX DM) 60-1,200 mg per 12 hr tablet; Take 1 tablet by mouth 2 (two) times daily as needed (congestion).  Dispense: 14 tablet; Refill: 1    9. Healthcare maintenance  Assessment & Plan:  - Yearly labs-  5/29/2024 but pending A1c and urine microalbumin.   - Colon cancer screening-  09/18/2023  - ACP-  completed 8/1/24  - Eye exam-  2023, referral placed. Scheduled for 12/2/24  - Foot exam-  8/1/2024  - DEXA- 8/7/24  - Vaccination-  due for RSV, COVID-19, shingles shot.            Health Maintenance Due   Topic Date Due    RSV Vaccine (Age 60+ and Pregnant patients) (1 - 1-dose 75+ series) Never done    Eye Exam  03/09/2023    Shingles Vaccine (2 of 2) 05/20/2024    Influenza Vaccine (1) 09/01/2024    COVID-19 Vaccine (5 - 2024-25 season) 09/01/2024        I spent a total of 40 minutes on the day of the visit.This includes face  to face time and non-face to face time preparing to see the patient (eg, review of tests), obtaining and/or reviewing separately obtained history, documenting clinical information in the electronic or other health record, independently interpreting results and communicating results to the patient/family/caregiver, or care coordinator.     RETURN TO CLINIC IN: 3 MONTHS    FOR NEXT VISIT: DIABETES MONITORING, REVIEW LABS, MEDICATION MONITORING, and CARE GAPS       May Meade MD  [facility]  Disclaimer:  This note has been generated using voice-recognition software. There may be grammatical or spelling errors that have been missed during proof-reading

## 2024-11-12 NOTE — ASSESSMENT & PLAN NOTE
A1c found to be 6.5%  Explained rationale for statin therapy in diabetes management.  Continued glipizide 5 mg twice daily.

## 2024-11-12 NOTE — ASSESSMENT & PLAN NOTE
Stable blood pressure.    Noted mild heart murmur on exam; no immediate intervention needed given lack of associated symptoms  Continue to monitor with same treatment.

## 2024-11-12 NOTE — ASSESSMENT & PLAN NOTE
- Considered osteoporosis treatment options; weighing Reclast (yearly infusion) vs. Prolia (6-month injection)  - on vitamin-D and to keep good source of calcium in diet  - will discuss with her endocrinologist treatment options.  - will discuss with clinical pharmacist treatment recommendations with her history of liver disease.  - Discussed potential side effects of osteoporosis medications, including rare risks of osteonecrosis and atypical fractures.

## 2024-11-12 NOTE — ASSESSMENT & PLAN NOTE
Reports weight gain and cold intolerance.   On low-dose levothyroxine.  Follows with endocrinologist outside [facility].  Ultrasound with small nodules not needing FNA.  Labs with normal TSH

## 2024-11-12 NOTE — ASSESSMENT & PLAN NOTE
- Yearly labs-  5/29/2024 but pending A1c and urine microalbumin.   - Colon cancer screening-  09/18/2023  - ACP-  completed 8/1/24  - Eye exam-  2023, referral placed. Scheduled for 12/2/24  - Foot exam-  8/1/2024  - DEXA- 8/7/24  - Vaccination-  due for RSV, COVID-19, shingles shot.

## 2024-11-12 NOTE — ASSESSMENT & PLAN NOTE
Evaluated cholesterol levels; LDL at 93 mg/dL. The LDL goal is <70 mg/dL for diabetes management  Started atorvastatin 10 mg daily for cholesterol management.  Will discuss with her endocrinologist as she was in cholesterol medication in the past and discontinued.  Ordered cholesterol panel for late February (8-12 weeks).  Contact office if experiencing muscle aches or weakness while on atorvastatin.  consider meeting with health  to establish exercise routine once settled in new apartment.

## 2024-11-12 NOTE — CONSULTS
[facility]  Response for E-Consult     Patient Name: Ning Rodriguez  MRN: [id number]  Primary Care Provider: May Meade MD   Requesting Provider: May Meade MD  Consults    Recommendation: There isn't one preferred over the other. Both medications show very similar results. Prolia shows a slightly higher increase in Bone Mineral Density for Spinal after year 1. However, patient will have to be on Prolia indefinitely if started. The side effect profile for both medications are fairly similar as well. Overall, effectiveness are about the same. It just depends on which route patient prefer and frequency.    Contingency that warrants a repeat eConsult or referral: No    Total time of Consultation: 15 minute    I did not speak to the requesting provider verbally about this.     *This eConsult is based on the clinical data available to me and is furnished without benefit of a physical examination. The eConsult will need to be interpreted in light of any clinical issues or changes in patient status not available to me at the time of filing this eConsults. Significant changes in patient condition or level of acuity should result in immediate formal consultation and reevaluation. Please alert me if you have further questions.    Thank you for this eConsult referral.     Tej Cox, Dominic  [facility]

## 2024-11-12 NOTE — PROCEDURES
Procedures    Procedure Note    DIAGNOSIS: Right Cerumen Impaction    Description:  The patient was seated in an exam chair.  An ear speculum was placed in the right EAC and was examined under the microscope.  Suction and/or loop curettes were used to remove a large cerumen impaction.  The tympanic membrane was visualized and was normal in appearance.  The patient tolerated the procedure well without complications.

## 2024-11-13 ENCOUNTER — PATIENT MESSAGE (OUTPATIENT)
Dept: PRIMARY CARE CLINIC | Facility: CLINIC | Age: 84
End: 2024-11-13
Payer: MEDICARE

## 2024-11-22 DIAGNOSIS — J30.2 SEASONAL ALLERGIES: ICD-10-CM

## 2024-11-22 RX ORDER — CETIRIZINE HYDROCHLORIDE 10 MG/1
10 TABLET ORAL DAILY
Qty: 14 TABLET | Refills: 0 | OUTPATIENT
Start: 2024-11-22 | End: 2025-11-22

## 2024-12-05 ENCOUNTER — TELEPHONE (OUTPATIENT)
Dept: PRIMARY CARE CLINIC | Facility: CLINIC | Age: 84
End: 2024-12-05
Payer: MEDICARE

## 2024-12-27 ENCOUNTER — TELEPHONE (OUTPATIENT)
Dept: HEPATOLOGY | Facility: CLINIC | Age: 84
End: 2024-12-27
Payer: MEDICARE

## 2024-12-27 ENCOUNTER — PATIENT MESSAGE (OUTPATIENT)
Dept: HEPATOLOGY | Facility: CLINIC | Age: 84
End: 2024-12-27
Payer: MEDICARE

## 2024-12-27 NOTE — TELEPHONE ENCOUNTER
Per Dr Medrano,  I told patient. Supportive care for now... unless she develops fevers, chills, or worsening abdominal pain, then she should go to the ED.    Patient verbalized understanding.

## 2024-12-27 NOTE — TELEPHONE ENCOUNTER
"----- Message from Med Assistant Long sent at 12/27/2024 10:47 AM CST -----  Regarding: FW: miss call    ----- Message -----  From: Nikhil Eng  Sent: 12/27/2024  10:42 AM CST  To: [facility] Staff  Subject: miss call                                        Consult/Advisory:        Name Of Caller: Self        Contact Preference?:[phone]        What is the nature of the call?: Returning call to Marianne        Additional Notes:  "Thank you for all that you do for our patients"  "

## 2024-12-27 NOTE — TELEPHONE ENCOUNTER
Patient is calling to ask what she should do in regard to epigastric pain, nausea and mild diarrhea.  She came back from [facility] last night.  Nausea is better, but pain is still there. It gets worse after eating and it is mix with nausea. Denies vomit.    Message to Dr Medrano.

## 2024-12-27 NOTE — TELEPHONE ENCOUNTER
----- Message from Med Assistant Long sent at 12/27/2024  1:29 PM CST -----  Regarding: FW: Patient advice  Contact: Pt  [phone]    ----- Message -----  From: Cindy Mcallister  Sent: 12/27/2024  11:38 AM CST  To: [facility] Staff  Subject: Patient advice                                         Caller: Ning      Returning call to:  Marianne      Caller can be reached at:  [phone]    Nature of the call: Returning missed call

## 2024-12-27 NOTE — TELEPHONE ENCOUNTER
Per Dr Medrano,  I called patient.  No answer.   I left message that patient should do supportive care for now... unless she develops fevers, chills, or worsening abdominal pain, then she should go to the ED.

## 2025-03-18 ENCOUNTER — TELEPHONE (OUTPATIENT)
Dept: HEPATOLOGY | Facility: CLINIC | Age: 85
End: 2025-03-18
Payer: MEDICARE

## 2025-03-18 NOTE — TELEPHONE ENCOUNTER
----- Message from Nato sent at 3/18/2025 11:17 AM CDT -----  Regarding: Scheduling Request  Contact: [phone]  Scheduling Request   Appt Type:   RTC in 6 months after the labs and US preferrably Monday morning Date/Time Preference: Prefers early May Treating Provider: Dr. Medrano Caller Name: Ning Contact Preference:   [phone] Comments/notes:

## 2025-03-20 ENCOUNTER — OFFICE VISIT (OUTPATIENT)
Dept: OPTOMETRY | Facility: CLINIC | Age: 85
End: 2025-03-20
Payer: MEDICARE

## 2025-03-20 DIAGNOSIS — E11.00 TYPE 2 DIABETES MELLITUS WITH HYPEROSMOLARITY WITHOUT COMA, WITHOUT LONG-TERM CURRENT USE OF INSULIN: ICD-10-CM

## 2025-03-20 DIAGNOSIS — H04.123 DRY EYE SYNDROME, BILATERAL: Primary | ICD-10-CM

## 2025-03-20 DIAGNOSIS — E11.9 TYPE 2 DIABETES MELLITUS WITHOUT OPHTHALMIC MANIFESTATIONS: ICD-10-CM

## 2025-03-20 DIAGNOSIS — H26.493 PCO (POSTERIOR CAPSULAR OPACIFICATION), BILATERAL: ICD-10-CM

## 2025-03-20 DIAGNOSIS — Z96.1 PSEUDOPHAKIA OF BOTH EYES: ICD-10-CM

## 2025-03-20 DIAGNOSIS — I10 ESSENTIAL HYPERTENSION: ICD-10-CM

## 2025-03-20 PROBLEM — H25.12 NUCLEAR SCLEROTIC CATARACT OF LEFT EYE: Status: RESOLVED | Noted: 2022-05-16 | Resolved: 2025-03-20

## 2025-03-20 PROCEDURE — 99999 PR PBB SHADOW E&M-EST. PATIENT-LVL II: CPT | Mod: PBBFAC,,, | Performed by: OPTOMETRIST

## 2025-03-20 PROCEDURE — 92014 COMPRE OPH EXAM EST PT 1/>: CPT | Mod: S$PBB,,, | Performed by: OPTOMETRIST

## 2025-03-20 PROCEDURE — 99212 OFFICE O/P EST SF 10 MIN: CPT | Mod: PBBFAC,PO | Performed by: OPTOMETRIST

## 2025-03-20 NOTE — PROGRESS NOTES
MIRNA    TESFAYE: 9/19/2022 - Dr. Townsend     CC: Pt is here today for a routine eye exam. Pt states that her vision has   been stable since her last exam.      (+)Dryness  (-)Burning  (-)Itchiness  (-)Tearing  (-)Flashes  (-)Floaters   (+)Photophobia  (-)Eye Pain      Diabetic: yes  A1C: 6.5 on 10/14/2024    Contact Lens: no    Eye Meds: none    PD: 63.0    Last edited by Lala Santoro MA on 3/20/2025 10:47 AM.            Assessment /Plan     For exam results, see Encounter Report.    Dry eye syndrome, bilateral   Use IVIZIA daily/PRN    Type 2 diabetes mellitus with hyperosmolarity without coma, without long-term current use of insulin  Type 2 diabetes mellitus without ophthalmic manifestations  Essential hypertension   No retinopathy, monitor yearly    Pseudophakia of both eyes  PCO (posterior capsular opacification), bilateral   Pt happy with vision at this time, consult for YAG when ready   Monitor yearly    RTC 1 year

## 2025-05-04 DIAGNOSIS — E78.5 DYSLIPIDEMIA: ICD-10-CM

## 2025-05-05 ENCOUNTER — HOSPITAL ENCOUNTER (OUTPATIENT)
Dept: RADIOLOGY | Facility: HOSPITAL | Age: 85
Discharge: HOME | End: 2025-05-05
Attending: INTERNAL MEDICINE
Payer: MEDICARE

## 2025-05-05 ENCOUNTER — RESULTS FOLLOW-UP (OUTPATIENT)
Dept: TRANSPLANT | Facility: CLINIC | Age: 85
End: 2025-05-05

## 2025-05-05 DIAGNOSIS — K75.81 METABOLIC DYSFUNCTION-ASSOCIATED STEATOHEPATITIS (MASH): ICD-10-CM

## 2025-05-05 DIAGNOSIS — K76.6 PORTAL HYPERTENSIVE GASTROPATHY: ICD-10-CM

## 2025-05-05 DIAGNOSIS — K31.89 PORTAL HYPERTENSIVE GASTROPATHY: ICD-10-CM

## 2025-05-05 DIAGNOSIS — K76.6 PORTAL HYPERTENSION: ICD-10-CM

## 2025-05-05 DIAGNOSIS — K74.69 COMPENSATED LIVER DISEASE: ICD-10-CM

## 2025-05-05 PROCEDURE — 76705 ECHO EXAM OF ABDOMEN: CPT | Mod: 26,,, | Performed by: RADIOLOGY

## 2025-05-05 PROCEDURE — 76705 ECHO EXAM OF ABDOMEN: CPT | Mod: TC

## 2025-05-05 RX ORDER — ATORVASTATIN CALCIUM 10 MG/1
10 TABLET, FILM COATED ORAL DAILY
Qty: 30 TABLET | Refills: 6 | Status: SHIPPED | OUTPATIENT
Start: 2025-05-05 | End: 2026-05-05

## 2025-05-08 ENCOUNTER — OFFICE VISIT (OUTPATIENT)
Dept: PRIMARY CARE CLINIC | Facility: CLINIC | Age: 85
End: 2025-05-08
Payer: MEDICARE

## 2025-05-08 ENCOUNTER — TELEPHONE (OUTPATIENT)
Dept: PRIMARY CARE CLINIC | Facility: CLINIC | Age: 85
End: 2025-05-08
Payer: MEDICARE

## 2025-05-08 VITALS
WEIGHT: 171.06 LBS | DIASTOLIC BLOOD PRESSURE: 70 MMHG | BODY MASS INDEX: 27.61 KG/M2 | SYSTOLIC BLOOD PRESSURE: 130 MMHG

## 2025-05-08 DIAGNOSIS — M72.0 DUPUYTREN CONTRACTURE OF LEFT HAND: ICD-10-CM

## 2025-05-08 DIAGNOSIS — E78.5 DYSLIPIDEMIA: ICD-10-CM

## 2025-05-08 DIAGNOSIS — Z00.00 HEALTHCARE MAINTENANCE: ICD-10-CM

## 2025-05-08 DIAGNOSIS — E03.9 HYPOTHYROIDISM, UNSPECIFIED TYPE: ICD-10-CM

## 2025-05-08 DIAGNOSIS — E11.22 TYPE 2 DIABETES MELLITUS WITH CHRONIC KIDNEY DISEASE, WITHOUT LONG-TERM CURRENT USE OF INSULIN, UNSPECIFIED CKD STAGE: Primary | ICD-10-CM

## 2025-05-08 DIAGNOSIS — I10 ESSENTIAL HYPERTENSION: ICD-10-CM

## 2025-05-08 DIAGNOSIS — Z91.89 FRACTURE RISK ASSESSMENT SCORE (FRAX) INDICATING GREATER THAN 20% RISK FOR MAJOR OSTEOPOROSIS-RELATED FRACTURE: ICD-10-CM

## 2025-05-08 DIAGNOSIS — D61.818 PANCYTOPENIA: ICD-10-CM

## 2025-05-08 DIAGNOSIS — K76.6 PORTAL HYPERTENSION: ICD-10-CM

## 2025-05-08 DIAGNOSIS — K74.69 COMPENSATED LIVER DISEASE: ICD-10-CM

## 2025-05-08 DIAGNOSIS — R06.09 DOE (DYSPNEA ON EXERTION): ICD-10-CM

## 2025-05-08 PROCEDURE — 99214 OFFICE O/P EST MOD 30 MIN: CPT | Mod: PBBFAC,PN | Performed by: INTERNAL MEDICINE

## 2025-05-08 PROCEDURE — 99999 PR PBB SHADOW E&M-EST. PATIENT-LVL IV: CPT | Mod: PBBFAC,,, | Performed by: INTERNAL MEDICINE

## 2025-05-08 RX ORDER — CLOTRIMAZOLE 1 %
CREAM (GRAM) TOPICAL 2 TIMES DAILY
Qty: 30 G | Refills: 5 | Status: SHIPPED | OUTPATIENT
Start: 2025-05-08

## 2025-05-08 NOTE — PROGRESS NOTES
Subjective:       Patient ID: Ning Rodriguez is a 85 y.o. female.    Chief Complaint: Follow-up      HPI  Ning Rodriguez is a 85 y.o. female with  diabetes mellitus type 2,  hypothyroidism, hypertension, cirrhosis /compensated liver disease, MASH,  portal hypertension, thrombocytopenia /pancytopenia who presents today for Follow-up    Ning presents today for follow up    She contracted COVID-19 during an 18-day  trip to [place]. She experienced high fever with delirium. Despite recommendation for hospitalization, she declined due to language barrier concerns. She received treatment with cold packs and remote consultation with her home physician, reporting complete recovery within three days.    A1C was 6.5 with fasting glucose of 127. Cholesterol levels showed improvement in October but not at goal. She has been taking Atorvastatin for 6-8 months.    Recent lab work: no anemia, improved platelets, acceptable WBC count with slightly low lymphocytes. Renal function good, glucose slightly elevated at 127 (fasting). Bilirubin levels approaching normal range.    She reports variable sleep quality, particularly noting difficulty falling asleep when anxious about upcoming tasks or responsibilities.    She reports intermittent short-term memory issues, including forgetting recent conversations and misplacing items. However, she maintains good long-term memory and denies impact on daily activities such as bill paying.    She has a friction line on her foot present for couple months, denying associated pain. She has history of an ingrown toenail previously treated by a podiatrist.    Bone density test results indicate borderline risk for hip fracture that would be compatible with oareoporosis by Frax score. Takes supplements, has not been treated for osteoporosis.     She maintains calcium intake through daily milk and cheese consumption and is mindful of dietary choices due to liver condition. She takes  weekly high-dose vitamin D supplement.      ROS:  General: +fever, +chills, -fatigue, -weight gain, -weight loss  Eyes: -vision changes, -redness, -discharge  ENT: -ear pain, -nasal congestion, -sore throat  Cardiovascular: -chest pain, -palpitations, +lower extremity edema  Respiratory: -cough, -shortness of breath, +exertional dyspnea  Gastrointestinal: -abdominal pain, -nausea, -vomiting, -diarrhea, -constipation, -blood in stool  Genitourinary: -dysuria, -hematuria, -frequency  Musculoskeletal: -joint pain, -muscle pain  Skin: -rash, -lesion  Neurological: -headache, -dizziness, -numbness, -tingling, +confusion or disorientation, +sleep disturbances, +memory loss, +memory problems  Psychiatric: -anxiety, -depression, -sleep difficulty            Past Medical History:   Diagnosis Date    Allergy     Arthritis     Cataract     Diabetes mellitus, type 2     Hypertension     Portal hypertension 04/23/2024    Thyroid disease        Past Surgical History:   Procedure Laterality Date    CATARACT EXTRACTION W/  INTRAOCULAR LENS IMPLANT Right 05/16/2022    Procedure: EXTRACTION, CATARACT, WITH IOL INSERTION;  Surgeon: Tami Zarate MD;  Location: [facility];  Service: Ophthalmology;  Laterality: Right;  ORA ONLY FOR CYL MEASUREMENT/TORIC DECISION    CATARACT EXTRACTION W/  INTRAOCULAR LENS IMPLANT Left 06/20/2022    Procedure: EXTRACTION, CATARACT, WITH IOL INSERTION;  Surgeon: Tami Zarate MD;  Location: [facility];  Service: Ophthalmology;  Laterality: Left;  ORA ONLY FOR CYL MEASUREMENT/TORIC DECISION    COLONOSCOPY      COLONOSCOPY N/A 9/18/2023    Procedure: COLONOSCOPY;  Surgeon: Dov Michele MD;  Location: [facility];  Service: Endoscopy;  Laterality: N/A;    ESOPHAGOGASTRODUODENOSCOPY N/A 9/18/2023    Procedure: EGD (ESOPHAGOGASTRODUODENOSCOPY);  Surgeon: Dov Michele MD;  Location: [facility];  Service: Endoscopy;  Laterality: N/A;    HEMORRHOID SURGERY         Family History   Problem Relation Name Age  "of Onset    Hypertension Mother      Diabetes Mother      COPD Mother      Heart disease Father  49        AMI    Cancer Brother 3         Prostate, cancer    Cirrhosis Brother 3     Mental illness Brother 3     Hypertension Maternal Grandmother      Hypertension Maternal Grandfather      No Known Problems Paternal Grandmother      No Known Problems Paternal Grandfather         Social History     Socioeconomic History    Marital status:    Tobacco Use    Smoking status: Former    Smokeless tobacco: Never   Substance and Sexual Activity    Alcohol use: No     Alcohol/week: 0.0 standard drinks of alcohol    Drug use: No     Social Drivers of Health     Financial Resource Strain: Low Risk  (11/21/2023)    Overall Financial Resource Strain (CARDIA)     Difficulty of Paying Living Expenses: Not hard at all   Food Insecurity: No Food Insecurity (11/21/2023)    Hunger Vital Sign     Worried About Running Out of Food in the Last Year: Never true     Ran Out of Food in the Last Year: Never true   Transportation Needs: No Transportation Needs (11/21/2023)    PRAPARE - Transportation     Lack of Transportation (Medical): No     Lack of Transportation (Non-Medical): No   Housing Stability: Unknown (11/21/2023)    Housing Stability Vital Sign     Unable to Pay for Housing in the Last Year: No     Unstable Housing in the Last Year: No       Current Medications[1]    Review of patient's allergies indicates:   Allergen Reactions    Augmentin [amoxicillin-pot clavulanate] Rash     Rash in legs         Objective:       Last 3 sets of Vitals        3/20/2025    10:47 AM 5/8/2025     9:50 AM 5/9/2025    10:50 AM   Vitals - 1 value per visit   SYSTOLIC  130 121   DIASTOLIC  70 59   Pulse   67   Resp   18   SPO2   97 %   Weight (lb)  171.08 174.38   Weight (kg)  77.6 79.1   Height   5' 6" (1.676 m)   BMI (Calculated)   28.2   Pain Score Zero Zero Zero   Physical Exam  Constitutional:       General: She is not in acute " distress.  HENT:      Head: Normocephalic.      Right Ear: Tympanic membrane, ear canal and external ear normal.      Left Ear: Tympanic membrane, ear canal and external ear normal.      Nose: Nose normal.      Mouth/Throat:      Mouth: Mucous membranes are moist.   Eyes:      General: No scleral icterus.     Extraocular Movements: Extraocular movements intact.      Conjunctiva/sclera: Conjunctivae normal.   Neck:      Vascular: No carotid bruit.      Comments: No goiter.  Cardiovascular:      Rate and Rhythm: Normal rate and regular rhythm.      Pulses: Normal pulses.      Heart sounds: Murmur (mild) heard.   Pulmonary:      Effort: Pulmonary effort is normal.      Breath sounds: Normal breath sounds.   Abdominal:      General: Bowel sounds are normal. There is no distension.      Palpations: Abdomen is soft. There is no mass.      Tenderness: There is no abdominal tenderness.   Musculoskeletal:         General: No swelling.   Lymphadenopathy:      Cervical: No cervical adenopathy.   Skin:     General: Skin is warm and dry.   Neurological:      General: No focal deficit present.      Mental Status: She is alert and oriented to person, place, and time.   Psychiatric:         Mood and Affect: Mood normal.         Behavior: Behavior normal.     Protective Sensation (w/ 10 gram monofilament):  Right: Intact  Left: Intact    Visual Inspection:  Dry Skin -  Bilateral and Onychomycosis -  Bilateral    Pedal Pulses:   Right: Present  Left: Present    Posterior Tibialis Pulses:   Right:Present  Left: Present   CBC:  Recent Labs   Lab 05/29/24  0842 10/14/24  0829 05/05/25  0851   WBC 3.35 L 3.04 L  2.91 L 3.53 L   RBC 4.10 4.23  4.16 4.58   Hemoglobin 13.0 12.9  12.8  --    HGB  --   --  13.5   Hematocrit 38.9 39.9  39.1  --    HCT  --   --  43.5   Platelet Count  --   --  112 L   Platelets 116 L 101 L  93 L  --    MCV 95 94  94 95   MCH 31.7 H 30.5  30.8 29.5   MCHC 33.4 32.3  32.7 31.0 L     CMP:  Recent Labs    Lab 05/29/24  0842 10/14/24  0829 05/05/25  0851   Glucose 109  111 H 110  110 127 H   Calcium 9.5  9.4 8.9  9.1 9.2   Albumin 3.5  3.6 3.6  3.7 3.6   Protein Total  --   --  7.2   Total Protein 7.3  7.4 6.7  6.8  --    Sodium 136  136 139  140 138   Potassium 4.9  4.8 4.4  4.4 4.8   CO2 21 L  21 L 21 L  23 23   Chloride 105  103 110  111 H 108   BUN 11  11 14  15 18   Creatinine 0.9  0.8 0.8  0.8 0.8   Alkaline Phosphatase 65  67 86  86  --    ALP  --   --  83   ALT 19  18 29  30 26   AST 22  22 28  29 26   Total Bilirubin 1.2 H  1.2 H 1.0  1.0  --    Bilirubin Total  --   --  1.1 H     URINALYSIS:  Recent Labs   Lab 05/25/24  1154   Color, UA Yellow   Specific Gravity, UA 1.025   pH, UA 7.0   Protein, UA Negative   Nitrite, UA Negative   Leukocytes, UA Negative   Urobilinogen, UA Negative      LIPIDS:  Recent Labs   Lab 05/29/24  0842 10/14/24  0829   TSH  --  1.920   HDL 52 73   Cholesterol 176 175   Triglycerides 82 44   LDL Cholesterol 107.6 93.2   HDL/Cholesterol Ratio 29.5 41.7   Non-HDL Cholesterol 124 102   Total Cholesterol/HDL Ratio 3.4 2.4     TSH:  Recent Labs   Lab 10/14/24  0829   TSH 1.920       A1C:  Recent Labs   Lab 10/14/24  0829   Hemoglobin A1C 6.5 H       Imaging:  US Abdomen Limited  Narrative: EXAMINATION:  US ABDOMEN LIMITED    CLINICAL HISTORY:  .    Portal hypertension    TECHNIQUE:  Limited ultrasound of the right upper quadrant of the abdomen including pancreas, liver, gallbladder, common bile duct was performed.    COMPARISON:  CT 05/25/2020    FINDINGS:  Pancreas: The visualized portions of pancreas appear normal.    Liver: Normal in size, measuring 12.1 cm. Nodular contour with heterogeneous echotexture.  No focal hepatic lesions.    Gallbladder: Multiple gallstones are seen.  There is no gallbladder wall thickening or pericholecystic fluid.  No sonographic De Leon's sign.    Biliary system: The common duct is not dilated, measuring 2 mm.  No intrahepatic  ductal dilatation.    Spleen: Enlarged measuring 12.7 x 5.1 cm.    Miscellaneous: No ascites.  Impression: Hepatic cirrhosis.  No focal hepatic lesions.    Splenomegaly.    Electronically signed by resident: Alex Reyes  Date:    05/05/2025  Time:    09:55    Electronically signed by: Rishi Mccarty MD  Date:    05/05/2025  Time:    10:57      Assessment:       1. Type 2 diabetes mellitus with chronic kidney disease, without long-term current use of insulin, unspecified CKD stage    2. Essential hypertension    3. Dyslipidemia    4. Hypothyroidism, unspecified type    5. Pancytopenia    6. Compensated liver disease    7. Portal hypertension    8. HAMM (dyspnea on exertion)    9. Dupuytren contracture of left hand    10. Fracture Risk Assessment Score (FRAX) indicating greater than 20% risk for major osteoporosis-related fracture    11. Healthcare maintenance            Plan:       1. Type 2 diabetes mellitus with chronic kidney disease, without long-term current use of insulin, unspecified CKD stage  Overview:  Followed by endocrinologist outside [facility].  A1c apparently stable.  Has been on glipizide 5 mg twice a day without problems.  Previously tried Rybelsus 3 mg Daily and was able to  lose weight but was not tolerating side effects.    Assessment & Plan:  - Monitored fasting glucose level (127) and previous HbA1c (6.5).  - Ordered A1C test, which can be done at [facility] without fasting.  - Referred patient to endocrinologist as would like to have an endocrinologist with [facility].    - continue present treatment.  Could consider Jardiance or Farxiga which may help his lower extremity swelling and some weight loss.    Orders:  -     Hemoglobin A1C; Future; Expected date: 05/08/2025  -     Lipid Panel; Future; Expected date: 05/08/2025  -     Ambulatory referral/consult to Endocrinology; Future; Expected date: 05/08/2025    2. Essential hypertension  Overview:  On carvedilol 6.25 mg twice a day and  hydralazine 25 mg 3 times a day.   ACE inhibitor caused cough.      3. Dyslipidemia  Overview:  With history of diabetes mellitus type 2 her  goal LDL is 70mg/dl.    Assessment & Plan:  - Monitored cholesterol from October, with LDL at 90.  - Target LDL goal is less than 70 due to diabetes.  - Ordered fasting cholesterol test to evaluate if atorvastatin adjustment is needed.  - May need higher strength of statin to reach goal LDL of <70.      4. Hypothyroidism, unspecified type  Overview:  Follows with endocrinologist outside [facility].    On levothyroxine 25 mcg daily.    Assessment & Plan:  Last TSH was within normal limits.  Continue same treatment.      5. Pancytopenia  Overview:  Per hematology:  - her thrombocytopenia is likely secondary to cirrhosis and portal hypertension/splenomegaly.  - iron deficiency likely related to angiectasias  - continue oral iron      6. Compensated liver disease  Overview:  Noted with anemia and pancytopenia, on EGD noted with varices.  Found with compensated liver disease.  Hepatology following.  On beta-blocker,  using PPIs as needed.  Try to avoid Celebrex with varices and liver disease, only use sparingly.      7. Portal hypertension  Assessment & Plan:  Has history of cirrhosis and noted with small esophageal varices on previous imaging.  Labs have been stable and follows with hepatology.      8. HAMM (dyspnea on exertion)  Comments:  - Detected mild cardiac murmur. Ordered echocardiogram for evaluation, particularly indicated if patient experiences shortness of breath with exertion.  Overview:  - Detected mild cardiac murmur. Ordered echocardiogram for evaluation, particularly indicated if patient experiences shortness of breath with exertion.    Orders:  -     Echo; Future; Expected date: 05/08/2025    9. Dupuytren contracture of left hand  Assessment & Plan:  - Evaluated hand contracture, likely Dupuytren's, which can be associated with diabetes.  - Discussed treatment  options including therapy, injections, and surgery.  - Recommend hand exercises using stress balls or therapy putty to improve mobility.  - Wants to try conservative management before seeing hand surgery.      10. Fracture Risk Assessment Score (FRAX) indicating greater than 20% risk for major osteoporosis-related fracture  Assessment & Plan:  - Considered osteoporosis treatment options; weighing Reclast (yearly infusion) vs. Prolia (6-month injection)  - on vitamin-D and to keep good source of calcium in diet.  - T score is acceptable but Frax score is borderline high. Favors conservative management. We could reevaluate in 1-2 years with DEXA but needs to take precautions to prevent fractures.  - She will discuss with her endocrinologist treatment options.  - Discussed with clinical pharmacist, Reclast would be ok to use.  - Discussed potential side effects of osteoporosis medications, including rare risks of osteonecrosis and atypical fractures, aches. Calcium needs to be monitored.      11. Healthcare maintenance  Assessment & Plan:  - Yearly labs-  10/14//24  - Colon cancer screening-  09/18/2023  - ACP-  completed 8/1/24  - Eye exam-  3/20/25  - Foot exam-  5/8/25  - DEXA- 8/7/24  - Vaccination-  due for RSV, COVID-19, shingles shot.      Other orders  -     clotrimazole (LOTRIMIN) 1 % cream; Apply topically 2 (two) times daily.  Dispense: 30 g; Refill: 5       TINEA PEDIS:  - Evaluated darkening of skin on foot, likely fungal in nature.  - Prescribed topical antifungal cream to be applied to affected areas to determine if condition improves.      GENERAL HEALTH RECOMMENDATIONS:  - Discussed importance of calcium intake for bone health, suggesting fortified products (orange juice, yogurt, milk, cheese) as alternatives to supplements.  - Recommend increased physical activity, with chair exercises or walking as low-impact options.    FOLLOW-UP:  - Return in 3-4 months.  - Contact office if medication refills are  needed.        Health Maintenance Due   Topic Date Due    Shingles Vaccine (2 of 2) 05/20/2024    COVID-19 Vaccine (5 - 2024-25 season) 09/01/2024    Hemoglobin A1c  04/14/2025        I spent a total of 40 minutes on the day of the visit.  This includes face to face time and non-face to face time preparing to see the patient (eg, review of tests), obtaining and/or reviewing separately obtained history, documenting clinical information in the electronic or other health record, independently interpreting results and communicating results to the patient/family/caregiver, or care coordinator.   RETURN TO CLINIC IN: 3 MONTHS    FOR NEXT VISIT: BLOOD PRESSURE MONITORING, DIABETES MONITORING, REVIEW LABS, and MEDICATION MONITORING       May Meade MD  [facility]  Disclaimer:  This note has been generated using voice-recognition software. There may be grammatical or spelling errors that have been missed during proof-reading           [1]   Current Outpatient Medications   Medication Sig Dispense Refill    atorvastatin (LIPITOR) 10 MG tablet Take 1 tablet (10 mg total) by mouth once daily. 30 tablet 6    azelastine (ASTELIN) 137 mcg (0.1 %) nasal spray Use 1 spray in each nostril 2 (two) times daily. 30 mL 3    carvediloL (COREG) 6.25 MG tablet Take 1 tablet (6.25 mg total) by mouth 2 (two) times daily with meals. 60 tablet 11    celecoxib (CELEBREX) 100 MG capsule take 1 Capsule by mouth  twice daily as needed 180 capsule 1    cetirizine (ZYRTEC) 10 MG tablet Take 1 tablet (10 mg total) by mouth once daily. 14 tablet 0    ferrous sulfate 324 mg (65 mg iron) TbEC Take 1 tablet (324 mg total) by mouth once daily. 100 tablet 3    glipiZIDE (GLUCOTROL) 5 MG tablet Take 1 Tablet(s) Oral two times a day 180 tablet 1    hydrALAZINE (APRESOLINE) 25 MG tablet Take 1 tablet (25 mg total) by mouth 3 (three) times daily. 270 tablet 6    levothyroxine (SYNTHROID) 25 MCG tablet Take 1 tablet (25 mcg total) by mouth before  breakfast. 30 tablet 5    pantoprazole (PROTONIX) 40 MG tablet Take 1 Tablet(s) Oral every day 90 tablet 1    blood sugar diagnostic (CONTOUR TEST STRIPS) Strp Check blood sugar twice weekly plus as directed by MD (Patient not taking: Reported on 5/9/2025) 100 each 6    clotrimazole (LOTRIMIN) 1 % cream Apply topically 2 (two) times daily. 30 g 5    ergocalciferol (ERGOCALCIFEROL) 50,000 unit Cap take 1 capsule by mouth every 7 days 4 capsule 12    levocetirizine (XYZAL) 5 MG tablet Take 1 tablet (5 mg total) by mouth every evening. (Patient not taking: Reported on 5/9/2025) 30 tablet 11     Current Facility-Administered Medications   Medication Dose Route Frequency Provider Last Rate Last Admin    sodium chloride 0.9% flush 10 mL  10 mL Intravenous PRN Tami Zarate MD

## 2025-05-08 NOTE — PATIENT INSTRUCTIONS
Foods and drinks with calcium  Food Calcium in milligrams   Milk (skim, 2%, or whole; 8 oz [240 mL]) 300   Yogurt (6 oz [168 g]) 250   Orange juice (with calcium; 8 oz [240 mL]) 300   Tofu with calcium (0.5 cup [113 g]) 435   Cheese (1 oz [28 g]) 195 to 335 (hard cheese = higher calcium)   Cottage cheese (0.5 cup [113 g]) 130   Ice cream or frozen yogurt (0.5 cup [113 g]) 100   Fortified non-dairy milks (soy, oat, almond; 8 oz [240 mL]) 300 to 450   Beans (0.5 cup cooked [113 g]) 60 to 80   Dark, leafy green vegetables (0.5 cup cooked [113 g]) 50 to 135   Almonds (24 whole) 70   Orange (1 medium) 60   Graphic 05383 Version 8.0  © 2025 [employer] and/or its affiliates. All Rights Reserved.

## 2025-05-09 ENCOUNTER — OFFICE VISIT (OUTPATIENT)
Dept: HEPATOLOGY | Facility: CLINIC | Age: 85
End: 2025-05-09
Payer: MEDICARE

## 2025-05-09 VITALS
BODY MASS INDEX: 28.03 KG/M2 | DIASTOLIC BLOOD PRESSURE: 59 MMHG | HEIGHT: 66 IN | WEIGHT: 174.38 LBS | HEART RATE: 67 BPM | OXYGEN SATURATION: 97 % | RESPIRATION RATE: 18 BRPM | SYSTOLIC BLOOD PRESSURE: 121 MMHG

## 2025-05-09 DIAGNOSIS — I86.4 GASTRIC VARICES WITHOUT BLEEDING: ICD-10-CM

## 2025-05-09 DIAGNOSIS — K76.6 PORTAL HYPERTENSIVE GASTROPATHY: ICD-10-CM

## 2025-05-09 DIAGNOSIS — K31.89 PORTAL HYPERTENSIVE GASTROPATHY: ICD-10-CM

## 2025-05-09 DIAGNOSIS — K76.6 PORTAL HYPERTENSION: ICD-10-CM

## 2025-05-09 DIAGNOSIS — I85.10 SECONDARY ESOPHAGEAL VARICES WITHOUT BLEEDING: ICD-10-CM

## 2025-05-09 DIAGNOSIS — K74.69 COMPENSATED LIVER DISEASE: ICD-10-CM

## 2025-05-09 DIAGNOSIS — K75.81 METABOLIC DYSFUNCTION-ASSOCIATED STEATOHEPATITIS (MASH): ICD-10-CM

## 2025-05-09 PROBLEM — M72.0 DUPUYTREN CONTRACTURE OF LEFT HAND: Status: ACTIVE | Noted: 2025-05-09

## 2025-05-09 PROBLEM — R06.09 DOE (DYSPNEA ON EXERTION): Status: ACTIVE | Noted: 2025-05-09

## 2025-05-09 PROBLEM — Z91.89 FRACTURE RISK ASSESSMENT SCORE (FRAX) INDICATING GREATER THAN 20% RISK FOR MAJOR OSTEOPOROSIS-RELATED FRACTURE: Status: ACTIVE | Noted: 2024-11-11

## 2025-05-09 PROCEDURE — 99214 OFFICE O/P EST MOD 30 MIN: CPT | Mod: PBBFAC | Performed by: INTERNAL MEDICINE

## 2025-05-09 PROCEDURE — 99999 PR PBB SHADOW E&M-EST. PATIENT-LVL IV: CPT | Mod: PBBFAC,,, | Performed by: INTERNAL MEDICINE

## 2025-05-09 NOTE — PROGRESS NOTES
Hepatology Follow Up Note    Referring provider: No ref. provider found  PCP: May Meade MD    Chief complaint: follow up MASH cirrhosis    Last seen in clinic on: 11/6/2024    Brief HPI:  Ning Rodriguez is a 85 y.o. 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     She is accompanied by her , Dr. Joshuae.     Interval Events:  - Feels ok. Denies acute complaints.  - Continues on Coreg.  - Adhering to low salt diet.  - The patient denies abdominal distension, encephalopathy, jaundice, gross GI bleeding.  - Denies recent ED visits, hospitalizations.  - This is the extent of the patient's complaints at this time.    Past Medical History:   Diagnosis Date    Allergy     Arthritis     Cataract     Diabetes mellitus, type 2     Hypertension     Portal hypertension 04/23/2024    Thyroid disease        Past Surgical History:   Procedure Laterality Date    CATARACT EXTRACTION W/  INTRAOCULAR LENS IMPLANT Right 05/16/2022    Procedure: EXTRACTION, CATARACT, WITH IOL INSERTION;  Surgeon: Tami Zarate MD;  Location: [facility];  Service: Ophthalmology;  Laterality: Right;  ORA ONLY FOR CYL MEASUREMENT/TORIC DECISION    CATARACT EXTRACTION W/  INTRAOCULAR LENS IMPLANT Left 06/20/2022    Procedure: EXTRACTION, CATARACT, WITH IOL INSERTION;  Surgeon: Tami Zarate MD;  Location: [facility] OR;  Service: Ophthalmology;  Laterality: Left;  ORA ONLY FOR CYL MEASUREMENT/TORIC DECISION    COLONOSCOPY      COLONOSCOPY N/A 9/18/2023    Procedure: COLONOSCOPY;  Surgeon: Dov Michele MD;  Location: [facility];  Service: Endoscopy;  Laterality: N/A;    ESOPHAGOGASTRODUODENOSCOPY N/A 9/18/2023    Procedure: EGD (ESOPHAGOGASTRODUODENOSCOPY);  Surgeon: Dov Michele MD;  Location: [facility];  Service: Endoscopy;  Laterality: N/A;    HEMORRHOID SURGERY         Family History   Problem Relation Name Age of Onset     Hypertension Mother      Diabetes Mother      COPD Mother      Heart disease Father  49        AMI    Cancer Brother 3         Prostate, cancer    Cirrhosis Brother 3     Mental illness Brother 3     Hypertension Maternal Grandmother      Hypertension Maternal Grandfather      No Known Problems Paternal Grandmother      No Known Problems Paternal Grandfather         Social History     Tobacco Use    Smoking status: Former    Smokeless tobacco: Never   Substance Use Topics    Alcohol use: No     Alcohol/week: 0.0 standard drinks of alcohol    Drug use: No       Current Outpatient Medications   Medication Sig Dispense Refill    atorvastatin (LIPITOR) 10 MG tablet Take 1 tablet (10 mg total) by mouth once daily. 30 tablet 6    azelastine (ASTELIN) 137 mcg (0.1 %) nasal spray Use 1 spray in each nostril 2 (two) times daily. 30 mL 3    carvediloL (COREG) 6.25 MG tablet Take 1 tablet (6.25 mg total) by mouth 2 (two) times daily with meals. 60 tablet 11    celecoxib (CELEBREX) 100 MG capsule take 1 Capsule by mouth  twice daily as needed 180 capsule 1    cetirizine (ZYRTEC) 10 MG tablet Take 1 tablet (10 mg total) by mouth once daily. 14 tablet 0    clotrimazole (LOTRIMIN) 1 % cream Apply topically 2 (two) times daily. 30 g 5    ergocalciferol (ERGOCALCIFEROL) 50,000 unit Cap take 1 capsule by mouth every 7 days 4 capsule 12    ferrous sulfate 324 mg (65 mg iron) TbEC Take 1 tablet (324 mg total) by mouth once daily. 100 tablet 3    glipiZIDE (GLUCOTROL) 5 MG tablet Take 1 Tablet(s) Oral two times a day 180 tablet 1    hydrALAZINE (APRESOLINE) 25 MG tablet Take 1 tablet (25 mg total) by mouth 3 (three) times daily. 270 tablet 6    levothyroxine (SYNTHROID) 25 MCG tablet Take 1 tablet (25 mcg total) by mouth before breakfast. 30 tablet 5    pantoprazole (PROTONIX) 40 MG tablet Take 1 Tablet(s) Oral every day 90 tablet 1    blood sugar diagnostic (CONTOUR TEST STRIPS) Strp Check blood sugar twice weekly plus as directed by  "MD (Patient not taking: Reported on 5/9/2025) 100 each 6    levocetirizine (XYZAL) 5 MG tablet Take 1 tablet (5 mg total) by mouth every evening. (Patient not taking: Reported on 5/9/2025) 30 tablet 11     Current Facility-Administered Medications   Medication Dose Route Frequency Provider Last Rate Last Admin    sodium chloride 0.9% flush 10 mL  10 mL Intravenous PRN Tami Zarate MD           Review of patient's allergies indicates:   Allergen Reactions    Augmentin [amoxicillin-pot clavulanate] Rash     Rash in legs            Vitals:    05/09/25 1050   BP: (!) 121/59   Pulse: 67   Resp: 18   SpO2: 97%   Weight: 79.1 kg (174 lb 6.1 oz)   Height: 5' 6" (1.676 m)   Body mass index is 28.15 kg/m².    Physical Exam  Constitutional:       General: She is not in acute distress.  Eyes:      General: No scleral icterus.  Cardiovascular:      Rate and Rhythm: Normal rate.   Pulmonary:      Effort: Pulmonary effort is normal.   Abdominal:      General: Abdomen is flat. There is no distension.      Palpations: Abdomen is soft. There is no fluid wave.      Tenderness: There is no abdominal tenderness.   Musculoskeletal:      Right lower leg: No edema.      Left lower leg: No edema.   Skin:     General: Skin is warm.      Coloration: Skin is not jaundiced.   Neurological:      General: No focal deficit present.      Mental Status: She is alert and oriented to person, place, and time.      Comments: No asterixis.   Psychiatric:         Behavior: Behavior is cooperative.         Thought Content: Thought content normal.         LABS: I personally reviewed pertinent laboratory findings.    Lab Results   Component Value Date    WBC 3.53 (L) 05/05/2025    HGB 13.5 05/05/2025    HCT 43.5 05/05/2025    MCV 95 05/05/2025     (L) 05/05/2025       Lab Results   Component Value Date     05/05/2025    K 4.8 05/05/2025     05/05/2025    CO2 23 05/05/2025    BUN 18 05/05/2025    CREATININE 0.8 05/05/2025    CALCIUM 9.2 " "05/05/2025    ANIONGAP 7 (L) 05/05/2025    ESTGFRAFRICA >60 01/31/2019    EGFRNONAA >60 01/31/2019       Lab Results   Component Value Date    ALT 26 05/05/2025    AST 26 05/05/2025    ALKPHOS 83 05/05/2025    BILITOT 1.1 (H) 05/05/2025       Lab Results   Component Value Date    HEPBCAB Non-reactive 09/19/2023    HEPCAB Non-reactive 08/23/2023     Lab Results   Component Value Date    AFP 4.6 05/05/2025       No results found for: "YAZMIN", "MITOAB", "SMOOTHMUSCAB", "IGG", "CERULOPLSM"     MELD 3.0: 9 at 5/5/2025  8:51 AM  MELD-Na: 9 at 5/5/2025  8:51 AM  Calculated from:  Serum Creatinine: 0.8 mg/dL (Using min of 1 mg/dL) at 5/5/2025  8:51 AM  Serum Sodium: 138 mmol/L (Using max of 137 mmol/L) at 5/5/2025  8:51 AM  Total Bilirubin: 1.1 mg/dL at 5/5/2025  8:51 AM  Serum Albumin: 3.6 g/dL (Using max of 3.5 g/dL) at 5/5/2025  8:51 AM  INR(ratio): 1.2 at 5/5/2025  8:51 AM  Age at listing (hypothetical): 85 years  Sex: Female at 5/5/2025  8:51 AM       IMAGING: I personally reviewed imaging studies.    Assessment:  Ning Rodriguez is 85 y.o. 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    1. Compensated liver disease    2. Metabolic dysfunction-associated steatohepatitis (MASH)    3. Portal hypertension    4. Secondary esophageal varices without bleeding    5. Gastric varices without bleeding    6. Portal hypertensive gastropathy      #Compensated MASH Cirrhosis    Volume: Not an active issue. Na 2g diet.   Infection: No concern at this time.   Bleeding: Last EGD with EV, GOV2, PHG (9/18/23). Continue Coreg 6.25mg BID.   Encephalopathy: No prior history.  Screening: Last AFP 4.6 (5/5/25). US abdomen w/o focal lesions (5/5/25). Repeat AFP, US q6 months.  Immunization: HAV immune. Recommend HBV vaccination.  Transplant: Low MELD. Advanced age.    Return to clinic in Jan 2025, sooner if needed.    Kenna Medrano, " MD  Staff Physician  Hepatology and Liver Transplant  [facility]  [facility]

## 2025-05-10 NOTE — ASSESSMENT & PLAN NOTE
- Monitored cholesterol from October, with LDL at 90.  - Target LDL goal is less than 70 due to diabetes.  - Ordered fasting cholesterol test to evaluate if atorvastatin adjustment is needed.  - May need higher strength of statin to reach goal LDL of <70.

## 2025-05-10 NOTE — ASSESSMENT & PLAN NOTE
- Yearly labs-  10/14//24  - Colon cancer screening-  09/18/2023  - ACP-  completed 8/1/24  - Eye exam-  3/20/25  - Foot exam-  5/8/25  - DEXA- 8/7/24  - Vaccination-  due for RSV, COVID-19, shingles shot.

## 2025-05-10 NOTE — ASSESSMENT & PLAN NOTE
- Monitored fasting glucose level (127) and previous HbA1c (6.5).  - Ordered A1C test, which can be done at [facility] without fasting.  - Referred patient to endocrinologist as would like to have an endocrinologist with [facility].    - continue present treatment.  Could consider Jardiance or Farxiga which may help his lower extremity swelling and some weight loss.

## 2025-05-10 NOTE — ASSESSMENT & PLAN NOTE
- Considered osteoporosis treatment options; weighing Reclast (yearly infusion) vs. Prolia (6-month injection)  - on vitamin-D and to keep good source of calcium in diet.  - T score is acceptable but Frax score is borderline high. Favors conservative management. We could reevaluate in 1-2 years with DEXA but needs to take precautions to prevent fractures.  - She will discuss with her endocrinologist treatment options.  - Discussed with clinical pharmacist, Reclast would be ok to use.  - Discussed potential side effects of osteoporosis medications, including rare risks of osteonecrosis and atypical fractures, aches. Calcium needs to be monitored.

## 2025-05-10 NOTE — ASSESSMENT & PLAN NOTE
- Evaluated hand contracture, likely Dupuytren's, which can be associated with diabetes.  - Discussed treatment options including therapy, injections, and surgery.  - Recommend hand exercises using stress balls or therapy putty to improve mobility.  - Wants to try conservative management before seeing hand surgery.

## 2025-05-10 NOTE — ASSESSMENT & PLAN NOTE
Has history of cirrhosis and noted with small esophageal varices on previous imaging.  Labs have been stable and follows with hepatology.

## 2025-05-16 ENCOUNTER — LAB VISIT (OUTPATIENT)
Dept: LAB | Facility: HOSPITAL | Age: 85
End: 2025-05-16
Attending: INTERNAL MEDICINE
Payer: MEDICARE

## 2025-05-16 DIAGNOSIS — E11.22 TYPE 2 DIABETES MELLITUS WITH CHRONIC KIDNEY DISEASE, WITHOUT LONG-TERM CURRENT USE OF INSULIN, UNSPECIFIED CKD STAGE: ICD-10-CM

## 2025-05-16 LAB
CHOLEST SERPL-MCNC: 150 MG/DL (ref 120–199)
CHOLEST/HDLC SERPL: 2.1 {RATIO} (ref 2–5)
EAG (OHS): 137 MG/DL (ref 68–131)
HBA1C MFR BLD: 6.4 % (ref 4–5.6)
HDLC SERPL-MCNC: 71 MG/DL (ref 40–75)
HDLC SERPL: 47.3 % (ref 20–50)
LDLC SERPL CALC-MCNC: 65.8 MG/DL (ref 63–159)
NONHDLC SERPL-MCNC: 79 MG/DL
TRIGL SERPL-MCNC: 66 MG/DL (ref 30–150)

## 2025-05-16 PROCEDURE — 83036 HEMOGLOBIN GLYCOSYLATED A1C: CPT

## 2025-05-16 PROCEDURE — 82465 ASSAY BLD/SERUM CHOLESTEROL: CPT

## 2025-05-16 PROCEDURE — 36415 COLL VENOUS BLD VENIPUNCTURE: CPT

## 2025-05-19 ENCOUNTER — PATIENT MESSAGE (OUTPATIENT)
Dept: PRIMARY CARE CLINIC | Facility: CLINIC | Age: 85
End: 2025-05-19
Payer: MEDICARE

## 2025-06-08 RX ORDER — AZELASTINE 1 MG/ML
1 SPRAY, METERED NASAL 2 TIMES DAILY
Qty: 30 ML | Refills: 3 | Status: CANCELLED | OUTPATIENT
Start: 2025-06-08 | End: 2026-06-08

## 2025-06-09 RX ORDER — AZELASTINE 1 MG/ML
1 SPRAY, METERED NASAL 2 TIMES DAILY
Qty: 30 ML | Refills: 3 | Status: SHIPPED | OUTPATIENT
Start: 2025-06-09 | End: 2026-06-09

## 2025-06-11 ENCOUNTER — HOSPITAL ENCOUNTER (OUTPATIENT)
Dept: CARDIOLOGY | Facility: HOSPITAL | Age: 85
Discharge: HOME | End: 2025-06-11
Attending: INTERNAL MEDICINE
Payer: MEDICARE

## 2025-06-11 VITALS
DIASTOLIC BLOOD PRESSURE: 72 MMHG | HEIGHT: 66 IN | BODY MASS INDEX: 27.97 KG/M2 | WEIGHT: 174 LBS | HEART RATE: 58 BPM | SYSTOLIC BLOOD PRESSURE: 128 MMHG

## 2025-06-11 DIAGNOSIS — R06.09 DOE (DYSPNEA ON EXERTION): ICD-10-CM

## 2025-06-11 LAB
AORTIC SIZE INDEX (SOV): 1.5 CM/M2
AORTIC SIZE INDEX: 1.4 CM/M2
ASCENDING AORTA: 2.7 CM
AV AREA BY CONTINUOUS VTI: 1.2 CM2
AV INDEX (PROSTH): 0.39
AV LVOT MEAN GRADIENT: 2 MMHG
AV LVOT PEAK GRADIENT: 5 MMHG
AV MEAN GRADIENT: 15 MMHG
AV PEAK GRADIENT: 29 MMHG
AV VALVE AREA BY VELOCITY RATIO: 1.3 CM²
AV VALVE AREA: 1.2 CM2
AV VELOCITY RATIO: 0.41
BSA FOR ECHO PROCEDURE: 1.92 M2
CV ECHO LV RWT: 0.38 CM
DOP CALC AO PEAK VEL: 2.7 M/S
DOP CALC AO VTI: 71.3 CM
DOP CALC LVOT AREA: 3.1 CM2
DOP CALC LVOT DIAMETER: 2 CM
DOP CALC LVOT PEAK VEL: 1.1 M/S
DOP CALC LVOT STROKE VOLUME: 88.2 CM3
DOP CALC RVOT AREA: 3.8 CM2
DOP CALC RVOT DIAMETER: 2.2 CM
DOP CALCLVOT PEAK VEL VTI: 28.1 CM
E WAVE DECELERATION TIME: 221 MS
E/A RATIO: 1.09
E/E' RATIO: 18 M/S
ECHO EF ESTIMATED: 73 %
ECHO LV POSTERIOR WALL: 0.9 CM (ref 0.6–1.1)
FRACTIONAL SHORTENING: 41.7 % (ref 28–44)
HR MV ECHO: 58 BPM
INTERVENTRICULAR SEPTUM: 0.9 CM (ref 0.6–1.1)
IVC DIAMETER: 1.49 CM
LA MAJOR: 4.4 CM
LA MINOR: 4.6 CM
LA WIDTH: 3.5 CM
LEFT ATRIUM SIZE: 3.5 CM
LEFT ATRIUM VOLUME INDEX MOD: 23 ML/M2
LEFT ATRIUM VOLUME INDEX: 25 ML/M2
LEFT ATRIUM VOLUME MOD: 43 ML
LEFT ATRIUM VOLUME: 47 CM3
LEFT INTERNAL DIMENSION IN SYSTOLE: 2.8 CM (ref 2.1–4)
LEFT VENTRICLE DIASTOLIC VOLUME INDEX: 56.61 ML/M2
LEFT VENTRICLE DIASTOLIC VOLUME: 107 ML
LEFT VENTRICLE MASS INDEX: 78.2 G/M2
LEFT VENTRICLE SYSTOLIC VOLUME INDEX: 15.3 ML/M2
LEFT VENTRICLE SYSTOLIC VOLUME: 29 ML
LEFT VENTRICULAR INTERNAL DIMENSION IN DIASTOLE: 4.8 CM (ref 3.5–6)
LEFT VENTRICULAR MASS: 147.8 G
LV LATERAL E/E' RATIO: 17.7
LV SEPTAL E/E' RATIO: 17.7
MV A" WAVE DURATION": 125.59 MS
MV MEAN GRADIENT: 3 MMHG
MV PEAK A VEL: 1.14 M/S
MV PEAK E VEL: 1.24 M/S
MV PEAK GRADIENT: 7 MMHG
OHS CV RV/LV RATIO: 0.58 CM
PISA TR MAX VEL: 2.8 M/S
PULM VEIN A" WAVE DURATION": 125.59 MS
PULM VEIN S/D RATIO: 0.96
PULMONIC VEIN PEAK A VELOCITY: 0.4 M/S
PV PEAK D VEL: 0.57 M/S
PV PEAK S VEL: 0.55 M/S
RA MAJOR: 4.55 CM
RA PRESSURE ESTIMATED: 3 MMHG
RA WIDTH: 3.34 CM
RIGHT ATRIAL AREA: 14.3 CM2
RIGHT VENTRICLE DIASTOLIC BASEL DIMENSION: 2.8 CM
RV TB RVSP: 6 MMHG
RV TISSUE DOPPLER FREE WALL SYSTOLIC VELOCITY 1 (APICAL 4 CHAMBER VIEW): 18.89 CM/S
SINUS: 2.82 CM
STJ: 2.7 CM
TDI LATERAL: 0.07 M/S
TDI SEPTAL: 0.07 M/S
TDI: 0.07 M/S
TRICUSPID ANNULAR PLANE SYSTOLIC EXCURSION: 2.2 CM
TV PEAK GRADIENT: 31 MMHG
TV REST PULMONARY ARTERY PRESSURE: 34 MMHG
Z-SCORE OF LEFT VENTRICULAR DIMENSION IN END DIASTOLE: -0.97
Z-SCORE OF LEFT VENTRICULAR DIMENSION IN END SYSTOLE: -1.2

## 2025-06-11 PROCEDURE — 93306 TTE W/DOPPLER COMPLETE: CPT

## 2025-06-11 PROCEDURE — 93306 TTE W/DOPPLER COMPLETE: CPT | Mod: 26,,, | Performed by: INTERNAL MEDICINE

## 2025-06-12 ENCOUNTER — PATIENT MESSAGE (OUTPATIENT)
Dept: PRIMARY CARE CLINIC | Facility: CLINIC | Age: 85
End: 2025-06-12
Payer: MEDICARE

## 2025-06-23 ENCOUNTER — TELEPHONE (OUTPATIENT)
Dept: PRIMARY CARE CLINIC | Facility: CLINIC | Age: 85
End: 2025-06-23
Payer: MEDICARE

## 2025-06-23 ENCOUNTER — HOSPITAL ENCOUNTER (OUTPATIENT)
Dept: RADIOLOGY | Facility: HOSPITAL | Age: 85
Discharge: HOME | End: 2025-06-23
Attending: INTERNAL MEDICINE
Payer: MEDICARE

## 2025-06-23 DIAGNOSIS — Z00.00 ENCOUNTER FOR MEDICARE ANNUAL WELLNESS EXAM: ICD-10-CM

## 2025-06-23 DIAGNOSIS — R05.1 ACUTE COUGH: ICD-10-CM

## 2025-06-23 DIAGNOSIS — R05.1 ACUTE COUGH: Primary | ICD-10-CM

## 2025-06-23 PROCEDURE — 71046 X-RAY EXAM CHEST 2 VIEWS: CPT | Mod: TC

## 2025-06-23 PROCEDURE — 71046 X-RAY EXAM CHEST 2 VIEWS: CPT | Mod: 26,,, | Performed by: RADIOLOGY

## 2025-06-24 ENCOUNTER — PATIENT MESSAGE (OUTPATIENT)
Dept: PRIMARY CARE CLINIC | Facility: CLINIC | Age: 85
End: 2025-06-24
Payer: MEDICARE

## 2025-06-26 ENCOUNTER — TELEPHONE (OUTPATIENT)
Dept: PRIMARY CARE CLINIC | Facility: CLINIC | Age: 85
End: 2025-06-26
Payer: MEDICARE

## 2025-07-28 RX ORDER — CLOTRIMAZOLE 1 %
CREAM (GRAM) TOPICAL 2 TIMES DAILY
Qty: 30 G | Refills: 5 | Status: SHIPPED | OUTPATIENT
Start: 2025-07-28

## 2025-08-07 ENCOUNTER — TELEPHONE (OUTPATIENT)
Dept: PRIMARY CARE CLINIC | Facility: CLINIC | Age: 85
End: 2025-08-07
Payer: MEDICARE

## 2025-08-07 NOTE — TELEPHONE ENCOUNTER
Called pt, no answer, left message that she has an appt tomorrow with Dr. Meade and to please call if she is not able to attend appt. Phone number left for call back.

## 2025-08-08 ENCOUNTER — OFFICE VISIT (OUTPATIENT)
Dept: PRIMARY CARE CLINIC | Facility: CLINIC | Age: 85
End: 2025-08-08
Payer: MEDICARE

## 2025-08-08 VITALS
OXYGEN SATURATION: 96 % | HEART RATE: 59 BPM | BODY MASS INDEX: 28.43 KG/M2 | DIASTOLIC BLOOD PRESSURE: 60 MMHG | SYSTOLIC BLOOD PRESSURE: 109 MMHG | WEIGHT: 176.13 LBS

## 2025-08-08 DIAGNOSIS — E11.22 TYPE 2 DIABETES MELLITUS WITH CHRONIC KIDNEY DISEASE, WITHOUT LONG-TERM CURRENT USE OF INSULIN, UNSPECIFIED CKD STAGE: Primary | ICD-10-CM

## 2025-08-08 DIAGNOSIS — L29.9 PRURITUS: ICD-10-CM

## 2025-08-08 DIAGNOSIS — E03.9 HYPOTHYROIDISM, UNSPECIFIED TYPE: ICD-10-CM

## 2025-08-08 DIAGNOSIS — R32 URINARY INCONTINENCE, UNSPECIFIED TYPE: ICD-10-CM

## 2025-08-08 DIAGNOSIS — E78.5 DYSLIPIDEMIA: ICD-10-CM

## 2025-08-08 DIAGNOSIS — Z00.00 HEALTHCARE MAINTENANCE: ICD-10-CM

## 2025-08-08 DIAGNOSIS — I85.00 ESOPHAGEAL VARICES WITHOUT BLEEDING, UNSPECIFIED ESOPHAGEAL VARICES TYPE: ICD-10-CM

## 2025-08-08 PROCEDURE — 99213 OFFICE O/P EST LOW 20 MIN: CPT | Mod: PBBFAC,PN | Performed by: INTERNAL MEDICINE

## 2025-08-08 PROCEDURE — 99215 OFFICE O/P EST HI 40 MIN: CPT | Mod: S$PBB,,, | Performed by: INTERNAL MEDICINE

## 2025-08-08 PROCEDURE — 99999 PR PBB SHADOW E&M-EST. PATIENT-LVL III: CPT | Mod: PBBFAC,,, | Performed by: INTERNAL MEDICINE

## 2025-08-08 RX ORDER — DAPAGLIFLOZIN 5 MG/1
5 TABLET, FILM COATED ORAL DAILY
Qty: 90 TABLET | Refills: 3 | Status: SHIPPED | OUTPATIENT
Start: 2025-08-08 | End: 2025-08-08

## 2025-08-08 RX ORDER — CARVEDILOL 6.25 MG/1
6.25 TABLET ORAL 2 TIMES DAILY WITH MEALS
Qty: 180 TABLET | Refills: 3 | Status: SHIPPED | OUTPATIENT
Start: 2025-08-08 | End: 2026-08-08

## 2025-08-08 RX ORDER — ATORVASTATIN CALCIUM 10 MG/1
10 TABLET, FILM COATED ORAL DAILY
Qty: 90 TABLET | Refills: 3 | Status: SHIPPED | OUTPATIENT
Start: 2025-08-08 | End: 2026-08-08

## 2025-08-08 RX ORDER — DAPAGLIFLOZIN 5 MG/1
5 TABLET, FILM COATED ORAL DAILY
Qty: 30 TABLET | Refills: 2 | Status: SHIPPED | OUTPATIENT
Start: 2025-08-08 | End: 2025-08-08

## 2025-08-08 RX ORDER — DAPAGLIFLOZIN 5 MG/1
5 TABLET, FILM COATED ORAL DAILY
Qty: 30 TABLET | Refills: 3 | Status: SHIPPED | OUTPATIENT
Start: 2025-08-08

## 2025-08-08 NOTE — PROGRESS NOTES
Subjective:       Patient ID: Ning Rodriguez is a 85 y.o. female.    Chief Complaint: Health Maintenance      HPI  Ning Rodriguez is a 85 y.o. female with diabetes mellitus type 2,  hypothyroidism, hypertension, cirrhosis /compensated liver disease, MASH,  portal hypertension, thrombocytopenia /pancytopenia who presents today for Health Maintenance    Ning presents today for follow-up    DIABETES:  She reports ongoing diabetes management with glipizide 5 mg daily. She previously tried Rybelsus but did not tolerate it. Fasting glucose was 127 in May, which is above the desired target range of less than 110. She does not check blood sugars frequently but has committed to checking glucose daily. She is actively attempting to control salt intake and denies trying other diabetes medications such as Januvia. She acknowledges increased urination may be associated with potential high blood sugar.    DERMATOLOGIC:  She reports generalized itching involving legs, feet, and hands without associated rash. Symptoms are most bothersome when settling down to sleep. She uses Eucerin anti-itch cream intermittently, only applying when symptoms are pronounced. She acknowledges skin appears dry and recognizes inconsistent cream application may contribute to symptoms. She reports decreased water intake recently and suspects this might exacerbate skin dryness. She denies allergic etiology and denies experiencing burning or yeast-related symptoms.    GENITOURINARY:  She reports experiencing urinary incontinence with mild leakage and occasional involuntary urine loss when attempting to reach the bathroom. She expresses interest in addressing the issue through pelvic floor exercises and potential further evaluation by a urologist to assess bladder function and emptying.    MUSCULOSKELETAL:  She reports mechanical knee pain when descending stairs. Pain is localized and does not cause weakness. She is able to navigate stairs  without significant functional limitation.    GASTROINTESTINAL:  She reports no constipation and describes herself as extremely regular. She maintains a diet high in fiber and actively monitors her fiber intake.    CARDIOVASCULAR:  She previously underwent an echocardiogram due to concerns about shortness of breath. Echocardiogram results were within acceptable limits. She is currently able to walk through airports without experiencing significant respiratory difficulties.    THYROID:  Thyroid ultrasound from last year revealed multiple small nodules that were deemed too small to require intervention. She does not express concern about these findings and appears comfortable with continued monitoring. Has endocrinology appointment in September.    MEMORY AND COGNITIVE FUNCTION:  She reports memory has been good. She independently manages personal finances, paying bills and handling financial responsibilities without difficulty. She continues to perform routine daily tasks as she has previously.    CURRENT MEDICATIONS:  She is currently taking atorvastatin 10 mg for cholesterol management, levothyroxine daily, and carvedilol. She reports having adequate supply of these medications at home and understanding of medication regimen.    LABS/TEST RESULTS:  Lab results from May showed no evidence of anemia, stable and improved platelets, kidney function within normal limits, normal thyroid function, and cholesterol less than 70.    VACCINATION STATUS:  She requires second shingles vaccine and declines COVID-19 vaccine.    ROS:  General: -fever, -chills, -fatigue, -weight gain, -weight loss  Eyes: -vision changes, -redness, -discharge  ENT: -ear pain, -nasal congestion, -sore throat  Cardiovascular: -chest pain, -palpitations, -lower extremity edema  Respiratory: -cough, -shortness of breath  Gastrointestinal: -abdominal pain, -nausea, -vomiting, -diarrhea, -constipation, -blood in stool  Genitourinary: -dysuria, -hematuria,  -frequency, +urinary incontinence  Musculoskeletal: +joint pain, -muscle pain, +pain with movement  Skin: -rash, -lesion, +itching, +dry skin  Neurological: -headache, -dizziness, -numbness, -tingling  Psychiatric: -anxiety, -depression, -sleep difficulty          Past Medical History:   Diagnosis Date    Allergy     Arthritis     Cataract     Diabetes mellitus, type 2     Hypertension     Portal hypertension 04/23/2024    Thyroid disease        Past Surgical History:   Procedure Laterality Date    CATARACT EXTRACTION W/  INTRAOCULAR LENS IMPLANT Right 05/16/2022    Procedure: EXTRACTION, CATARACT, WITH IOL INSERTION;  Surgeon: Tami Zarate MD;  Location: [facility] OR;  Service: Ophthalmology;  Laterality: Right;  ORA ONLY FOR CYL MEASUREMENT/TORIC DECISION    CATARACT EXTRACTION W/  INTRAOCULAR LENS IMPLANT Left 06/20/2022    Procedure: EXTRACTION, CATARACT, WITH IOL INSERTION;  Surgeon: Tami Zarate MD;  Location: [facility] OR;  Service: Ophthalmology;  Laterality: Left;  ORA ONLY FOR CYL MEASUREMENT/TORIC DECISION    COLONOSCOPY      COLONOSCOPY N/A 9/18/2023    Procedure: COLONOSCOPY;  Surgeon: Dov Michele MD;  Location: [facility];  Service: Endoscopy;  Laterality: N/A;    ESOPHAGOGASTRODUODENOSCOPY N/A 9/18/2023    Procedure: EGD (ESOPHAGOGASTRODUODENOSCOPY);  Surgeon: Dov Michele MD;  Location: [facility];  Service: Endoscopy;  Laterality: N/A;    HEMORRHOID SURGERY         Family History   Problem Relation Name Age of Onset    Hypertension Mother      Diabetes Mother      COPD Mother      Heart disease Father  49        AMI    Cancer Brother 3         Prostate, cancer    Cirrhosis Brother 3     Mental illness Brother 3     Hypertension Maternal Grandmother      Hypertension Maternal Grandfather      No Known Problems Paternal Grandmother      No Known Problems Paternal Grandfather         Social History     Socioeconomic History    Marital status:    Tobacco Use    Smoking status: Former     "Smokeless tobacco: Never   Substance and Sexual Activity    Alcohol use: No     Alcohol/week: 0.0 standard drinks of alcohol    Drug use: No     Social Drivers of Health     Financial Resource Strain: Low Risk  (11/21/2023)    Overall Financial Resource Strain (CARDIA)     Difficulty of Paying Living Expenses: Not hard at all   Food Insecurity: No Food Insecurity (11/21/2023)    Hunger Vital Sign     Worried About Running Out of Food in the Last Year: Never true     Ran Out of Food in the Last Year: Never true   Transportation Needs: No Transportation Needs (11/21/2023)    PRAPARE - Transportation     Lack of Transportation (Medical): No     Lack of Transportation (Non-Medical): No   Housing Stability: Unknown (11/21/2023)    Housing Stability Vital Sign     Unable to Pay for Housing in the Last Year: No     Unstable Housing in the Last Year: No       Current Medications[1]    Review of patient's allergies indicates:   Allergen Reactions    Augmentin [amoxicillin-pot clavulanate] Rash     Rash in legs         Objective:       Last 3 sets of Vitals        5/9/2025    10:50 AM 6/11/2025     8:43 AM 8/8/2025    10:47 AM   Vitals - 1 value per visit   SYSTOLIC 121 128 109   DIASTOLIC 59 72 60   Pulse 67 58 59   Resp 18     SPO2 97 %  96 %   Weight (lb) 174.38 174 176.15   Weight (kg) 79.1 78.926 79.9   Height 5' 6" (1.676 m) 5' 6" (1.676 m)    BMI (Calculated) 28.2 28.1    Pain Score Zero  Zero   Physical Exam  Constitutional:       General: She is not in acute distress.     Appearance: Normal appearance.   HENT:      Head: Normocephalic.      Mouth/Throat:      Mouth: Mucous membranes are moist.   Eyes:      General: No scleral icterus.     Extraocular Movements: Extraocular movements intact.      Conjunctiva/sclera: Conjunctivae normal.   Neck:      Comments: No goiter.  Cardiovascular:      Rate and Rhythm: Normal rate and regular rhythm.      Pulses: Normal pulses.      Heart sounds: Murmur heard.   Pulmonary:      " Effort: Pulmonary effort is normal.      Breath sounds: Normal breath sounds.   Abdominal:      General: Bowel sounds are normal. There is no distension.      Palpations: Abdomen is soft. There is no mass.      Tenderness: There is no abdominal tenderness.   Musculoskeletal:         General: No swelling. Normal range of motion.   Lymphadenopathy:      Cervical: No cervical adenopathy.   Skin:     General: Skin is warm and dry.      Findings: No rash.   Neurological:      General: No focal deficit present.      Mental Status: She is alert and oriented to person, place, and time.   Psychiatric:         Mood and Affect: Mood normal.         Behavior: Behavior normal.           CBC:  Recent Labs   Lab 05/29/24  0842 10/14/24  0829 05/05/25  0851   WBC 3.35 L 3.04 L  2.91 L 3.53 L   RBC 4.10 4.23  4.16 4.58   Hemoglobin 13.0 12.9  12.8  --    HGB  --   --  13.5   Hematocrit 38.9 39.9  39.1  --    HCT  --   --  43.5   Platelet Count  --   --  112 L   Platelets 116 L 101 L  93 L  --    MCV 95 94  94 95   MCH 31.7 H 30.5  30.8 29.5   MCHC 33.4 32.3  32.7 31.0 L     CMP:  Recent Labs   Lab 05/29/24  0842 10/14/24  0829 05/05/25  0851   Glucose 109  111 H 110  110 127 H   Calcium 9.5  9.4 8.9  9.1 9.2   Albumin 3.5  3.6 3.6  3.7 3.6   Protein Total  --   --  7.2   Total Protein 7.3  7.4 6.7  6.8  --    Sodium 136  136 139  140 138   Potassium 4.9  4.8 4.4  4.4 4.8   CO2 21 L  21 L 21 L  23 23   Chloride 105  103 110  111 H 108   BUN 11  11 14  15 18   Creatinine 0.9  0.8 0.8  0.8 0.8   Alkaline Phosphatase 65  67 86  86  --    ALP  --   --  83   ALT 19  18 29  30 26   AST 22  22 28  29 26   Total Bilirubin 1.2 H  1.2 H 1.0  1.0  --    Bilirubin Total  --   --  1.1 H     URINALYSIS:  Recent Labs   Lab 05/25/24  1154   Color, UA Yellow   Specific Gravity, UA 1.025   pH, UA 7.0   Protein, UA Negative   Nitrite, UA Negative   Leukocytes, UA Negative   Urobilinogen, UA Negative       LIPIDS:  Recent Labs   Lab 05/29/24  0842 10/14/24  0829 05/16/25  0754   TSH  --  1.920  --    HDL 52 73  --    HDL Cholesterol  --   --  71   Cholesterol Total  --   --  150   Cholesterol 176 175  --    Triglycerides 82 44  --    Triglyceride  --   --  66   LDL Cholesterol 107.6 93.2 65.8   HDL/Cholesterol Ratio 29.5 41.7 47.3   Non-HDL Cholesterol 124 102  --    Non HDL Cholesterol  --   --  79   Total Cholesterol/HDL Ratio 3.4 2.4  --    Cholesterol/HDL Ratio  --   --  2.1     TSH:  Recent Labs   Lab 10/14/24  0829   TSH 1.920       A1C:  Recent Labs   Lab 10/14/24  0829 05/16/25  0754   Hemoglobin A1C 6.5 H  --    Hemoglobin A1c  --  6.4 H       Imaging:  X-Ray Chest PA And Lateral  Narrative: EXAMINATION:  XR CHEST PA AND LATERAL    CLINICAL HISTORY:  Acute cough    FINDINGS:  Chest two views:    Heart size is normal.  Lungs are clear.  The bones are noncontributory.  Impression: No acute process seen.    Electronically signed by: Dao Guerrero MD  Date:    06/23/2025  Time:    14:21      Assessment:       1. Type 2 diabetes mellitus with chronic kidney disease, without long-term current use of insulin, unspecified CKD stage    2. Esophageal varices without bleeding, unspecified esophageal varices type    3. Dyslipidemia    4. Hypothyroidism, unspecified type    5. Pruritus    6. Urinary incontinence, unspecified type    7. Healthcare maintenance            Plan:       1. Type 2 diabetes mellitus with chronic kidney disease, without long-term current use of insulin, unspecified CKD stage  Overview:  Followed by endocrinologist outside [facility].  A1c apparently stable.  Has been on glipizide 5 mg twice a day without problems.  Previously tried Rybelsus 3 mg Daily and was able to  lose weight but was not tolerating side effects.    Assessment & Plan:  - Fasting glucose was 127, higher than the desired level of under 120 or ideally less than 110, indicating current regimen is insufficient.  - Discontinued  glipizide 5 mg and prescribed Farxiga 5 mg daily to be started after returning from trip, with potential increase to 10 mg if tolerated.  - Explained Farxiga eliminates excess sugar through urine, with potential side effects including increased urination and risk of UTIs.  - Advised on importance of hydration when taking Farxiga, monitoring urine color as an indicator of hydration status, and continuing to monitor blood sugar.  - Ordered metabolic panel and hemoglobin A1C to be completed before next appointment.  - Audrea to contact office if any issues arise with new medication after initiation.    Orders:  -     Discontinue: dapagliflozin propanediol (FARXIGA) 5 mg Tab tablet; Take 1 tablet (5 mg total) by mouth once daily.  Dispense: 30 tablet; Refill: 2  -     Diabetes Digital Medicine (DDMP) Enrollment Order  -     Hypertension Digital Medicine (HDMP) Enrollment Order  -     Discontinue: dapagliflozin propanediol (FARXIGA) 5 mg Tab tablet; Take 1 tablet (5 mg total) by mouth once daily.  Dispense: 90 tablet; Refill: 3  -     dapagliflozin propanediol (FARXIGA) 5 mg Tab tablet; Take 1 tablet (5 mg total) by mouth once daily.  Dispense: 30 tablet; Refill: 3  -     Hemoglobin A1C; Standing  -     Comprehensive Metabolic Panel; Standing    2. Esophageal varices without bleeding, unspecified esophageal varices type  -     carvediloL (COREG) 6.25 MG tablet; Take 1 tablet (6.25 mg total) by mouth 2 (two) times daily with meals.  Dispense: 180 tablet; Refill: 3    3. Dyslipidemia  Overview:  With history of diabetes mellitus type 2 her  goal LDL is 70mg/dl.    Assessment & Plan:  - Cholesterol level is less than 70, indicating good control.  - Current management is effective; continued atorvastatin at current dosage.    Orders:  -     atorvastatin (LIPITOR) 10 MG tablet; Take 1 tablet (10 mg total) by mouth once daily.  Dispense: 90 tablet; Refill: 3    4. Hypothyroidism, unspecified type  Overview:  Follows with  endocrinologist outside [facility].    On levothyroxine 25 mcg daily.    Assessment & Plan:  - Last TSH was within normal limits.   - Ultrasound from last year showed normal results with nodules too small to require intervention.  - Continued levothyroxine at current dosage.      5. Pruritus  Assessment & Plan:  - Ning reports itching on legs, feet, and hands, especially when settling down to sleep, with no rash present.  - Skin appears slightly dry, which may be contributing to the itching.  - Assessed environmental factors as potential causes, such as cleaning and laundry products.  - Recommend using products for sensitive skin, regular cream application, and increasing water intake to address potential dehydration.  - Confirmed skin dryness (dry skin) as a contributing factor to pruritus.  - Recommend daily application of moisturizing cream and increased water intake to improve skin hydration.    Orders:  -     Phosphorus; Future; Expected date: 08/08/2025    6. Urinary incontinence, unspecified type  Assessment & Plan:  - Ning reports urinary leakage when needing to use the bathroom.  - Provided detailed information on pelvic floor exercises for managing urinary incontinence, advising that exercises may take 3-6 months to show improvement.  - Instructed patient to perform these exercises regularly to strengthen the pelvic floor muscles.      7. Healthcare maintenance  Assessment & Plan:  - Yearly labs-  10/14//24, 5/16/25  - Colon cancer screening-  09/18/2023  - ACP-  completed 8/1/24  - Eye exam-  3/20/25  - Foot exam-  5/8/25  - DEXA- 8/7/24  - Vaccination-  due for RSV, COVID-19, shingles shot.               Health Maintenance Due   Topic Date Due    Shingles Vaccine (2 of 2) 05/20/2024        I spent a total of 40 minutes on the day of the visit.  This includes face to face time and non-face to face time preparing to see the patient (eg, review of tests), obtaining and/or reviewing separately obtained  history, documenting clinical information in the electronic or other health record, independently interpreting results and communicating results to the patient/family/caregiver, or care coordinator.     RETURN TO CLINIC IN: 3 MONTHS    FOR NEXT VISIT: DIABETES MONITORING, REVIEW LABS, and MEDICATION MONITORING       May Meade MD  [facility]  Disclaimer:  This note has been generated using voice-recognition software. There may be grammatical or spelling errors that have been missed during proof-reading      This note was generated with the assistance of ambient listening technology. Verbal consent was obtained by the patient and accompanying visitor(s) for the recording of patient appointment to facilitate this note. I attest to having reviewed and edited the generated note for accuracy, though some syntax or spelling errors may persist. Please contact the author of this note for any clarification.              [1]   Current Outpatient Medications   Medication Sig Dispense Refill    azelastine (ASTELIN) 137 mcg (0.1 %) nasal spray Use 1 spray in each nostril 2 (two) times daily. 30 mL 3    celecoxib (CELEBREX) 100 MG capsule take 1 Capsule by mouth  twice daily as needed 180 capsule 1    cetirizine (ZYRTEC) 10 MG tablet Take 1 tablet (10 mg total) by mouth once daily. 14 tablet 0    clotrimazole (LOTRIMIN) 1 % cream Apply topically 2 (two) times daily. 30 g 5    ergocalciferol (ERGOCALCIFEROL) 50,000 unit Cap take 1 capsule by mouth every 7 days 4 capsule 12    ergocalciferol (ERGOCALCIFEROL) 50,000 unit Cap TAKE 1 CAPSULE BY MOUTH EVERY WEEK 12 capsule 3    ferrous sulfate 324 mg (65 mg iron) TbEC Take 1 tablet (324 mg total) by mouth once daily. 100 tablet 3    hydrALAZINE (APRESOLINE) 25 MG tablet Take 1 tablet (25 mg total) by mouth 3 (three) times daily. 270 tablet 6    levothyroxine (SYNTHROID) 25 MCG tablet Take 1 tablet (25 mcg total) by mouth before breakfast. 30 tablet 5    pantoprazole  (PROTONIX) 40 MG tablet Take 1 Tablet(s) Oral every day 90 tablet 0    atorvastatin (LIPITOR) 10 MG tablet Take 1 tablet (10 mg total) by mouth once daily. 90 tablet 3    blood sugar diagnostic (CONTOUR TEST STRIPS) Strp Check blood sugar twice weekly plus as directed by MD (Patient not taking: Reported on 5/8/2025) 100 each 6    carvediloL (COREG) 6.25 MG tablet Take 1 tablet (6.25 mg total) by mouth 2 (two) times daily with meals. 180 tablet 3    dapagliflozin propanediol (FARXIGA) 5 mg Tab tablet Take 1 tablet (5 mg total) by mouth once daily. 30 tablet 3    levocetirizine (XYZAL) 5 MG tablet Take 1 tablet (5 mg total) by mouth every evening. (Patient not taking: Reported on 5/8/2025) 30 tablet 11     Current Facility-Administered Medications   Medication Dose Route Frequency Provider Last Rate Last Admin    sodium chloride 0.9% flush 10 mL  10 mL Intravenous PRN Tami Zarate MD

## 2025-08-09 PROBLEM — R32 URINARY INCONTINENCE: Status: ACTIVE | Noted: 2025-08-09

## 2025-08-09 PROBLEM — L29.9 PRURITUS: Status: ACTIVE | Noted: 2025-08-09

## 2025-08-09 NOTE — ASSESSMENT & PLAN NOTE
- Fasting glucose was 127, higher than the desired level of under 120 or ideally less than 110, indicating current regimen is insufficient.  - Discontinued glipizide 5 mg and prescribed Farxiga 5 mg daily to be started after returning from trip, with potential increase to 10 mg if tolerated.  - Explained Farxiga eliminates excess sugar through urine, with potential side effects including increased urination and risk of UTIs.  - Advised on importance of hydration when taking Farxiga, monitoring urine color as an indicator of hydration status, and continuing to monitor blood sugar.  - Ordered metabolic panel and hemoglobin A1C to be completed before next appointment.  - Audrea to contact office if any issues arise with new medication after initiation.

## 2025-08-09 NOTE — ASSESSMENT & PLAN NOTE
- Cholesterol level is less than 70, indicating good control.  - Current management is effective; continued atorvastatin at current dosage.

## 2025-08-09 NOTE — ASSESSMENT & PLAN NOTE
- Last TSH was within normal limits.   - Ultrasound from last year showed normal results with nodules too small to require intervention.  - Continued levothyroxine at current dosage.

## 2025-08-09 NOTE — ASSESSMENT & PLAN NOTE
- Ning reports urinary leakage when needing to use the bathroom.  - Provided detailed information on pelvic floor exercises for managing urinary incontinence, advising that exercises may take 3-6 months to show improvement.  - Instructed patient to perform these exercises regularly to strengthen the pelvic floor muscles.

## 2025-08-09 NOTE — ASSESSMENT & PLAN NOTE
- Yearly labs-  10/14//24, 5/16/25  - Colon cancer screening-  09/18/2023  - ACP-  completed 8/1/24  - Eye exam-  3/20/25  - Foot exam-  5/8/25  - DEXA- 8/7/24  - Vaccination-  due for RSV, COVID-19, shingles shot.

## 2025-08-09 NOTE — ASSESSMENT & PLAN NOTE
- Ning reports itching on legs, feet, and hands, especially when settling down to sleep, with no rash present.  - Skin appears slightly dry, which may be contributing to the itching.  - Assessed environmental factors as potential causes, such as cleaning and laundry products.  - Recommend using products for sensitive skin, regular cream application, and increasing water intake to address potential dehydration.  - Confirmed skin dryness (dry skin) as a contributing factor to pruritus.  - Recommend daily application of moisturizing cream and increased water intake to improve skin hydration.

## 2025-08-13 RX ORDER — FERROUS SULFATE 324(65)MG
325 TABLET, DELAYED RELEASE (ENTERIC COATED) ORAL DAILY
Qty: 100 TABLET | Refills: 3 | OUTPATIENT
Start: 2025-08-13

## (undated) DEVICE — GLOVE BIOGEL SKINSENSE PI 7.5

## (undated) DEVICE — Device

## (undated) DEVICE — SOL BETADINE 5%

## (undated) DEVICE — SYR SLIP TIP 1CC

## (undated) DEVICE — SOL PVP-I SCRUB 7.5% 4OZ

## (undated) DEVICE — GLASSES EYE PROTECTIVE